# Patient Record
Sex: FEMALE | Race: WHITE | NOT HISPANIC OR LATINO | Employment: FULL TIME | URBAN - METROPOLITAN AREA
[De-identification: names, ages, dates, MRNs, and addresses within clinical notes are randomized per-mention and may not be internally consistent; named-entity substitution may affect disease eponyms.]

---

## 2017-01-06 ENCOUNTER — OFFICE VISIT (OUTPATIENT)
Dept: FAMILY MEDICINE | Facility: CLINIC | Age: 47
End: 2017-01-06
Payer: COMMERCIAL

## 2017-01-06 ENCOUNTER — RADIANT APPOINTMENT (OUTPATIENT)
Dept: GENERAL RADIOLOGY | Facility: CLINIC | Age: 47
End: 2017-01-06
Attending: FAMILY MEDICINE
Payer: COMMERCIAL

## 2017-01-06 VITALS
HEIGHT: 68 IN | DIASTOLIC BLOOD PRESSURE: 76 MMHG | SYSTOLIC BLOOD PRESSURE: 124 MMHG | WEIGHT: 231 LBS | OXYGEN SATURATION: 96 % | TEMPERATURE: 98 F | BODY MASS INDEX: 35.01 KG/M2 | HEART RATE: 85 BPM

## 2017-01-06 DIAGNOSIS — R05.9 COUGH: ICD-10-CM

## 2017-01-06 DIAGNOSIS — R05.9 COUGH: Primary | ICD-10-CM

## 2017-01-06 DIAGNOSIS — R09.89 CHEST CONGESTION: ICD-10-CM

## 2017-01-06 DIAGNOSIS — R09.81 NASAL CONGESTION: ICD-10-CM

## 2017-01-06 DIAGNOSIS — R06.02 SOB (SHORTNESS OF BREATH): ICD-10-CM

## 2017-01-06 PROCEDURE — 71020 XR CHEST 2 VW: CPT

## 2017-01-06 PROCEDURE — 99214 OFFICE O/P EST MOD 30 MIN: CPT | Performed by: FAMILY MEDICINE

## 2017-01-06 RX ORDER — PREDNISONE 20 MG/1
20 TABLET ORAL 2 TIMES DAILY
Qty: 10 TABLET | Refills: 0 | Status: SHIPPED | OUTPATIENT
Start: 2017-01-06 | End: 2017-10-24

## 2017-01-06 NOTE — PATIENT INSTRUCTIONS
Use inhaler, nasal spray  Prednisone and follow up if not resolving or new symptoms arise    Benjamin Phelps D.O.    [facility]   Discharged by : Carley SIMMONS MA    If you have any questions regarding your visit please contact your care team:     [facility] Clinic Hours Telephone Number   Dr. Brook Calderon, [facility]   7am-7pm Monday - Thursday   7am-5pm Fridays  [phone]   (Appointment scheduling available 24/7)   RN Line   [phone] option 2       For a Price Quote for your services, please call our Consumer Price Line at [phone].     What options do I have for visits at the clinic other than the traditional office visit?     To expand how we care for you, many of our providers are utilizing electronic visits (e-visits) and telephone visits, when medically appropriate, for interactions with their patients rather than a visit in the clinic. We also offer nurse visits for many medical concerns. Just like any other service, we will bill your insurance company for this type of visit based on time spent on the phone with your provider. Not all insurance companies cover these visits. Please check with your medical insurance if this type of visit is covered. You will be responsible for any charges that are not paid by your insurance.   E-visits via markeduphart: generally incur a $35.00 fee.     Telephone visits:   Time spent on the phone: *charged based on time that is spent on the phone in increments of 10 minutes. Estimated cost:   5-10 mins $30.00   11-20 mins. $59.00   21-30 mins. $85.00     Use Microtunet (secure email communication and access to your chart) to send your primary care provider a message or make an appointment. Ask someone on your Team how to sign up for Tengah.     As always, Thank you for trusting us with your health care needs!    WHERE TO GO FOR CARE?    Clinic    Make an appointment if you:       Are sick (cold, cough, flu,  sore throat, earache or in pain).       Have a small injury (sprain, small cut, burn or broken bone).       Need a physical exam, Pap smear, vaccine or prescription refill.       Have questions about your health or medicines.    To reach us:      Call [phone] ([phone]). Open 24 hours every day. (For counseling services, call [phone].)    Log into [employer] at [employer]. (Visit [email or web address] to create an account.) Hospital emergency room    An emergency is a serious or life- threatening problem that must be treated right away.    Call 911 or get to the hospital if you have:      Very bad or sudden:            - Chest pain or pressure         - Bleeding         - Head or belly pain         - Dizziness or trouble seeing, walking or                          Speaking      Problems breathing      Blood in your vomit or you are coughing up blood      A major injury (knocked out, loss of a finger or limb, rape, broken bone protruding from skin)    A mental health crisis. (Or call the Mental Health Crisis line at 1-587.585.5013 or Suicide Prevention Hotline at 1-825.870.8077.)    Open 24 hours every day. You don't need an appointment.     Urgent care    Visit urgent care for sickness or small injuries when the clinic is closed. You don't need an appointment. To check hours or find an urgent care near you, visit [email or web address]. Online care    Get online care from [email or web address] for more than 70 common problems, like colds, allergies and infections. Open 24 hours every day at: [email or web address]   Need help deciding?    For advice about where to be seen, you may call your clinic and ask to speak with a nurse. We're here for you 24 hours every day.         If you are deaf or hard of hearing, please let us know. We provide many free services including sign language interpreters, oral interpreters, TTYs, telephone amplifiers, note takers and written materials.

## 2017-01-06 NOTE — NURSING NOTE
"Chief Complaint   Patient presents with     Cold Symptoms       Initial /76 mmHg  Pulse 85  Temp(Src) 98  F (36.7  C) (Oral)  Ht 5' 7.75\" (1.721 m)  Wt 231 lb (104.781 kg)  BMI 35.38 kg/m2  SpO2 96% Estimated body mass index is 35.38 kg/(m^2) as calculated from the following:    Height as of this encounter: 5' 7.75\" (1.721 m).    Weight as of this encounter: 231 lb (104.781 kg).  BP completed using cuff size: cee Johnson MA     "

## 2017-01-06 NOTE — PROGRESS NOTES
SUBJECTIVE:                                                    Nazia Jasso is a 46 year old female who presents to clinic today for the following health issues:    ENT Symptoms             Symptoms: cc Present Absent Comment   Fever/Chills   x    Fatigue  x     Muscle Aches   x    Eye Irritation   x    Sneezing  x     Nasal Dom/Drg   x    Sinus Pressure/Pain  x     Loss of smell   x    Dental pain   x    Sore Throat  x     Swollen Glands   x    Ear Pain/Fullness   x    Cough  x     Wheeze  x     Chest Pain  x  tightness   Shortness of breath   x    Rash   x    Other   x      Symptom duration:  several weeks   Symptom severity:  moderate   Treatments tried:  mucinex, antibiotics   Contacts:  works in a school     11/22/16 treated for bronchitis, zpak , got better , 2 days later felt like symptoms restarted  More cough, no congestion, no fever, some fatigue, few days ago HA, mild nasal drainage  No sob, no tob use, no history of asthma, no real history of recurrent infections few times had has pneumonia  She denies fever      Problem list and histories reviewed & adjusted, as indicated.  Additional history: as documented    Patient Active Problem List   Diagnosis     CARDIOVASCULAR SCREENING; LDL GOAL LESS THAN 160     Mild major depression (H)     Family history of thyroid problem     Encounter for routine gynecological examination     ASCUS of cervix with negative high risk HPV     History reviewed. No pertinent past surgical history.    Social History   Substance Use Topics     Smoking status: Never Smoker      Smokeless tobacco: Never Used     Alcohol Use: Yes      Comment: 1-2 drink per month     Family History   Problem Relation Age of Onset     C.A.D. Paternal Grandfather      CEREBROVASCULAR DISEASE Paternal Grandfather      C.A.D. Maternal Grandfather      DIABETES No family hx of      Breast Cancer No family hx of      Cancer - colorectal No family hx of      Hypertension Mother       "Hypertension Maternal Grandmother      Prostate Cancer Father            ROS:  Constitutional, HEENT, cardiovascular, pulmonary, GI, , musculoskeletal, neuro, skin, endocrine and psych systems are negative, except as otherwise noted.    OBJECTIVE:                                                    /76 mmHg  Pulse 85  Temp(Src) 98  F (36.7  C) (Oral)  Ht 5' 7.75\" (1.721 m)  Wt 231 lb (104.781 kg)  BMI 35.38 kg/m2  SpO2 96%  Body mass index is 35.38 kg/(m^2).  GENERAL: healthy, alert and no distress  EYES: Eyes grossly normal to inspection, PERRL and conjunctivae and sclerae normal  HENT: ear canals and TM's normal, nose and mouth without ulcers or lesions  NECK: no adenopathy, no asymmetry, masses, or scars and thyroid normal to palpation  RESP: lungs clear to auscultation - no rales, rhonchi or wheezes  CV: regular rate and rhythm, normal S1 S2, no S3 or S4, no murmur, click or rub, no peripheral edema and peripheral pulses strong  ABDOMEN: soft, nontender, no hepatosplenomegaly, no masses and bowel sounds normal  MS: no gross musculoskeletal defects noted, no edema    Diagnostic Test Results:  Chest x-ray-negative, read by me     ASSESSMENT/PLAN:                                                        ICD-10-CM    1. Cough R05 XR Chest 2 Views     predniSONE (DELTASONE) 20 MG tablet   2. Nasal congestion R09.81    3. Chest congestion R09.89    4. SOB (shortness of breath) R06.02 predniSONE (DELTASONE) 20 MG tablet       Patient with persisting cough but got better for a while and sounds like another viral illness  shortness of breath/cough-advised inhalers, and prednisone, follow up if worsening  Consider antibiotics if not resolving  Reviewed plan with patient    See Patient Instructions    Benjamin Phelps DO  [facility]    "

## 2017-01-06 NOTE — MR AVS SNAPSHOT
"              After Visit Summary   1/6/2017    Nazia Jasso    MRN: [id number]           Patient Information     Date Of Birth          1970        Visit Information        Provider Department      1/6/2017 1:20 PM Benjamin Phelps DO [facility]        Today's Diagnoses     Cough    -  1     Nasal congestion         Chest congestion         SOB (shortness of breath)           Care Instructions    Use inhaler, nasal spray  Prednisone and follow up if not resolving or new symptoms arise    Benjamin Phelps D.O.          Follow-ups after your visit        Who to contact     If you have questions or need follow up information about today's clinic visit or your schedule please contact [facility] directly at [phone].  Normal or non-critical lab and imaging results will be communicated to you by Mirificehart, letter or phone within 4 business days after the clinic has received the results. If you do not hear from us within 7 days, please contact the clinic through Mirificehart or phone. If you have a critical or abnormal lab result, we will notify you by phone as soon as possible.  Submit refill requests through [employer] or call your pharmacy and they will forward the refill request to us. Please allow 3 business days for your refill to be completed.          Additional Information About Your Visit        MyChart Information     [employer] lets you send messages to your doctor, view your test results, renew your prescriptions, schedule appointments and more. To sign up, go to [email or web address] . Click on \"Log in\" on the left side of the screen, which will take you to the Welcome page. Then click on \"Sign up Now\" on the right side of the page.     You will be asked to enter the access code listed below, as well as some personal information. Please follow the directions to create your username and password.     Your access code is: [email or web address]  Expires: 2/20/2017 " "10:13 AM     Your access code will  in 90 days. If you need help or a new code, please call your [facility] or [phone].        Care EveryWhere ID     This is your Care EveryWhere ID. This could be used by other organizations to access your [facility] medical records  [id number]        Your Vitals Were     Pulse Temperature Height BMI (Body Mass Index) Pulse Oximetry       85 98  F (36.7  C) (Oral) 5' 7.75\" (1.721 m) 35.38 kg/m2 96%        Blood Pressure from Last 3 Encounters:   17 124/76   16 105/62   16 118/60    Weight from Last 3 Encounters:   17 231 lb (104.781 kg)   16 230 lb (104.327 kg)   16 230 lb (104.327 kg)                 Today's Medication Changes          These changes are accurate as of: 17  2:22 PM.  If you have any questions, ask your nurse or doctor.               Start taking these medicines.        Dose/Directions    predniSONE 20 MG tablet   Commonly known as:  DELTASONE   Used for:  Cough, SOB (shortness of breath)   Started by:  Benjamin Phelps DO        Dose:  20 mg   Take 1 tablet (20 mg) by mouth 2 times daily   Quantity:  10 tablet   Refills:  0         Stop taking these medicines if you haven't already. Please contact your care team if you have questions.     triamcinolone 0.1 % cream   Commonly known as:  KENALOG   Stopped by:  Benjamin Phelps DO                Where to get your medicines      These medications were sent to [facility] [id number] [facility] - [place], [place] - [address]  [address] [address]     Phone:  [phone]    - predniSONE 20 MG tablet             Primary Care Provider Office Phone # Fax #    Sarah Gregorio [phone]       [facility] [place] [address] [place] [id number]        Thank you!     Thank you for choosing [facility]  for your care. Our goal is always to provide you with excellent care. " Hearing back from our patients is one way we can continue to improve our services. Please take a few minutes to complete the written survey that you may receive in the mail after your visit with us. Thank you!             Your Updated Medication List - Protect others around you: Learn how to safely use, store and throw away your medicines at www.disposemymeds.org.          This list is accurate as of: 1/6/17  2:22 PM.  Always use your most recent med list.                   Brand Name Dispense Instructions for use    citalopram 20 MG tablet    celeXA    90 tablet    Take 1 tablet (20 mg) by mouth daily       ketoconazole 2 % cream    NIZORAL    30 g    Place a small amount in ear canal twice daily for 4 weeks.       predniSONE 20 MG tablet    DELTASONE    10 tablet    Take 1 tablet (20 mg) by mouth 2 times daily

## 2017-06-27 ENCOUNTER — RADIANT APPOINTMENT (OUTPATIENT)
Dept: MAMMOGRAPHY | Facility: CLINIC | Age: 47
End: 2017-06-27
Attending: FAMILY MEDICINE
Payer: COMMERCIAL

## 2017-06-27 DIAGNOSIS — Z12.31 VISIT FOR SCREENING MAMMOGRAM: ICD-10-CM

## 2017-06-27 PROCEDURE — G0202 SCR MAMMO BI INCL CAD: HCPCS | Mod: TC

## 2017-09-06 DIAGNOSIS — F33.0 MAJOR DEPRESSIVE DISORDER, RECURRENT EPISODE, MILD (H): ICD-10-CM

## 2017-09-06 NOTE — TELEPHONE ENCOUNTER
citalopram (CELEXA) 20 MG tablet   20 mg, DAILY 3 ordered  Edit     Summary: Take 1 tablet (20 mg) by mouth daily, Disp-90 tablet, R-3, E-Prescribe   Dose, Route, Frequency: 20 mg, Oral, DAILY  Start: 6/17/2016  Ord/Sold: 6/17/2016 (O)  Report  Taking:   Long-term:   Pharmacy: [place] [id number] [place] [address] Dose History       Patient Sig: Take 1 tablet (20 mg) by mouth daily       Ordered on: 6/17/2016       Authorized by: GUZMAN DAWSON       Dispense: 90 tablet          Last Office Visit with [facility] primary care provider:  1-6-2017        Last PHQ-9 score on record=   PHQ-9 SCORE 11/22/2016   Total Score -   Total Score 0

## 2017-09-07 RX ORDER — CITALOPRAM HYDROBROMIDE 20 MG/1
20 TABLET ORAL DAILY
Qty: 30 TABLET | Refills: 0 | Status: SHIPPED | OUTPATIENT
Start: 2017-09-07 | End: 2017-10-24

## 2017-10-24 ENCOUNTER — OFFICE VISIT (OUTPATIENT)
Dept: FAMILY MEDICINE | Facility: CLINIC | Age: 47
End: 2017-10-24
Payer: COMMERCIAL

## 2017-10-24 VITALS
HEIGHT: 68 IN | WEIGHT: 234 LBS | TEMPERATURE: 98.1 F | SYSTOLIC BLOOD PRESSURE: 122 MMHG | DIASTOLIC BLOOD PRESSURE: 75 MMHG | BODY MASS INDEX: 35.46 KG/M2 | HEART RATE: 76 BPM

## 2017-10-24 DIAGNOSIS — Z23 NEED FOR PROPHYLACTIC VACCINATION AND INOCULATION AGAINST INFLUENZA: ICD-10-CM

## 2017-10-24 DIAGNOSIS — Z00.00 ROUTINE GENERAL MEDICAL EXAMINATION AT A HEALTH CARE FACILITY: Primary | ICD-10-CM

## 2017-10-24 DIAGNOSIS — F33.0 MAJOR DEPRESSIVE DISORDER, RECURRENT EPISODE, MILD (H): ICD-10-CM

## 2017-10-24 PROCEDURE — 99396 PREV VISIT EST AGE 40-64: CPT | Mod: 25 | Performed by: FAMILY MEDICINE

## 2017-10-24 PROCEDURE — 90686 IIV4 VACC NO PRSV 0.5 ML IM: CPT | Performed by: FAMILY MEDICINE

## 2017-10-24 PROCEDURE — 90471 IMMUNIZATION ADMIN: CPT | Performed by: FAMILY MEDICINE

## 2017-10-24 RX ORDER — CITALOPRAM HYDROBROMIDE 20 MG/1
20 TABLET ORAL DAILY
Qty: 90 TABLET | Refills: 3 | Status: SHIPPED | OUTPATIENT
Start: 2017-10-24 | End: 2018-11-01

## 2017-10-24 ASSESSMENT — PATIENT HEALTH QUESTIONNAIRE - PHQ9: SUM OF ALL RESPONSES TO PHQ QUESTIONS 1-9: 1

## 2017-10-24 NOTE — NURSING NOTE
"Chief Complaint   Patient presents with     Physical       Initial /75  Pulse 76  Temp 98.1  F (36.7  C) (Oral)  Ht 5' 8\" (1.727 m)  Wt 234 lb (106.1 kg)  LMP 09/25/2017 (Approximate)  Breastfeeding? No  BMI 35.58 kg/m2 Estimated body mass index is 35.58 kg/(m^2) as calculated from the following:    Height as of this encounter: 5' 8\" (1.727 m).    Weight as of this encounter: 234 lb (106.1 kg).  Medication Reconciliation: complete    "

## 2017-10-24 NOTE — PROGRESS NOTES
SUBJECTIVE:   CC: Nazia Jasso is an 47 year old woman who presents for preventive health visit.     Physical   Annual:     Getting at least 3 servings of Calcium per day::  NO    Bi-annual eye exam::  NO    Dental care twice a year::  Yes    Sleep apnea or symptoms of sleep apnea::  Excessive snoring    Diet::  Regular (no restrictions)    Frequency of exercise::  1 day/week    Duration of exercise::  15-30 minutes    Taking medications regularly::  Yes    Medication side effects::  None    Additional concerns today::  No    Weight Management:  She states she has gained weight recently. She states she has not been exercising very much if at all lately. She states she sometimes walks outside, but not often. She works just about every day and has two young kids. She states they eat out a lot and she knows that this isn't good for her. She says she just has a very busy lifestyle.     Cold Symptoms:  She is concerned about her immune system because she feels as if she gets sick a lot. She states she is a  so she is around a lot of germs, but she feels like when she gets these cold she cannot tough it out and go to work. She says that she has to stay home in bed when this happens. She states she gets this cold about 2 times per year. She says when the cold begins she tries to take vitamins to try to avoid getting it. She states that the cold usually starts in her sinuses and then drains and lingers in her chest with a cough. Usually her colds last about 1-2 weeks. She states she has never been diagnosed with asthma.         Depression Followup    Status since last visit: Stable     See PHQ-9 for current symptoms.  Other associated symptoms: None    Complicating factors:   Significant life event:  No   Current substance abuse:  None  Anxiety or Panic symptoms:  No  She states that her mood is good and very stable. She is happy with her Celexa.   PHQ-9 Score and MyChart F/U Questions  2/22/2016 11/22/2016   Total Score 4 0   Q9: Suicide Ideation Not at all Not at all       PHQ-9  English  PHQ-9   Any Language  Suicide Assessment Five-step Evaluation and Treatment (SAFE-T)        Today's PHQ-2 Score: PHQ-2 ( 1999 Pfizer) 10/24/2017   Q1: Little interest or pleasure in doing things 0   Q2: Feeling down, depressed or hopeless 0   PHQ-2 Score 0   Q1: Little interest or pleasure in doing things Not at all   Q2: Feeling down, depressed or hopeless Not at all   PHQ-2 Score 0       Abuse: Current or Past(Physical, Sexual or Emotional)- No  Do you feel safe in your environment - Yes    Social History   Substance Use Topics     Smoking status: Never Smoker     Smokeless tobacco: Never Used     Alcohol use Yes      Comment: 1-2 drink per month     The patient does not drink >3 drinks per day nor >7 drinks per week.    Reviewed orders with patient.  Reviewed health maintenance and updated orders accordingly - Yes  Patient Active Problem List   Diagnosis     CARDIOVASCULAR SCREENING; LDL GOAL LESS THAN 160     Mild major depression (H)     Family history of thyroid problem     Encounter for routine gynecological examination     ASCUS of cervix with negative high risk HPV     History reviewed. No pertinent surgical history.    Social History   Substance Use Topics     Smoking status: Never Smoker     Smokeless tobacco: Never Used     Alcohol use Yes      Comment: 1-2 drink per month     Family History   Problem Relation Age of Onset     C.A.D. Paternal Grandfather      CEREBROVASCULAR DISEASE Paternal Grandfather      C.A.D. Maternal Grandfather      Hypertension Mother      Hypertension Maternal Grandmother      Prostate Cancer Father      DIABETES No family hx of      Breast Cancer No family hx of      Cancer - colorectal No family hx of          Current Outpatient Prescriptions   Medication Sig Dispense Refill     citalopram (CELEXA) 20 MG tablet Take 1 tablet (20 mg) by mouth daily Due for a visit for  "refills! 30 tablet 0     Allergies   Allergen Reactions     Septra Ds [Sulfamethoxazole W-Trimethoprim] Hives     Happened at end of course from 12/01/2011-12/     Erythromycin      hives     Penicillins      hives         Patient under age 50, mutual decision reflected in health maintenance.        Pertinent mammograms are reviewed under the imaging tab.  History of abnormal Pap smear: NO - age 30- 65 PAP every 3 years recommended    Reviewed and updated as needed this visit by clinical staffTobacco  Allergies  Med Hx  Surg Hx  Fam Hx  Soc Hx        Reviewed and updated as needed this visit by Provider  Allergies  Meds  Problems            Review of Systems  C: NEGATIVE for fever, chills, change in weight  I: NEGATIVE for worrisome rashes, moles or lesions  E: NEGATIVE for vision changes or irritation  ENT: NEGATIVE for ear, mouth and throat problems  R: NEGATIVE for significant cough or SOB  B: NEGATIVE for masses, tenderness or discharge  CV: NEGATIVE for chest pain, palpitations or peripheral edema  GI: NEGATIVE for nausea, abdominal pain, heartburn, or change in bowel habits  : NEGATIVE for unusual urinary or vaginal symptoms. Periods are regular.  M: NEGATIVE for significant arthralgias or myalgia  N: NEGATIVE for weakness, dizziness or paresthesias  P: NEGATIVE for changes in mood or affect     This document serves as a record of the services and decisions personally performed by GUZMAN DAWSON. It was created on his/her behalf by Aliya Jones, a trained medical scribe. The creation of this document is based on the provider's statements to the medical scribe. Aliya Jones, October 24, 2017 11:37 AM    OBJECTIVE:   /75  Pulse 76  Temp 98.1  F (36.7  C) (Oral)  Ht 5' 8\" (1.727 m)  Wt 234 lb (106.1 kg)  LMP 09/25/2017 (Approximate)  Breastfeeding? No  BMI 35.58 kg/m2  Physical Exam  GENERAL: healthy, alert and no distress  EYES: Eyes grossly normal to inspection, PERRL and " "conjunctivae and sclerae normal  HENT: ear canals and TM's normal, nose and mouth without ulcers or lesions  NECK: no adenopathy, no asymmetry, masses, or scars and thyroid normal to palpation  RESP: lungs clear to auscultation - no rales, rhonchi or wheezes  BREAST: normal without masses, tenderness or nipple discharge and no palpable axillary masses or adenopathy  CV: regular rate and rhythm, normal S1 S2, no S3 or S4, no murmur, click or rub, no peripheral edema and peripheral pulses strong  ABDOMEN: soft, nontender, no hepatosplenomegaly, no masses and bowel sounds normal  MS: no gross musculoskeletal defects noted, no edema  SKIN: no suspicious lesions or rashes  NEURO: Normal strength and tone, mentation intact and speech normal  PSYCH: mentation appears normal, affect normal/bright    ASSESSMENT/PLAN:   1. Routine general medical examination at a health care facility  Healthy.   Has reportedly been diagnosed with the MTHFR mutation.  (She was investigated because her son has the mutation).  She did meet with genetics and they did not advise any further intervention.    2. Major depressive disorder, recurrent episode, mild (H)  Patient states her mood is very stable and well controlled on Celexa. Continue current medications.  - citalopram (CELEXA) 20 MG tablet; Take 1 tablet (20 mg) by mouth daily  Dispense: 90 tablet; Refill: 3    COUNSELING:  Reviewed preventive health counseling, as reflected in patient instructions     reports that she has never smoked. She has never used smokeless tobacco.    Estimated body mass index is 35.58 kg/(m^2) as calculated from the following:    Height as of this encounter: 5' 8\" (1.727 m).    Weight as of this encounter: 234 lb (106.1 kg).   Weight management plan: Discussed healthy diet and exercise guidelines and patient will follow up in 12 months in clinic to re-evaluate.    Counseling Resources:  ATP IV Guidelines  Pooled Cohorts Equation Calculator  Breast Cancer Risk " Calculator  FRAX Risk Assessment  ICSI Preventive Guidelines  Dietary Guidelines for Americans, 2010  USDA's MyPlate  ASA Prophylaxis  Lung CA Screening    Sarah Gregorio MD  [facility]  Answers for HPI/ROS submitted by the patient on 10/24/2017   PHQ-2 Score: 0    The information in this document, created by the medical scribe Aliay Jones for me, accurately reflects the services I personally performed and the decisions made by me. I have reviewed and approved this document for accuracy prior to leaving the patient care area.

## 2017-10-24 NOTE — PATIENT INSTRUCTIONS
We will plan to recheck cholesterol and pap in 2019.  But let's touch base about your mood yearly.    Please send me the information you have on the MTHFR mutation and your diagnosis.    Preventive Health Recommendations  Female Ages 40 to 49    Yearly exam:     See your health care provider every year in order to  1. Review health changes.   2. Discuss preventive care.    3. Review your medicines if your doctor prescribed any.      Get a Pap test every three years (unless you have an abnormal result and your provider advises testing more often).      If you get Pap tests with HPV test, you only need to test every 5 years, unless you have an abnormal result. You do not need a Pap test if your uterus was removed (hysterectomy) and you have not had cancer.      You should be tested each year for STDs (sexually transmitted diseases), if you're at risk.       Ask your doctor if you should have a mammogram.      Have a colonoscopy (test for colon cancer) if someone in your family has had colon cancer or polyps before age 50.       Have a cholesterol test every 5 years.       Have a diabetes test (fasting glucose) after age 45. If you are at risk for diabetes, you should have this test every 3 years.    Shots: Get a flu shot each year. Get a tetanus shot every 10 years.     Nutrition:     Eat at least 5 servings of fruits and vegetables each day.    Eat whole-grain bread, whole-wheat pasta and brown rice instead of white grains and rice.    Talk to your provider about Calcium and Vitamin D.     Lifestyle    Exercise at least 150 minutes a week (an average of 30 minutes a day, 5 days a week). This will help you control your weight and prevent disease.    Limit alcohol to one drink per day.    No smoking.     Wear sunscreen to prevent skin cancer.    See your dentist every six months for an exam and cleaning.    [facility]   Discharged by : Carley SIMMONS MA    If you have any questions regarding your visit  please contact your care team:     [facility] Clinic Hours Telephone Number   Dr. Brook Suh   7am-7pm Monday - Thursday   7am-5pm Fridays  [phone]   (Appointment scheduling available 24/7)   RN Line   [phone] option 2       For a Price Quote for your services, please call our Consumer Price Line at [phone].     What options do I have for visits at the clinic other than the traditional office visit?     To expand how we care for you, many of our providers are utilizing electronic visits (e-visits) and telephone visits, when medically appropriate, for interactions with their patients rather than a visit in the clinic. We also offer nurse visits for many medical concerns. Just like any other service, we will bill your insurance company for this type of visit based on time spent on the phone with your provider. Not all insurance companies cover these visits. Please check with your medical insurance if this type of visit is covered. You will be responsible for any charges that are not paid by your insurance.   E-visits via The Matlet Grouphart: generally incur a $35.00 fee.     Telephone visits:   Time spent on the phone: *charged based on time that is spent on the phone in increments of 10 minutes. Estimated cost:   5-10 mins $30.00   11-20 mins. $59.00   21-30 mins. $85.00     Use The Matlet Grouphart (secure email communication and access to your chart) to send your primary care provider a message or make an appointment. Ask someone on your Team how to sign up for Teamlyt.     As always, Thank you for trusting us with your health care needs!      [facility]:    Scheduling Appointments  [facility] [place] [place]  Call: [phone]    [facility] [place]  Call: [phone]    [facility]  Call: [phone]    For Gastroenterology referrals   [facility]   Clinics and  Surgery Center, 4th Floor   [address] [place] [address]   Appointments: [phone]    WHERE TO GO FOR CARE?  Clinic    Make an appointment if you:       Are sick (cold, cough, flu, sore throat, earache or in pain).       Have a small injury (sprain, small cut, burn or broken bone).       Need a physical exam, Pap smear, vaccine or prescription refill.       Have questions about your health or medicines.    To reach us:      Call [phone] ([phone]). Open 24 hours every day. (For counseling services, call [phone].)    Log into Artaic at Rising. (Visit Panna to create an account.) Hospital emergency room    An emergency is a serious or life- threatening problem that must be treated right away.    Call 890 or get to the hospital if you have:      Very bad or sudden:            - Chest pain or pressure         - Bleeding         - Head or belly pain         - Dizziness or trouble seeing, walking or                          Speaking      Problems breathing      Blood in your vomit or you are coughing up blood      A major injury (knocked out, loss of a finger or limb, rape, broken bone protruding from skin)    A mental health crisis. (Or call the Mental Health Crisis line at 1-191.342.7207 or Suicide Prevention Hotline at 1-859.245.4683.)    Open 24 hours every day. You don't need an appointment.     Urgent care    Visit urgent care for sickness or small injuries when the clinic is closed. You don't need an appointment. To check hours or find an urgent care near you, visit [email or web address]. Online care    Get online care from [facility] for more than 70 common problems, like colds, allergies and infections. Open 24 hours every day at:   [email or web address]   Need help deciding?    For advice about where to be seen, you may call your clinic and ask to speak with a nurse. We're here for you 24 hours every day.         If you are deaf or hard of hearing, please let us  know. We provide many free services including sign language interpreters, oral interpreters, TTYs, telephone amplifiers, note takers and written materials.

## 2017-10-24 NOTE — MR AVS SNAPSHOT
After Visit Summary   10/24/2017    Nazia Jasso    MRN: [id number]           Patient Information     Date Of Birth          1970        Visit Information        Provider Department      10/24/2017 11:20 AM Sarah Gregorio MD [facility]        Today's Diagnoses     Routine general medical examination at a health care facility    -  1    Major depressive disorder, recurrent episode, mild (H)          Care Instructions    We will plan to recheck cholesterol and pap in 2019.  But let's touch base about your mood yearly.    Please send me the information you have on the MTHFR mutation and your diagnosis.    Preventive Health Recommendations  Female Ages 40 to 49    Yearly exam:     See your health care provider every year in order to  1. Review health changes.   2. Discuss preventive care.    3. Review your medicines if your doctor prescribed any.      Get a Pap test every three years (unless you have an abnormal result and your provider advises testing more often).      If you get Pap tests with HPV test, you only need to test every 5 years, unless you have an abnormal result. You do not need a Pap test if your uterus was removed (hysterectomy) and you have not had cancer.      You should be tested each year for STDs (sexually transmitted diseases), if you're at risk.       Ask your doctor if you should have a mammogram.      Have a colonoscopy (test for colon cancer) if someone in your family has had colon cancer or polyps before age 50.       Have a cholesterol test every 5 years.       Have a diabetes test (fasting glucose) after age 45. If you are at risk for diabetes, you should have this test every 3 years.    Shots: Get a flu shot each year. Get a tetanus shot every 10 years.     Nutrition:     Eat at least 5 servings of fruits and vegetables each day.    Eat whole-grain bread, whole-wheat pasta and brown rice instead of white grains and rice.    Talk  "to your provider about Calcium and Vitamin D.     Lifestyle    Exercise at least 150 minutes a week (an average of 30 minutes a day, 5 days a week). This will help you control your weight and prevent disease.    Limit alcohol to one drink per day.    No smoking.     Wear sunscreen to prevent skin cancer.    See your dentist every six months for an exam and cleaning.          Follow-ups after your visit        Who to contact     If you have questions or need follow up information about today's clinic visit or your schedule please contact [facility] directly at [phone].  Normal or non-critical lab and imaging results will be communicated to you by Axentrahart, letter or phone within 4 business days after the clinic has received the results. If you do not hear from us within 7 days, please contact the clinic through Axentrahart or phone. If you have a critical or abnormal lab result, we will notify you by phone as soon as possible.  Submit refill requests through GetJar or call your pharmacy and they will forward the refill request to us. Please allow 3 business days for your refill to be completed.          Additional Information About Your Visit        MyChart Information     GetJar lets you send messages to your doctor, view your test results, renew your prescriptions, schedule appointments and more. To sign up, go to [email or web address] . Click on \"Log in\" on the left side of the screen, which will take you to the Welcome page. Then click on \"Sign up Now\" on the right side of the page.     You will be asked to enter the access code listed below, as well as some personal information. Please follow the directions to create your username and password.     Your access code is: [email or web address]  Expires: 2018 11:57 AM     Your access code will  in 90 days. If you need help or a new code, please call your [facility] or [phone].        Care EveryWhere ID     This is your Care " "EveryWhere ID. This could be used by other organizations to access your [place] medical records  [id number]        Your Vitals Were     Pulse Temperature Height Last Period Breastfeeding? BMI (Body Mass Index)    76 98.1  F (36.7  C) (Oral) 5' 8\" (1.727 m) 09/25/2017 (Approximate) No 35.58 kg/m2       Blood Pressure from Last 3 Encounters:   10/24/17 122/75   01/06/17 124/76   11/22/16 105/62    Weight from Last 3 Encounters:   10/24/17 234 lb (106.1 kg)   01/06/17 231 lb (104.8 kg)   11/30/16 230 lb (104.3 kg)              Today, you had the following     No orders found for display         Today's Medication Changes          These changes are accurate as of: 10/24/17 11:57 AM.  If you have any questions, ask your nurse or doctor.               These medicines have changed or have updated prescriptions.        Dose/Directions    citalopram 20 MG tablet   Commonly known as:  celeXA   This may have changed:  additional instructions   Used for:  Major depressive disorder, recurrent episode, mild (H)   Changed by:  Sarah Gregorio MD        Dose:  20 mg   Take 1 tablet (20 mg) by mouth daily   Quantity:  90 tablet   Refills:  3         Stop taking these medicines if you haven't already. Please contact your care team if you have questions.     ketoconazole 2 % cream   Commonly known as:  NIZORAL   Stopped by:  Sarah Gregorio MD           predniSONE 20 MG tablet   Commonly known as:  DELTASONE   Stopped by:  Sarah Gregorio MD                Where to get your medicines      These medications were sent to [facility] [id number] [facility] - [place], [place] - [address]  [address] [address]     Phone:  [phone]     citalopram 20 MG tablet                Primary Care Provider Office Phone # Fax #    Sarah Gregorio [phone]       [facility] [address]        Equal Access to Services     GLEN MORAN AH: Hadii malia " jerardo Ivory, wakanwalda lumaximilianadaha, qamalaikata kaewa ray, vashti ruiin hayaamic estradamadeline minholly laheribertomic eriberto. So [facility] [phone].    ATENCIÓN: Si habla español, tiene a slater disposición servicios gratuitos de asistencia lingüística. Kathyame al [phone].    We comply with applicable federal civil rights laws and [place] laws. We do not discriminate on the basis of race, color, national origin, age, disability, sex, sexual orientation, or gender identity.            Thank you!     Thank you for choosing [facility]  for your care. Our goal is always to provide you with excellent care. Hearing back from our patients is one way we can continue to improve our services. Please take a few minutes to complete the written survey that you may receive in the mail after your visit with us. Thank you!             Your Updated Medication List - Protect others around you: Learn how to safely use, store and throw away your medicines at www.disposemymeds.org.          This list is accurate as of: 10/24/17 11:57 AM.  Always use your most recent med list.                   Brand Name Dispense Instructions for use Diagnosis    citalopram 20 MG tablet    celeXA    90 tablet    Take 1 tablet (20 mg) by mouth daily    Major depressive disorder, recurrent episode, mild (H)

## 2018-07-02 ENCOUNTER — RADIANT APPOINTMENT (OUTPATIENT)
Dept: MAMMOGRAPHY | Facility: CLINIC | Age: 48
End: 2018-07-02
Attending: FAMILY MEDICINE
Payer: COMMERCIAL

## 2018-07-02 DIAGNOSIS — Z12.31 VISIT FOR SCREENING MAMMOGRAM: ICD-10-CM

## 2018-07-02 PROCEDURE — 77067 SCR MAMMO BI INCL CAD: CPT | Mod: TC

## 2018-07-02 PROCEDURE — 77063 BREAST TOMOSYNTHESIS BI: CPT | Mod: TC

## 2018-11-01 ENCOUNTER — TELEPHONE (OUTPATIENT)
Dept: FAMILY MEDICINE | Facility: CLINIC | Age: 48
End: 2018-11-01

## 2018-11-01 DIAGNOSIS — F33.0 MAJOR DEPRESSIVE DISORDER, RECURRENT EPISODE, MILD (H): ICD-10-CM

## 2018-11-01 RX ORDER — CITALOPRAM HYDROBROMIDE 20 MG/1
TABLET ORAL
Qty: 30 TABLET | Refills: 0 | Status: SHIPPED | OUTPATIENT
Start: 2018-11-01 | End: 2018-12-11

## 2018-11-01 NOTE — TELEPHONE ENCOUNTER
"Requested Prescriptions   Pending Prescriptions Disp Refills     citalopram (CELEXA) 20 MG tablet [Pharmacy Med Name: CITALOPRAM HBR 20 MG TABLET]  Last Written Prescription Date:  10/24/2017  Last Fill Quantity: 90 tablet,  # refills: 3   Last office visit: 10/24/2017 with prescribing provider:  RENATA Gregorio   Future Office Visit:     90 tablet 3     Sig: TAKE 1 TABLET (20 MG) BY MOUTH DAILY    SSRIs Protocol Failed    11/1/2018  2:04 AM       Failed - PHQ-9 score less than 5 in past 6 months    Please review last PHQ-9 score.     PHQ-9 SCORE 2/22/2016 11/22/2016 10/24/2017   Total Score - - -   Total Score 4 0 1     ANDRES-7 SCORE:  No flowsheet data found.         Failed - Recent (6 mo) or future (30 days) visit within the authorizing provider's specialty    Patient had office visit in the last 6 months or has a visit in the next 30 days with authorizing provider or within the authorizing provider's specialty.  See \"Patient Info\" tab in inbasket, or \"Choose Columns\" in Meds & Orders section of the refill encounter.           Passed - Patient is age 18 or older       Passed - No active pregnancy on record       Passed - No positive pregnancy test in last 12 months          "

## 2018-11-01 NOTE — TELEPHONE ENCOUNTER
Elli given x1 today. Patient needs office visit, please schedule. Patient is due for annual visit.     Thanks!  Micah Otero RN

## 2018-12-11 DIAGNOSIS — F33.0 MAJOR DEPRESSIVE DISORDER, RECURRENT EPISODE, MILD (H): ICD-10-CM

## 2018-12-11 NOTE — TELEPHONE ENCOUNTER
"Requested Prescriptions   Pending Prescriptions Disp Refills     citalopram (CELEXA) 20 MG tablet [Pharmacy Med Name: CITALOPRAM HBR 20 MG TABLET]  Last Written Prescription Date:  11/1/2018  Last Fill Quantity: 30 tablet,  # refills: 0   Last office visit: 10/24/2017 with prescribing provider:  RENATA Gregorio   Future Office Visit:   Next 5 appointments (look out 90 days)    Dec 27, 2018 10:00 AM CST  PHYSICAL with Natalie Garcia NP  [facility] ([address] [id number] [phone]          30 tablet 0     Sig: TAKE 1 TABLET BY MOUTH EVERY DAY. NEEDS OFFICE VISIT FOR FURTHER REFILLS.    SSRIs Protocol Failed - 12/11/2018  1:59 AM       Failed - PHQ-9 score less than 5 in past 6 months    Please review last PHQ-9 score.     PHQ-9 SCORE 2/22/2016 11/22/2016 10/24/2017   PHQ-9 Total Score - - -   PHQ-9 Total Score 4 0 1     ANDRES-7 SCORE:  No flowsheet data found.         Passed - Patient is age 18 or older       Passed - No active pregnancy on record       Passed - No positive pregnancy test in last 12 months       Passed - Recent (6 mo) or future (30 days) visit within the authorizing provider's specialty    Patient had office visit in the last 6 months or has a visit in the next 30 days with authorizing provider or within the authorizing provider's specialty.  See \"Patient Info\" tab in inbasket, or \"Choose Columns\" in Meds & Orders section of the refill encounter.              "

## 2018-12-12 RX ORDER — CITALOPRAM HYDROBROMIDE 20 MG/1
TABLET ORAL
Qty: 30 TABLET | Refills: 0 | Status: SHIPPED | OUTPATIENT
Start: 2018-12-12 | End: 2019-01-10

## 2018-12-12 NOTE — TELEPHONE ENCOUNTER
Routing refill request to provider for review/approval because:  Failed protocol: See below.     Le Adam RN

## 2019-01-10 DIAGNOSIS — F33.0 MAJOR DEPRESSIVE DISORDER, RECURRENT EPISODE, MILD (H): ICD-10-CM

## 2019-01-10 RX ORDER — CITALOPRAM HYDROBROMIDE 20 MG/1
TABLET ORAL
Qty: 30 TABLET | Refills: 0 | Status: SHIPPED | OUTPATIENT
Start: 2019-01-10 | End: 2019-01-11

## 2019-01-10 NOTE — TELEPHONE ENCOUNTER
"Requested Prescriptions   Pending Prescriptions Disp Refills     citalopram (CELEXA) 20 MG tablet  Last Written Prescription Date:  12/12/2018  Last Fill Quantity: 30 tabs,  # refills: 0   Last office visit: 10/24/2017 with prescribing provider:  Darline   Future Office Visit:   Next 5 appointments (look out 90 days)    Jan 11, 2019  2:20 PM CST  [facility] [address] [place] [id number] [phone]          30 tablet 0    SSRIs Protocol Failed - 1/10/2019  3:54 PM   PHQ-9 SCORE 2/22/2016 11/22/2016 10/24/2017   PHQ-9 Total Score - - -   PHQ-9 Total Score 4 0 1     No flowsheet data found.        Failed - PHQ-9 score less than 5 in past 6 months    Please review last PHQ-9 score.          Passed - Medication is active on med list       Passed - Patient is age 18 or older       Passed - No active pregnancy on record       Passed - No positive pregnancy test in last 12 months       Passed - Recent (6 mo) or future (30 days) visit within the authorizing provider's specialty    Patient had office visit in the last 6 months or has a visit in the next 30 days with authorizing provider or within the authorizing provider's specialty.  See \"Patient Info\" tab in inbasket, or \"Choose Columns\" in Meds & Orders section of the refill encounter.              "

## 2019-01-11 ENCOUNTER — OFFICE VISIT (OUTPATIENT)
Dept: FAMILY MEDICINE | Facility: CLINIC | Age: 49
End: 2019-01-11
Payer: COMMERCIAL

## 2019-01-11 VITALS
DIASTOLIC BLOOD PRESSURE: 70 MMHG | HEIGHT: 68 IN | SYSTOLIC BLOOD PRESSURE: 122 MMHG | HEART RATE: 72 BPM | BODY MASS INDEX: 36.07 KG/M2 | TEMPERATURE: 98.1 F | WEIGHT: 238 LBS

## 2019-01-11 DIAGNOSIS — Z00.00 ROUTINE HISTORY AND PHYSICAL EXAMINATION OF ADULT: Primary | ICD-10-CM

## 2019-01-11 DIAGNOSIS — Z23 NEED FOR PROPHYLACTIC VACCINATION AND INOCULATION AGAINST INFLUENZA: ICD-10-CM

## 2019-01-11 DIAGNOSIS — F33.0 MAJOR DEPRESSIVE DISORDER, RECURRENT EPISODE, MILD (H): ICD-10-CM

## 2019-01-11 DIAGNOSIS — R06.83 SNORING: ICD-10-CM

## 2019-01-11 DIAGNOSIS — E66.812 CLASS 2 OBESITY DUE TO EXCESS CALORIES WITHOUT SERIOUS COMORBIDITY WITH BODY MASS INDEX (BMI) OF 36.0 TO 36.9 IN ADULT: ICD-10-CM

## 2019-01-11 DIAGNOSIS — R53.83 FATIGUE, UNSPECIFIED TYPE: ICD-10-CM

## 2019-01-11 DIAGNOSIS — Z13.220 SCREENING FOR HYPERLIPIDEMIA: ICD-10-CM

## 2019-01-11 DIAGNOSIS — Z83.49 FAMILY HISTORY OF THYROID DISEASE: ICD-10-CM

## 2019-01-11 DIAGNOSIS — E55.9 VITAMIN D DEFICIENCY: ICD-10-CM

## 2019-01-11 DIAGNOSIS — E66.09 CLASS 2 OBESITY DUE TO EXCESS CALORIES WITHOUT SERIOUS COMORBIDITY WITH BODY MASS INDEX (BMI) OF 36.0 TO 36.9 IN ADULT: ICD-10-CM

## 2019-01-11 DIAGNOSIS — R52 GENERALIZED BODY ACHES: ICD-10-CM

## 2019-01-11 DIAGNOSIS — Z13.29 SCREENING FOR THYROID DISORDER: ICD-10-CM

## 2019-01-11 LAB
BASOPHILS # BLD AUTO: 0 10E9/L (ref 0–0.2)
BASOPHILS NFR BLD AUTO: 0.4 %
DIFFERENTIAL METHOD BLD: NORMAL
EOSINOPHIL # BLD AUTO: 0.2 10E9/L (ref 0–0.7)
EOSINOPHIL NFR BLD AUTO: 2.2 %
ERYTHROCYTE [DISTWIDTH] IN BLOOD BY AUTOMATED COUNT: 13.4 % (ref 10–15)
HCT VFR BLD AUTO: 41.5 % (ref 35–47)
HGB BLD-MCNC: 13.5 G/DL (ref 11.7–15.7)
LYMPHOCYTES # BLD AUTO: 1.6 10E9/L (ref 0.8–5.3)
LYMPHOCYTES NFR BLD AUTO: 19.4 %
MCH RBC QN AUTO: 29.1 PG (ref 26.5–33)
MCHC RBC AUTO-ENTMCNC: 32.5 G/DL (ref 31.5–36.5)
MCV RBC AUTO: 89 FL (ref 78–100)
MONOCYTES # BLD AUTO: 0.5 10E9/L (ref 0–1.3)
MONOCYTES NFR BLD AUTO: 6 %
NEUTROPHILS # BLD AUTO: 6.1 10E9/L (ref 1.6–8.3)
NEUTROPHILS NFR BLD AUTO: 72 %
PLATELET # BLD AUTO: 286 10E9/L (ref 150–450)
RBC # BLD AUTO: 4.64 10E12/L (ref 3.8–5.2)
T3 SERPL-MCNC: 112 NG/DL (ref 60–181)
WBC # BLD AUTO: 8.5 10E9/L (ref 4–11)

## 2019-01-11 PROCEDURE — 85025 COMPLETE CBC W/AUTO DIFF WBC: CPT | Performed by: NURSE PRACTITIONER

## 2019-01-11 PROCEDURE — 90686 IIV4 VACC NO PRSV 0.5 ML IM: CPT | Performed by: NURSE PRACTITIONER

## 2019-01-11 PROCEDURE — 90471 IMMUNIZATION ADMIN: CPT | Performed by: NURSE PRACTITIONER

## 2019-01-11 PROCEDURE — 84439 ASSAY OF FREE THYROXINE: CPT | Performed by: NURSE PRACTITIONER

## 2019-01-11 PROCEDURE — 36415 COLL VENOUS BLD VENIPUNCTURE: CPT | Performed by: NURSE PRACTITIONER

## 2019-01-11 PROCEDURE — 84443 ASSAY THYROID STIM HORMONE: CPT | Performed by: NURSE PRACTITIONER

## 2019-01-11 PROCEDURE — 80061 LIPID PANEL: CPT | Performed by: NURSE PRACTITIONER

## 2019-01-11 PROCEDURE — 84480 ASSAY TRIIODOTHYRONINE (T3): CPT | Performed by: NURSE PRACTITIONER

## 2019-01-11 PROCEDURE — 99396 PREV VISIT EST AGE 40-64: CPT | Mod: 25 | Performed by: NURSE PRACTITIONER

## 2019-01-11 PROCEDURE — 82306 VITAMIN D 25 HYDROXY: CPT | Performed by: NURSE PRACTITIONER

## 2019-01-11 PROCEDURE — 99214 OFFICE O/P EST MOD 30 MIN: CPT | Mod: 25 | Performed by: NURSE PRACTITIONER

## 2019-01-11 RX ORDER — CITALOPRAM HYDROBROMIDE 20 MG/1
TABLET ORAL
Qty: 90 TABLET | Refills: 1 | Status: SHIPPED | OUTPATIENT
Start: 2019-01-11 | End: 2019-08-29

## 2019-01-11 ASSESSMENT — ENCOUNTER SYMPTOMS
HEMATURIA: 0
ABDOMINAL PAIN: 0
DIARRHEA: 0
FEVER: 0
CONSTIPATION: 0
FREQUENCY: 0
DIZZINESS: 0
CHILLS: 0
EYE PAIN: 0
NERVOUS/ANXIOUS: 0
COUGH: 0
HEMATOCHEZIA: 0

## 2019-01-11 ASSESSMENT — MIFFLIN-ST. JEOR: SCORE: 1758.57

## 2019-01-11 ASSESSMENT — PATIENT HEALTH QUESTIONNAIRE - PHQ9: SUM OF ALL RESPONSES TO PHQ QUESTIONS 1-9: 3

## 2019-01-11 NOTE — PATIENT INSTRUCTIONS
Call to schedule appointment with the sleep specialist- this is located at the [facility]    Labs today for thyroid screening, blood counts, and vitamin D    Flu shot today      Preventive Health Recommendations  Female Ages 40 to 49    Yearly exam:     See your health care provider every year in order to  1. Review health changes.   2. Discuss preventive care.    3. Review your medicines if your doctor prescribed any.      Get a Pap test every three years (unless you have an abnormal result and your provider advises testing more often).      If you get Pap tests with HPV test, you only need to test every 5 years, unless you have an abnormal result. You do not need a Pap test if your uterus was removed (hysterectomy) and you have not had cancer.      You should be tested each year for STDs (sexually transmitted diseases), if you're at risk.     Ask your doctor if you should have a mammogram.      Have a colonoscopy (test for colon cancer) if someone in your family has had colon cancer or polyps before age 50.       Have a cholesterol test every 5 years.       Have a diabetes test (fasting glucose) after age 45. If you are at risk for diabetes, you should have this test every 3 years.    Shots: Get a flu shot each year. Get a tetanus shot every 10 years.     Nutrition:     Eat at least 5 servings of fruits and vegetables each day.    Eat whole-grain bread, whole-wheat pasta and brown rice instead of white grains and rice.    Get adequate Calcium and Vitamin D.      Lifestyle    Exercise at least 150 minutes a week (an average of 30 minutes a day, 5 days a week). This will help you control your weight and prevent disease.    Limit alcohol to one drink per day.    No smoking.     Wear sunscreen to prevent skin cancer.    See your dentist every six months for an exam and cleaning.    [facility]     Discharged by : Carley SIMMONS MA    If you have any questions regarding your visit please contact  your care team:     [facility]                Clinic Hours Telephone Number     Dr. Adam Garcia, CNP   7am-7pm  Monday - Thursday   7am-5pm  Fridays  [phone]   (Appointment scheduling available 24/7)     RN Line  [phone] option 2     [facility] [place] [facility] 11am-9pm Monday-Friday Saturday-Sunday- 9am-5pm     [place] -   5pm-9pm Monday-Friday Saturday-Sunday- 9am-5pm    [phone] - [place] Javier    [phone] - [place]     For a Price Quote for your services, please call our Consumer Price Line at [phone].     What options do I have for visits at the clinic other than the traditional office visit?     To expand how we care for you, many of our providers are utilizing electronic visits (e-visits) and telephone visits, when medically appropriate, for interactions with their patients rather than a visit in the clinic. We also offer nurse visits for many medical concerns. Just like any other service, we will bill your insurance company for this type of visit based on time spent on the phone with your provider. Not all insurance companies cover these visits. Please check with your medical insurance if this type of visit is covered. You will be responsible for any charges that are not paid by your insurance.     E-visits via LogicBayt: generally incur a $35.00 fee.     Telephone visits:  Time spent on the phone: *charged based on time that is spent on the phone in increments of 10 minutes. Estimated cost:   5-10 mins $30.00   11-20 mins. $59.00   21-30 mins. $85.00       Use Lucid Energyhart (secure email communication and access to your chart) to send your primary care provider a message or make an appointment. Ask someone on your Team how to sign up for LogicBayt.     As always, Thank you for trusting us with your health care needs!      [facility]:    Scheduling Appointments  [facility]  Call:  [phone]    [facility]  Call: [phone]    [facility]  Call: [phone]    For Gastroenterology referrals   [facility]   Clinics and Surgery Center, 4th Floor   [address] [place] [address]   Appointments: [phone]    WHERE TO GO FOR CARE?    Clinic    Make an appointment if you:       Are sick (cold, cough, flu, sore throat, earache or in pain).       Have a small injury (sprain, small cut, burn or broken bone).       Need a physical exam, Pap smear, vaccine or prescription refill.       Have questions about your health or medicines.    To reach us:      Call [phone] ([phone]). Open 24 hours every day. (For counseling services, call [phone].)    Log into [employer]oba at Litigain. (Visit Lifestyle Air.Cold Genesys to create an account.) Hospital emergency room    An emergency is a serious or life- threatening problem that must be treated right away.    Call 919 or get to the hospital if you have:      Very bad or sudden:            - Chest pain or pressure         - Bleeding         - Head or belly pain         - Dizziness or trouble seeing, walking or                          Speaking      Problems breathing      Blood in your vomit or you are coughing up blood      A major injury (knocked out, loss of a finger or limb, rape, broken bone protruding from skin)    A mental health crisis. (Or call the Mental Health Crisis line at 1-294.275.9602 or Suicide Prevention Hotline at 1-870.901.1625.)    Open 24 hours every day. You don't need an appointment.     Urgent care    Visit urgent care for sickness or small injuries when the clinic is closed. You don't need an appointment. To check hours or find an urgent care near you, visit [email or web address]. Online care    Get online care from [facility] for more than 70 common problems, like colds, allergies and infections. Open 24 hours every day at:   [email or web address]    Need help deciding?    For advice about where to be seen, you may call your clinic and ask to speak with a nurse. We're here for you 24 hours every day.         If you are deaf or hard of hearing, please let us know. We provide many free services including sign language interpreters, oral interpreters, TTYs, telephone amplifiers, note takers and written materials.

## 2019-01-11 NOTE — PROGRESS NOTES
"   SUBJECTIVE:   CC: Nazia Jasso is an 48 year old woman who presents for preventive health visit.     Physical   Annual:     Getting at least 3 servings of Calcium per day:  NO    Bi-annual eye exam:  Yes    Dental care twice a year:  Yes    Sleep apnea or symptoms of sleep apnea:  Daytime drowsiness and Excessive snoring    Diet:  Regular (no restrictions)    Frequency of exercise:  1 day/week    Duration of exercise:  15-30 minutes    Taking medications regularly:  Yes    Medication side effects:  Not applicable    Additional concerns today:  Yes    PHQ-2 Total Score: 0    Walking for exercise but very minimal.    Depression and Anxiety Follow-Up    Status since last visit: No change    Other associated symptoms:None    Complicating factors:     Significant life event: Yes-  Father passed away over the summer     Current substance abuse: None    PHQ 11/22/2016 10/24/2017 1/11/2019   PHQ-9 Total Score 0 1 3   Q9: Suicide Ideation Not at all Not at all Not at all     No flowsheet data found.    PHQ-9  English  PHQ-9   Any Language  ANDRES-7  Suicide Assessment Five-step Evaluation and Treatment (SAFE-T)      Other Concerns:  - says that she is snoring a lot-- sometimes she wakes herself up-- referral for sleep study?  -Energy level is low, bodyaches-- family history of Thyroid. Wondering about getting TSH checked again  -Got her period yesterday-- will come back for pap smear another time    Feels a \"Deep cold\" all the time.  Also lots of body aches.  Would like to go to an endocrinologist but her TSH in the past has been normal.  Asks if T3 can be checked today and she is ok with extra cost.  Has scoliosis that does cause her some pain.  But will also gets aches everywhere else.  Has pondered if she might have fibromyalgia.    Periods regular.  No hot flashes.    .    Today's PHQ-2 Score:   PHQ-2 ( 1999 Pfizer) 1/11/2019   Q1: Little interest or pleasure in doing things 0   Q2: " Feeling down, depressed or hopeless 0   PHQ-2 Score 0   Q1: Little interest or pleasure in doing things Not at all   Q2: Feeling down, depressed or hopeless Not at all   PHQ-2 Score 0       Abuse: Current or Past(Physical, Sexual or Emotional)- No  Do you feel safe in your environment? Yes    Social History     Tobacco Use     Smoking status: Never Smoker     Smokeless tobacco: Never Used   Substance Use Topics     Alcohol use: Yes     Comment: 1-2 drink per month     Alcohol Use 1/11/2019   If you drink alcohol do you typically have greater than 3 drinks per day OR greater than 7 drinks per week? No   No flowsheet data found.    Reviewed orders with patient.  Reviewed health maintenance and updated orders accordingly - Yes  BP Readings from Last 3 Encounters:   01/11/19 122/70   10/24/17 122/75   01/06/17 124/76    Wt Readings from Last 3 Encounters:   01/11/19 108 kg (238 lb)   10/24/17 106.1 kg (234 lb)   01/06/17 104.8 kg (231 lb)                  Patient Active Problem List   Diagnosis     CARDIOVASCULAR SCREENING; LDL GOAL LESS THAN 160     Mild major depression (H)     Family history of thyroid problem     ASCUS of cervix with negative high risk HPV     History reviewed. No pertinent surgical history.    Social History     Tobacco Use     Smoking status: Never Smoker     Smokeless tobacco: Never Used   Substance Use Topics     Alcohol use: Yes     Comment: 1-2 drink per month     Family History   Problem Relation Age of Onset     C.A.D. Paternal Grandfather      Cerebrovascular Disease Paternal Grandfather      C.A.D. Maternal Grandfather      Hypertension Mother      Hypertension Maternal Grandmother      Prostate Cancer Father      No Known Problems Sister      Hypothyroidism Sister      No Known Problems Brother      Diabetes No family hx of      Breast Cancer No family hx of      Cancer - colorectal No family hx of          Current Outpatient Medications   Medication Sig Dispense Refill     citalopram  "(CELEXA) 20 MG tablet TAKE 1 TABLET BY MOUTH EVERY DAY 90 tablet 1     Allergies   Allergen Reactions     Septra Ds [Sulfamethoxazole W-Trimethoprim] Hives     Happened at end of course from 12/01/2011-12/     Erythromycin      hives     Penicillins      hives       Mammogram Screening: Patient under age 50, mutual decision reflected in health maintenance.      Pertinent mammograms are reviewed under the imaging tab.  History of abnormal Pap smear: NO - age 30-65 PAP every 5 years with negative HPV co-testing recommended  PAP / HPV Latest Ref Rng & Units 6/17/2016   PAP - ASC-US(A)   HPV 16 DNA NEG Negative   HPV 18 DNA NEG Negative   OTHER HR HPV NEG Negative     Reviewed and updated as needed this visit by clinical staff  Tobacco  Allergies  Meds  Problems  Med Hx  Surg Hx  Fam Hx  Soc Hx          Reviewed and updated as needed this visit by Provider  Tobacco  Allergies  Meds  Problems  Med Hx  Surg Hx  Fam Hx            Review of Systems   Constitutional: Negative for chills and fever.   HENT: Negative for congestion and ear pain.    Eyes: Negative for pain.   Respiratory: Negative for cough.    Cardiovascular: Negative for chest pain.   Gastrointestinal: Negative for abdominal pain, constipation, diarrhea and hematochezia.   Genitourinary: Negative for frequency, genital sores and hematuria.   Neurological: Negative for dizziness.   Psychiatric/Behavioral: The patient is not nervous/anxious.    itching in ear canals- patient thinks it might be seborrheic dermatitis     OBJECTIVE:   /70   Pulse 72   Temp 98.1  F (36.7  C) (Oral)   Ht 1.72 m (5' 7.72\")   Wt 108 kg (238 lb)   LMP 01/10/2019   BMI 36.49 kg/m    Physical Exam  GENERAL: healthy, alert, no distress and obese  EYES: Eyes grossly normal to inspection, PERRL and conjunctivae and sclerae normal  HENT: ear canals and TM's normal, nose and mouth without ulcers or lesions  NECK: no adenopathy, no asymmetry, masses, or scars " and thyroid normal to palpation  RESP: lungs clear to auscultation - no rales, rhonchi or wheezes  BREAST: normal without masses, tenderness or nipple discharge and no palpable axillary masses or adenopathy  CV: regular rate and rhythm, normal S1 S2, no S3 or S4, no murmur, click or rub, no peripheral edema and peripheral pulses strong  ABDOMEN: soft, nontender, no hepatosplenomegaly, no masses and bowel sounds normal  MS: no gross musculoskeletal defects noted, no edema  SKIN: no suspicious lesions or rashes  NEURO: Normal strength and tone, mentation intact and speech normal  PSYCH: mentation appears normal, affect normal/bright      ASSESSMENT/PLAN:   1. Routine history and physical examination of adult    2. Major depressive disorder, recurrent episode, mild (H)  Chronic, stable, continue current treatment.    - citalopram (CELEXA) 20 MG tablet; TAKE 1 TABLET BY MOUTH EVERY DAY  Dispense: 90 tablet; Refill: 1    3. Class 2 obesity due to excess calories without serious comorbidity with body mass index (BMI) of 36.0 to 36.9 in adult  - Advised on starting to exercise, diet management.    4. Snoring    - SLEEP EVALUATION & MANAGEMENT REFERRAL - [facility]  [phone] (Age 15 and up); Future    5. Fatigue, unspecified type    - CBC with platelets and differential  - Vitamin D Deficiency  - TSH  - T4, free  - T3, total  - SLEEP EVALUATION & MANAGEMENT REFERRAL - [facility]  [phone] (Age 15 and up); Future    6. Generalized body aches    - Vitamin D Deficiency    7. Family history of thyroid disease    8. Screening for thyroid disorder    - TSH  - T4, free  - T3, total    9. Need for prophylactic vaccination and inoculation against influenza    - FLU VACCINE, SPLIT VIRUS, IM (QUADRIVALENT) [00818]- >3 YRS  - Vaccine Administration, Initial [83297]    COUNSELING:  Reviewed preventive health counseling, as reflected in patient instructions      "  Regular exercise       Healthy diet/nutrition    BP Readings from Last 1 Encounters:   01/11/19 122/70     Estimated body mass index is 36.49 kg/m  as calculated from the following:    Height as of this encounter: 1.72 m (5' 7.72\").    Weight as of this encounter: 108 kg (238 lb).      Weight management plan: Discussed healthy diet and exercise guidelines     reports that  has never smoked. she has never used smokeless tobacco.      Counseling Resources:  ATP IV Guidelines  Pooled Cohorts Equation Calculator  Breast Cancer Risk Calculator  FRAX Risk Assessment  ICSI Preventive Guidelines  Dietary Guidelines for Americans, 2010  USDA's MyPlate  ASA Prophylaxis  Lung CA Screening    Natalie Garcia, NP  [facility]  "

## 2019-01-13 ENCOUNTER — TELEPHONE (OUTPATIENT)
Dept: FAMILY MEDICINE | Facility: CLINIC | Age: 49
End: 2019-01-13

## 2019-01-13 DIAGNOSIS — E55.9 VITAMIN D DEFICIENCY: Primary | ICD-10-CM

## 2019-01-13 DIAGNOSIS — Z13.220 SCREENING FOR HYPERLIPIDEMIA: ICD-10-CM

## 2019-01-13 LAB
DEPRECATED CALCIDIOL+CALCIFEROL SERPL-MC: 18 UG/L (ref 20–75)
T4 FREE SERPL-MCNC: 1.04 NG/DL (ref 0.76–1.46)
TSH SERPL DL<=0.005 MIU/L-ACNC: 3.16 MU/L (ref 0.4–4)

## 2019-01-13 RX ORDER — ERGOCALCIFEROL 1.25 MG/1
50000 CAPSULE, LIQUID FILLED ORAL WEEKLY
Qty: 12 CAPSULE | Refills: 0 | Status: SHIPPED | OUTPATIENT
Start: 2019-01-13 | End: 2019-01-13

## 2019-01-13 RX ORDER — ERGOCALCIFEROL 1.25 MG/1
50000 CAPSULE, LIQUID FILLED ORAL WEEKLY
Qty: 8 CAPSULE | Refills: 0 | Status: SHIPPED | OUTPATIENT
Start: 2019-01-13 | End: 2019-04-11

## 2019-01-14 LAB
CHOLEST SERPL-MCNC: 159 MG/DL
HDLC SERPL-MCNC: 57 MG/DL
LDLC SERPL CALC-MCNC: 88 MG/DL
NONHDLC SERPL-MCNC: 102 MG/DL
TRIGL SERPL-MCNC: 70 MG/DL

## 2019-01-14 NOTE — TELEPHONE ENCOUNTER
Please call patient with lab results:    Her thyroid levels (TSH, free T4, and total T3) and blood counts are within normal limits.  Vitamin D level is low which can cause fatigue, muscle aches.  Please review the below treatment recommendation.    Vitamin D Deficiency Treatment:    Start weekly Vitamin D 50,000 units- take every 7 days for 8 weeks (Rx sent).  At the same time start a Multivitamin and take every day.  Follow up in 12 weeks for recheck of Vitamin D (ordered).

## 2019-01-14 NOTE — TELEPHONE ENCOUNTER
Patient is calling back because she forgot to ask what her cholesterol value was. She said it's okay to leave a detailed voicemail: [phone].    Thanks!  Micah Lewis

## 2019-01-14 NOTE — TELEPHONE ENCOUNTER
Please call patient to f/u from her call to us earlier today.  Let her know cholesterol levels are normal.  Ok to leave voicemail.    Natalie Garcia, DNP, APRN, CNP

## 2019-01-14 NOTE — TELEPHONE ENCOUNTER
I spoke with lab and they will add on Lipid panel.  Order signed.    Natalie Garcia, DNP, APRN, CNP

## 2019-02-28 ENCOUNTER — OFFICE VISIT (OUTPATIENT)
Dept: SLEEP MEDICINE | Facility: CLINIC | Age: 49
End: 2019-02-28
Payer: COMMERCIAL

## 2019-02-28 VITALS
HEART RATE: 57 BPM | SYSTOLIC BLOOD PRESSURE: 120 MMHG | WEIGHT: 238 LBS | HEIGHT: 68 IN | DIASTOLIC BLOOD PRESSURE: 82 MMHG | OXYGEN SATURATION: 98 % | BODY MASS INDEX: 36.07 KG/M2

## 2019-02-28 DIAGNOSIS — R06.83 SNORING: ICD-10-CM

## 2019-02-28 DIAGNOSIS — G47.30 SLEEP APNEA, UNSPECIFIED TYPE: ICD-10-CM

## 2019-02-28 DIAGNOSIS — Z72.820 LACK OF ADEQUATE SLEEP: ICD-10-CM

## 2019-02-28 DIAGNOSIS — E66.812 CLASS 2 OBESITY DUE TO EXCESS CALORIES WITH BODY MASS INDEX (BMI) OF 36.0 TO 36.9 IN ADULT, UNSPECIFIED WHETHER SERIOUS COMORBIDITY PRESENT: ICD-10-CM

## 2019-02-28 DIAGNOSIS — E66.09 CLASS 2 OBESITY DUE TO EXCESS CALORIES WITH BODY MASS INDEX (BMI) OF 36.0 TO 36.9 IN ADULT, UNSPECIFIED WHETHER SERIOUS COMORBIDITY PRESENT: ICD-10-CM

## 2019-02-28 DIAGNOSIS — R53.83 FATIGUE, UNSPECIFIED TYPE: ICD-10-CM

## 2019-02-28 DIAGNOSIS — R06.00 DYSPNEA AND RESPIRATORY ABNORMALITY: Primary | ICD-10-CM

## 2019-02-28 DIAGNOSIS — R06.89 DYSPNEA AND RESPIRATORY ABNORMALITY: Primary | ICD-10-CM

## 2019-02-28 PROCEDURE — 99244 OFF/OP CNSLTJ NEW/EST MOD 40: CPT | Performed by: PHYSICIAN ASSISTANT

## 2019-02-28 ASSESSMENT — MIFFLIN-ST. JEOR: SCORE: 1763.06

## 2019-02-28 NOTE — NURSING NOTE
"Chief Complaint   Patient presents with     Sleep Problem     consult- chronic fatigue and snoring       Initial /82   Pulse 57   Ht 1.727 m (5' 8\")   Wt 108 kg (238 lb)   SpO2 98%   BMI 36.19 kg/m   Estimated body mass index is 36.19 kg/m  as calculated from the following:    Height as of this encounter: 1.727 m (5' 8\").    Weight as of this encounter: 108 kg (238 lb).    Medication Reconciliation: complete    Neck circumference: 14 inches / 36 centimeters.      "

## 2019-02-28 NOTE — PROGRESS NOTES
"  Sleep Consultation:    Date on this visit: 2/28/2019    Nazia Jasso is sent by Natalie Garcia for a sleep consultation regarding snoring, fatigue/daytime sleepiness.    Primary Physician: Sarah Gregorio     CC: \"Snoring and chronic fatigue\"    Nazia goes to sleep at 10:00 PM during the week. She wakes up at 7:00 AM with an alarm. She falls asleep in 1-2 minutes.  Nazia denies difficulty falling asleep.  She wakes up 5 times a night for less than a minute before falling back to sleep.  Nazia wakes up to \"snoring and soreness\".  On weekends, Nazia goes to sleep at 10:00 PM.  She wakes up at 8:00 AM with an alarm. She falls asleep in 1-2 minutes.  Patient gets 8-9 hours of sleep per night. She does not feel refreshed.     Patient does read in bed and does not use electronics in bed and watch TV in bed.     Nazia does not do shift work.      Nazia does snore every night and snoring is very loud. Patient does have a regular bed partner. There is report of snoring.  She does have witnessed apneas. They occasionally sleep separately.  Patient sleeps on her back and side. She has occasional morning headaches (few times per month), denies morning confusion and restless legs. Nazia denies any sleep walking, sleep talking, dream enactment, sleep paralysis, cataplexy and hypnogogic/hypnopompic hallucinations. She has bruxism.     Nazia denies difficulty breathing through her nose, claustrophobia and reflux at night.      Nazia has gained 50-60 pounds in 10 years.  Patient describes themself as neither a morning or night person.  She would prefer to go to sleep at 9:00 PM and wake up at 8:00 AM.  Patient's [place] Sleepiness score 11/24 consistent with some daytime sleepiness.      Nazia naps occasionally.  She takes some inadvertant naps.  She denies falling asleep while driving.  Patient was counseled on the importance of driving while alert, to pull over if " drowsy, or nap before getting into the vehicle if sleepy.  She uses 1-2 sodas/day. Last caffeine intake is usually before noon.    Allergies:    Allergies   Allergen Reactions     Septra Ds [Sulfamethoxazole W-Trimethoprim] Hives     Happened at end of course from 12/01/2011-12/     Amoxicillin      Erythromycin      hives     Penicillins      hives       Medications:    Current Outpatient Medications   Medication Sig Dispense Refill     citalopram (CELEXA) 20 MG tablet TAKE 1 TABLET BY MOUTH EVERY DAY 90 tablet 1     vitamin D2 (ERGOCALCIFEROL) 45116 units (1250 mcg) capsule Take 1 capsule (50,000 Units) by mouth once a week 8 capsule 0       Problem List:  Patient Active Problem List    Diagnosis Date Noted     Class 2 obesity due to excess calories with body mass index (BMI) of 36.0 to 36.9 in adult, unspecified whether serious comorbidity present 02/28/2019     Priority: Medium     ASCUS of cervix with negative high risk HPV 06/17/2016     Priority: Medium     20's - Pt reported abnormal pap that was normal on follow up.  5/8/03 NIL  8/26/04 NIL  7/24/08 NIL  6/30/11 NIL  6/17/16 ASCUS/neg HR HPV.  Plan: cotest in 3 years.       Family history of thyroid problem 11/11/2011     Priority: Medium     Sister is hypothyroid       Mild major depression (H) 09/08/2010     Priority: Medium     CARDIOVASCULAR SCREENING; LDL GOAL LESS THAN 160 05/09/2010     Priority: Medium        Past Medical/Surgical History:  Past Medical History:   Diagnosis Date     ASCUS of cervix with negative high risk HPV 6/17/16 6/17/16 ASCUS/neg HR HPV.      Depressive disorder, not elsewhere classified     Depression (non-psychotic)     Endometriosis, site unspecified     Endometriosis     Female infertility of unspecified origin     Female infertility     No past surgical history on file.    Social History:  Social History     Socioeconomic History     Marital status:      Spouse name: james     Number of children: 0      Years of education: 18     Highest education level: Not on file   Occupational History     Occupation: manager     Employer: [employer]   Social Needs     Financial resource strain: Not on file     Food insecurity:     Worry: Not on file     Inability: Not on file     Transportation needs:     Medical: Not on file     Non-medical: Not on file   Tobacco Use     Smoking status: Never Smoker     Smokeless tobacco: Never Used   Substance and Sexual Activity     Alcohol use: Yes     Comment: 1-2 drink per month     Drug use: No     Sexual activity: Yes     Partners: Male   Lifestyle     Physical activity:     Days per week: Not on file     Minutes per session: Not on file     Stress: Not on file   Relationships     Social connections:     Talks on phone: Not on file     Gets together: Not on file     Attends Bahai service: Not on file     Active member of club or organization: Not on file     Attends meetings of clubs or organizations: Not on file     Relationship status: Not on file     Intimate partner violence:     Fear of current or ex partner: Not on file     Emotionally abused: Not on file     Physically abused: Not on file     Forced sexual activity: Not on file   Other Topics Concern     Parent/sibling w/ CABG, MI or angioplasty before 65F 55M? No   Social History Narrative     Not on file       Family History:  Family History   Problem Relation Age of Onset     C.A.D. Paternal Grandfather      Cerebrovascular Disease Paternal Grandfather      C.A.D. Maternal Grandfather      Hypertension Mother      Hypertension Maternal Grandmother      Prostate Cancer Father      No Known Problems Sister      Hypothyroidism Sister      No Known Problems Brother      Diabetes No family hx of      Breast Cancer No family hx of      Cancer - colorectal No family hx of        Review of Systems:  A complete review of systems reviewed by me is negative with the exeption of what has been mentioned in the history of  "present illness.  CONSTITUTIONAL:  POSITIVE for  weight gain and drug allergies(see list) and NEGATIVE for  fever , chills, sweats and night sweats  EYES: NEGATIVE for changes in vision, blind spots, double vision.  ENT: NEGATIVE for ear pain, sore throat, sinus pain, post-nasal drip, runny nose, bloody nose  CARDIAC: NEGATIVE for fast heartbeats or fluttering in chest, chest pain or pressure, breathlessness when lying flat, swollen legs or swollen feet.  NEUROLOGIC:  POSITIVE for  headaches and NEGATIVE for  weakness or numbness in the arms or legs  DERMATOLOGIC: NEGATIVE for rashes, new moles or change in mole(s)  PULMONARY: NEGATIVE SOB at rest, SOB with activity, dry cough, productive cough, coughing up blood, wheezing or whistling when breathing.    GASTROINTESTINAL: NEGATIVE for nausea or vomitting, loose or watery stools, fat or grease in stools, constipation, abdominal pain, bowel movements black in color or blood noted.  GENITOURINARY: NEGATIVE for pain during urination, blood in urine, urinating more frequently than usual, irregular menstrual periods.  MUSCULOSKELETAL:  POSITIVE for  muscle pain and bone or joint pain and NEGATIVE for  swollen joints  ENDOCRINE: NEGATIVE for increased thirst or urination, diabetes.  LYMPHATIC: NEGATIVE for swollen lymph nodes, lumps or bumps in the breasts or nipple discharge.    Physical Examination:  Vitals: /82   Pulse 57   Ht 1.727 m (5' 8\")   Wt 108 kg (238 lb)   SpO2 98%   BMI 36.19 kg/m    BMI= Body mass index is 36.19 kg/m .    Neck Cir (cm): 36 cm    [place] Total Score 2/28/2019   Total score - [place] 11       TANNER Total Score: 12 (02/28/19 1000)    GENERAL APPEARANCE: alert and no distress  EYES: Eyes grossly normal to inspection, PERRL and conjunctivae and sclerae normal  HENT: ear canals and TM's normal, nose and mouth without ulcers or lesions, oropharynx crowded and tongue base enlarged  NECK: no adenopathy and no asymmetry, masses, or " scars  RESP: lungs clear to auscultation - no rales, rhonchi or wheezes  CV: regular rates and rhythm and normal S1 S2, no S3 or S4  MS: extremities normal- no gross deformities noted  NEURO: Normal strength and tone, mentation intact and speech normal  PSYCH: mentation appears normal and affect normal/bright  Mallampati Class:   Tonsillar Stage:  Last Comprehensive Metabolic Panel:  Sodium   Date Value Ref Range Status   08/16/2005 141 133 - 144 mmol/L Final     Potassium   Date Value Ref Range Status   08/16/2005 4.4 3.4 - 5.3 mmol/L Final     Chloride   Date Value Ref Range Status   08/16/2005 109 94 - 109 mmol/L Final     Carbon Dioxide   Date Value Ref Range Status   08/16/2005 27 20 - 32 mmol/L Final     Anion Gap   Date Value Ref Range Status   08/16/2005 5 (L) 6 - 17 mmol/L Final     Glucose   Date Value Ref Range Status   06/09/2016 85 70 - 99 mg/dL Final     Urea Nitrogen   Date Value Ref Range Status   08/16/2005 9 5 - 24 mg/dL Final     Creatinine   Date Value Ref Range Status   08/16/2005 0.68 0.60 - 1.30 mg/dL Final     GFR Estimate   Date Value Ref Range Status   08/16/2005 >80 >60 mL/min/1.7m2 Final     Calcium   Date Value Ref Range Status   08/16/2005 8.4 (L) 8.5 - 10.4 mg/dL Final     TSH   Date Value Ref Range Status   01/11/2019 3.16 0.40 - 4.00 mU/L Final     Impression:  Patient has features and risk factors for possible obstructive sleep apnea including: BMI of 36, loud snoring, witnessed apnea, non-refreshing sleep, bruxism, daytime sleepiness(ESS 11), difficulty maintaining sleep, crowded oropharynx and occasional morning headaches.  The STOP-BANG score is 4/8.     Plan:    1. Polysomnogram (using 4% desaturation/Medicare/2012 AASM 1B scoring rules) for intermediate risk obstructive sleep apnea.  Patient is a poor candidate for Home Sleep Testing due to not high probability of severe KIYA.  2. Advised her against drowsy driving.    3. Recommend weight management.     Literature provided  regarding sleep apnea.      She will follow up with me in approximately two weeks after her sleep study has been competed to review the results and discuss plan of care.       Polysomnography reviewed.  Obstructive sleep apnea reviewed.  Complications of untreated sleep apnea were reviewed.    Bharat Huffman PA-C    CC: Natalie Garcia

## 2019-02-28 NOTE — PATIENT INSTRUCTIONS
Your BMI is Body mass index is 36.19 kg/m .  Weight management is a personal decision.  If you are interested in exploring weight loss strategies, the following discussion covers the approaches that may be successful. Body mass index (BMI) is one way to tell whether you are at a healthy weight, overweight, or obese. It measures your weight in relation to your height.  A BMI of 18.5 to 24.9 is in the healthy range. A person with a BMI of 25 to 29.9 is considered overweight, and someone with a BMI of 30 or greater is considered obese. More than two-thirds of American adults are considered overweight or obese.  Being overweight or obese increases the risk for further weight gain. Excess weight may lead to heart disease and diabetes.  Creating and following plans for healthy eating and physical activity may help you improve your health.  Weight control is part of healthy lifestyle and includes exercise, emotional health, and healthy eating habits. Careful eating habits lifelong are the mainstay of weight control. Though there are significant health benefits from weight loss, long-term weight loss with diet alone may be very difficult to achieve- studies show long-term success with dietary management in less than 10% of people. Attaining a healthy weight may be especially difficult to achieve in those with severe obesity. In some cases, medications, devices and surgical management might be considered.  What can you do?  If you are overweight or obese and are interested in methods for weight loss, you should discuss this with your provider.     Consider reducing daily calorie intake by 500 calories.     Keep a food journal.     Avoiding skipping meals, consider cutting portions instead.    Diet combined with exercise helps maintain muscle while optimizing fat loss. Strength training is particularly important for building and maintaining muscle mass. Exercise helps reduce stress, increase energy, and improves fitness.  Increasing exercise without diet control, however, may not burn enough calories to loose weight.       Start walking three days a week 10-20 minutes at a time    Work towards walking thirty minutes five days a week     Eventually, increase the speed of your walking for 1-2 minutes at time    In addition, we recommend that you review healthy lifestyles and methods for weight loss available through the National Institutes of Health patient information sites:  http://win.niddk.nih.gov/publications/index.htm    And look into health and wellness programs that may be available through your health insurance provider, employer, local community center, or yadiel club.    Weight management plan: Patient was referred to their PCP to discuss a diet and exercise plan.

## 2019-03-14 DIAGNOSIS — E66.09 CLASS 2 OBESITY DUE TO EXCESS CALORIES WITH BODY MASS INDEX (BMI) OF 36.0 TO 36.9 IN ADULT, UNSPECIFIED WHETHER SERIOUS COMORBIDITY PRESENT: ICD-10-CM

## 2019-03-14 DIAGNOSIS — R06.83 SNORING: ICD-10-CM

## 2019-03-14 DIAGNOSIS — G47.30 SLEEP APNEA, UNSPECIFIED TYPE: ICD-10-CM

## 2019-03-14 DIAGNOSIS — E55.9 VITAMIN D DEFICIENCY: ICD-10-CM

## 2019-03-14 DIAGNOSIS — E66.812 CLASS 2 OBESITY DUE TO EXCESS CALORIES WITH BODY MASS INDEX (BMI) OF 36.0 TO 36.9 IN ADULT, UNSPECIFIED WHETHER SERIOUS COMORBIDITY PRESENT: ICD-10-CM

## 2019-03-14 DIAGNOSIS — R06.89 DYSPNEA AND RESPIRATORY ABNORMALITY: ICD-10-CM

## 2019-03-14 DIAGNOSIS — R53.83 FATIGUE, UNSPECIFIED TYPE: ICD-10-CM

## 2019-03-14 DIAGNOSIS — Z72.820 LACK OF ADEQUATE SLEEP: ICD-10-CM

## 2019-03-14 DIAGNOSIS — R06.00 DYSPNEA AND RESPIRATORY ABNORMALITY: ICD-10-CM

## 2019-03-14 PROCEDURE — 82306 VITAMIN D 25 HYDROXY: CPT | Performed by: NURSE PRACTITIONER

## 2019-03-14 PROCEDURE — 36415 COLL VENOUS BLD VENIPUNCTURE: CPT | Performed by: NURSE PRACTITIONER

## 2019-03-15 LAB — DEPRECATED CALCIDIOL+CALCIFEROL SERPL-MC: 18 UG/L (ref 20–75)

## 2019-03-20 ENCOUNTER — TELEPHONE (OUTPATIENT)
Dept: FAMILY MEDICINE | Facility: CLINIC | Age: 49
End: 2019-03-20

## 2019-03-20 NOTE — TELEPHONE ENCOUNTER
Reason for Call:  Request for results:    Name of test or procedure: Labs     Date of test of procedure: 3/14     Location of the test or procedure: [facility] to leave the result message on voice mail or with a family member? YES    Phone number Patient can be reached at:  Cell number on file:    Telephone Information:   Mobile [phone]       Additional comments: Patient called in requesting to have someone call her to go over the results from her lab testing and see if any further steps need to be taken.     Call taken on 3/20/2019 at 3:43 PM by Destini Angelo

## 2019-03-26 ENCOUNTER — THERAPY VISIT (OUTPATIENT)
Dept: SLEEP MEDICINE | Facility: CLINIC | Age: 49
End: 2019-03-26
Payer: COMMERCIAL

## 2019-03-26 DIAGNOSIS — R06.00 DYSPNEA AND RESPIRATORY ABNORMALITY: ICD-10-CM

## 2019-03-26 DIAGNOSIS — R06.83 SNORING: ICD-10-CM

## 2019-03-26 DIAGNOSIS — R53.83 FATIGUE, UNSPECIFIED TYPE: ICD-10-CM

## 2019-03-26 DIAGNOSIS — R06.89 DYSPNEA AND RESPIRATORY ABNORMALITY: ICD-10-CM

## 2019-03-26 DIAGNOSIS — E66.812 CLASS 2 OBESITY DUE TO EXCESS CALORIES WITH BODY MASS INDEX (BMI) OF 36.0 TO 36.9 IN ADULT, UNSPECIFIED WHETHER SERIOUS COMORBIDITY PRESENT: ICD-10-CM

## 2019-03-26 DIAGNOSIS — G47.30 SLEEP APNEA, UNSPECIFIED TYPE: ICD-10-CM

## 2019-03-26 DIAGNOSIS — Z72.820 LACK OF ADEQUATE SLEEP: ICD-10-CM

## 2019-03-26 DIAGNOSIS — E66.09 CLASS 2 OBESITY DUE TO EXCESS CALORIES WITH BODY MASS INDEX (BMI) OF 36.0 TO 36.9 IN ADULT, UNSPECIFIED WHETHER SERIOUS COMORBIDITY PRESENT: ICD-10-CM

## 2019-03-26 PROCEDURE — 95810 POLYSOM 6/> YRS 4/> PARAM: CPT | Performed by: INTERNAL MEDICINE

## 2019-03-27 PROBLEM — G47.33 OSA (OBSTRUCTIVE SLEEP APNEA): Chronic | Status: ACTIVE | Noted: 2019-03-27

## 2019-03-27 NOTE — PROCEDURES
" SLEEP STUDY INTERPRETATION  DIAGNOSTIC POLYSOMNOGRAPHY REPORT      Patient: LONG NICHOLS  YOB: 1970  Study Date: 3/26/2019  MRN: [id number]  Referring Provider: -  Ordering Provider: Bharat REYES    Indications for Polysomnography: The patient is a 48 y old Female who is 5' 8\" and weighs 238.0 lbs. Her BMI is 36.5, Bayard sleepiness scale 11.0 and neck circumference is 36.0 cm. A diagnostic polysomnogram was performed to evaluate for loud snoring, witnessed apnea, non-refreshing sleep, bruxism, daytime sleepiness (ESS 11), difficulty maintaining sleep, crowded oropharynx and occasional morning headaches    Polysomnogram Data: A full night polysomnogram recorded the standard physiologic parameters including EEG, EOG, EMG, ECG, nasal and oral airflow. Respiratory parameters of chest and abdominal movements were recorded with respiratory inductance plethysmography. Oxygen saturation was recorded by pulse oximetry. Hypopnea scoring rule used: 1B 4%.    Sleep Architecture:   The total recording time of the polysomnogram was 451.3 minutes. The total sleep time was 413.5 minutes. Sleep latency was increased at 22.9 minutes without the use of a sleep aid. REM latency was 123.0 minutes. Arousal index was increased at 40.5 arousals per hour. Sleep efficiency was normal at 91.6%. Wake after sleep onset was 14.0 minutes. The patient spent 10.2% of total sleep time in Stage N1, 49.2% in Stage N2, 15.4% in Stage N3, and 25.3% in REM. Time in REM supine was 0 minutes.      Respiration:     Events ? The polysomnogram revealed a presence of 28 obstructive, 5 central, and 1 mixed apneas resulting in an apnea index of 4.9 events per hour. There were 90 obstructive hypopneas and 0 central hypopneas resulting in an obstructive hypopnea index of 13.1 and central hypopnea index of 0 events per hour. The combined apnea/hypopnea index was 18.0 events per hour (central apnea/hypopnea index was 0.7 events per " hour). The (lateral) REM AHI was 0 events per hour. The supine AHI was 69.1 events per hour. The RERA index was 8.4 events per hour.  The RDI was 26.4 events per hour.    Snoring - was reported as moderate to loud    Respiratory rate and pattern - was notable for normal respiratory rate and pattern.    Sustained Sleep Associated Hypoventilation - Transcutaneous carbon dioxide monitoring was not used, however significant hypoventilation was not suggested by oximetry    Sleep Associated Hypoxemia - (Greater than 5 minutes O2 sat at or below 88%) was not present. Baseline oxygen saturation was 94.8%. Lowest oxygen saturation was 82.4%. Time spent less than or equal to 88% was 2.4 minutes. Time spent less than or equal to 89% was 5.0 minutes.    Movement Activity:     Periodic Limb Activity - There were 84 PLMs during the entire study. The PLM index was 12.2 movements per hour. The PLM Arousal Index was 2.0 per hour.    REM EMG Activity - Excessive transient/sustained muscle activity was not present.    Nocturnal Behavior - Abnormal sleep related behaviors were not noted     Bruxism - None apparent.      Cardiac Summary:   The average pulse rate was 58.1 bpm. The minimum pulse rate was 49.0 bpm while the maximum pulse rate was 85.2 bpm.  Arrhythmias were not noted.      Assessment:     Moderate obstructive sleep apnea, supine predominant when it is severe    Periodic limb movements of sleep    Recommendations:    Consider repeat polysomnography with full night titration of positive airway pressure therapy for the control of sleep disordered breathing.    Based on the presence of moderate obstructive sleep apnea and excessive daytime sleepiness, treatment could be empirically initiated with Auto?titrating PAP therapy with a range of 5 to 18 cmH2O. Recommend clinical follow up with sleep management team.    Patient may be a candidate for dental appliance through referral to Sleep Dentistry for the treatment of obstructive  sleep apnea and/or socially disruptive snoring.    Positional therapy may be considered    If devices are not acceptable or effective, patient may benefit from evaluation of possible surgical options. If she is interested, would recommend referral to specialized ENT-Sleep provider.    Advice regarding the risks of drowsy driving.    Suggest optimizing sleep schedule and avoiding sleep deprivation.    Weight management (if BMI > 30).    Pharmacologic therapy should be used for management of restless legs syndrome only if present and clinically indicated and not based on the presence of periodic limb movements alone.    Diagnostic Codes:   Obstructive Sleep Apnea G47.33        _____________________________________   Electronically Signed By: Jaiden An MD 3/27/19           Range(%) Time in range (min)   0.0 - 89.0 5.0   0.0 - 88.0 2.4         Stage Min(mm Hg) Max(mm Hg)   Wake - -   NREM(1+2+3) - -   REM - -       Range(mmHg) Time in range (min)   55.0 - 100.0 -   Excluded data <20.0 & >65.0 451.5

## 2019-03-28 LAB — SLPCOMP: NORMAL

## 2019-04-11 ENCOUNTER — VIRTUAL VISIT (OUTPATIENT)
Dept: SLEEP MEDICINE | Facility: CLINIC | Age: 49
End: 2019-04-11
Payer: COMMERCIAL

## 2019-04-11 DIAGNOSIS — G47.33 OSA (OBSTRUCTIVE SLEEP APNEA): ICD-10-CM

## 2019-04-11 PROCEDURE — 98966 PH1 ASSMT&MGMT NQHP 5-10: CPT | Performed by: PHYSICIAN ASSISTANT

## 2019-04-11 NOTE — PROGRESS NOTES
"Nazia Jasso is a 48 year old female who is being evaluated via a billable telephone visit.      The patient has been notified of following:     \"This telephone visit will be conducted via a call between you and your physician/provider. We have found that certain health care needs can be provided without the need for a physical exam.  This service lets us provide the care you need with a short phone conversation.  If a prescription is necessary we can send it directly to your pharmacy.  If lab work is needed we can place an order for that and you can then stop by our lab to have the test done at a later time.    If during the course of the call the physician/provider feels a telephone visit is not appropriate, you will not be charged for this service.\"     Consent has been obtained for this service by 1 care team member: yes. See the scanned image in the medical record.    Nazia Jasso complains of    Chief Complaint   Patient presents with     Results       I have reviewed and updated the patient's Past Medical History, Social History, Family History and Medication List.    ALLERGIES  Septra ds [sulfamethoxazole w-trimethoprim]; Amoxicillin; Erythromycin; and Penicillins    Susan Montes   (MA signature)      Total time of call between patient and provider was 7 minutes         Sleep Study Follow-Up Phone Visit:    Date on this visit: 4/11/2019    Nazia Jasso had a sleep study done  sleep study done on 3/26/2019 at the [facility] for for loud snoring, witnessed apnea, non-refreshing sleep, bruxism, daytime sleepiness (ESS 11), difficulty maintaining sleep, crowded oropharynx and occasional morning headaches.    Diagnostic PSG:  Sleep Architecture:   The total recording time of the polysomnogram was 451.3 minutes. The total sleep time was 413.5 minutes. Sleep latency was increased at 22.9 minutes without the use of a sleep aid. REM latency was 123.0 " minutes. Arousal index was increased at 40.5 arousals per hour. Sleep efficiency was normal at 91.6%. Wake after sleep onset was 14.0 minutes. The patient spent 10.2% of total sleep time in Stage N1, 49.2% in Stage N2, 15.4% in Stage N3, and 25.3% in REM. Time in REM supine was 0 minutes.   Respiration:     Events ? The polysomnogram revealed a presence of 28 obstructive, 5 central, and 1 mixed apneas resulting in an apnea index of 4.9 events per hour. There were 90 obstructive hypopneas and 0 central hypopneas resulting in an obstructive hypopnea index of 13.1 and central hypopnea index of 0 events per hour. The combined apnea/hypopnea index was 18.0 events per hour (central apnea/hypopnea index was 0.7 events per hour). The (lateral) REM AHI was 0 events per hour. The supine AHI was 69.1 events per hour. The RERA index was 8.4 events per hour.  The RDI was 26.4 events per hour.    Snoring - was reported as moderate to loud    Respiratory rate and pattern - was notable for normal respiratory rate and pattern.    Sustained Sleep Associated Hypoventilation - Transcutaneous carbon dioxide monitoring was not used, however significant hypoventilation was not suggested by oximetry    Sleep Associated Hypoxemia - (Greater than 5 minutes O2 sat at or below 88%) was not present. Baseline oxygen saturation was 94.8%. Lowest oxygen saturation was 82.4%. Time spent less than or equal to 88% was 2.4 minutes. Time spent less than or equal to 89% was 5.0 minutes.     Movement Activity:     Periodic Limb Activity - There were 84 PLMs during the entire study. The PLM index was 12.2 movements per hour. The PLM Arousal Index was 2.0 per hour.    REM EMG Activity - Excessive transient/sustained muscle activity was not present.    Nocturnal Behavior - Abnormal sleep related behaviors were not noted     Bruxism - None apparent.   Cardiac Summary:   The average pulse rate was 58.1 bpm. The minimum pulse rate was 49.0 bpm while the  maximum pulse rate was 85.2 bpm.  Arrhythmias were not noted.    These findings were reviewed with patient.     Past medical/surgical history, family history, social history, medications and allergies were reviewed.      Problem List:  Patient Active Problem List    Diagnosis Date Noted     KIYA (obstructive sleep apnea)- moderate (AHI 18) 03/27/2019     Priority: Medium     3/26/2019 [facility] (238.0 lbs) - AHI 18.0, RDI 26.4, Supine AHI 69.1, REM AHI -, Low O2 82.4%, Time Spent ?88% 2.4 minutes / Time Spent ?89% 5.0 minutes.       Class 2 obesity due to excess calories with body mass index (BMI) of 36.0 to 36.9 in adult, unspecified whether serious comorbidity present 02/28/2019     Priority: Medium     Snoring 02/28/2019     Priority: Medium     2/28/19:  Evaluated at sleep clinic.  Plan: Sleep study       ASCUS of cervix with negative high risk HPV 06/17/2016     Priority: Medium     20's - Pt reported abnormal pap that was normal on follow up.  5/8/03 NIL  8/26/04 NIL  7/24/08 NIL  6/30/11 NIL  6/17/16 ASCUS/neg HR HPV.  Plan: cotest in 3 years.       Family history of thyroid problem 11/11/2011     Priority: Medium     Sister is hypothyroid       Mild major depression (H) 09/08/2010     Priority: Medium     CARDIOVASCULAR SCREENING; LDL GOAL LESS THAN 160 05/09/2010     Priority: Medium        Impression/Plan:  1. Moderate obstructive sleep apnea, supine predominant when it is severe-  Treatment options reviewed including mandibular advancement device, auto-CPAP or all night titration polysomnogram.   Elected treatment with mandibular advancement device and positional therapy.       2. Patient had elevated periodic limp movements during the study. The majority of these were not associated with cortical arousal. Patient denies wakeful motor restlessness. Will follow.      She will follow up with me once MAD is constructed to re-evaluate KIYA.    Bharat Huffman PA-C

## 2019-04-24 ENCOUNTER — OFFICE VISIT (OUTPATIENT)
Dept: FAMILY MEDICINE | Facility: CLINIC | Age: 49
End: 2019-04-24
Payer: COMMERCIAL

## 2019-04-24 VITALS
TEMPERATURE: 98.7 F | HEIGHT: 68 IN | HEART RATE: 88 BPM | WEIGHT: 234 LBS | DIASTOLIC BLOOD PRESSURE: 74 MMHG | SYSTOLIC BLOOD PRESSURE: 106 MMHG | BODY MASS INDEX: 35.46 KG/M2

## 2019-04-24 DIAGNOSIS — J02.0 STREPTOCOCCAL SORE THROAT: ICD-10-CM

## 2019-04-24 DIAGNOSIS — J15.9 COMMUNITY ACQUIRED BACTERIAL PNEUMONIA: Primary | ICD-10-CM

## 2019-04-24 LAB
DEPRECATED S PYO AG THROAT QL EIA: ABNORMAL
SPECIMEN SOURCE: ABNORMAL

## 2019-04-24 PROCEDURE — 99214 OFFICE O/P EST MOD 30 MIN: CPT | Performed by: FAMILY MEDICINE

## 2019-04-24 PROCEDURE — 87880 STREP A ASSAY W/OPTIC: CPT | Performed by: FAMILY MEDICINE

## 2019-04-24 RX ORDER — AZITHROMYCIN 250 MG/1
TABLET, FILM COATED ORAL
Qty: 6 TABLET | Refills: 0 | Status: SHIPPED | OUTPATIENT
Start: 2019-04-24 | End: 2019-08-28

## 2019-04-24 ASSESSMENT — MIFFLIN-ST. JEOR: SCORE: 1744.92

## 2019-04-24 ASSESSMENT — PAIN SCALES - GENERAL: PAINLEVEL: MILD PAIN (2)

## 2019-04-24 NOTE — PROGRESS NOTES
SUBJECTIVE:   Nazia Jasso is a 48 year old female who presents to clinic today for the following   health issues:    Acute Illness   Acute illness concerns: Possible strep  Onset: 2.5 weeks ago    Fever: no     Chills/Sweats: no     Headache (location?): no     Sinus Pressure:YES    Conjunctivitis:  no    Ear Pain: no    Rhinorrhea: no     Congestion: no     Sore Throat: YES     Cough: YES    Wheeze: YES    Decreased Appetite: YES    Nausea: no     Vomiting: no     Diarrhea:  no     Dysuria/Freq.: no     Fatigue/Achiness: YES    Sick/Strep Exposure: YES- Son is sick     Therapies Tried and outcome: Kathryn sevilla helped a little.     Patient says she had pneumonia in the past.     Patient Active Problem List   Diagnosis     CARDIOVASCULAR SCREENING; LDL GOAL LESS THAN 160     Mild major depression (H)     Family history of thyroid problem     ASCUS of cervix with negative high risk HPV     Class 2 obesity due to excess calories with body mass index (BMI) of 36.0 to 36.9 in adult, unspecified whether serious comorbidity present     Snoring     KIYA (obstructive sleep apnea)- moderate (AHI 18)     No past surgical history on file.    Social History     Tobacco Use     Smoking status: Never Smoker     Smokeless tobacco: Never Used   Substance Use Topics     Alcohol use: Yes     Comment: 1-2 drink per month     Family History   Problem Relation Age of Onset     C.A.D. Paternal Grandfather      Cerebrovascular Disease Paternal Grandfather      C.A.D. Maternal Grandfather      Hypertension Mother      Hypertension Maternal Grandmother      Prostate Cancer Father      No Known Problems Sister      Hypothyroidism Sister      No Known Problems Brother      Diabetes No family hx of      Breast Cancer No family hx of      Cancer - colorectal No family hx of            ROS:  Constitutional, HEENT, cardiovascular, pulmonary, gi and gu systems are negative, except as otherwise noted.    OBJECTIVE:     /74 (BP  "Location: Right arm, Patient Position: Chair, Cuff Size: Adult Large)   Pulse 88   Temp 98.7  F (37.1  C) (Oral)   Ht 1.727 m (5' 8\")   Wt 106.1 kg (234 lb)   LMP 04/13/2019 (Exact Date)   Breastfeeding? No   BMI 35.58 kg/m    Body mass index is 35.58 kg/m .  GENERAL: healthy, alert and no distress  HENT: normal cephalic/atraumatic, ear canals and TM's normal, nose and mouth without ulcers or lesions, oropharynx clear, oral mucous membranes moist, tonsillar hypertrophy and tonsillar erythema  NECK: bilateral anterior cervical adenopathy  RESP: Mild inspiratory wheezing and crackles in right lung   CV: regular rates and rhythm, normal S1 S2, no S3 or S4 and no murmur, click or rub    Diagnostic Test Results:  Results for orders placed or performed in visit on 04/24/19 (from the past 24 hour(s))   Rapid strep screen   Result Value Ref Range    Specimen Description Throat     Rapid Strep A Screen (A)      POSITIVE: Group A Streptococcal antigen detected by immunoassay.       ASSESSMENT/PLAN:       ICD-10-CM    1. Community acquired bacterial pneumonia J15.9 Rapid strep screen     azithromycin (ZITHROMAX) 250 MG tablet   2. Streptococcal sore throat J02.0      Patient has an allergy to PCN and erythromycin, but has tolerated cephalosporins and azithromycin in the past.  Will treat with Zpack.  Advised follow up next week if worsening or not improving     Aurelia Santos, DO  [facility]        "

## 2019-04-24 NOTE — PATIENT INSTRUCTIONS
Pneumonia (Adult)  Pneumonia is an infection deep within the lungs. It is in the small air sacs (alveoli). Pneumonia may be caused by a virus or bacteria. Pneumonia caused by bacteria is usually treated with an antibiotic. Severe cases may need to be treated in the hospital. Milder cases can be treated at home. Symptoms usually start to get better during the first 2 days of treatment.    Home care  Follow these guidelines when caring for yourself at home:    Rest at home for the first 2 to 3 days, or until you feel stronger. Don t let yourself get overly tired when you go back to your activities.    Stay away from cigarette smoke - yours or other people s.    You may use acetaminophen or ibuprofen to control fever or pain, unless another medicine was prescribed. If you have chronic liver or kidney disease, talk with your healthcare provider before using these medicines. Also talk with your provider if you ve had a stomach ulcer or gastrointestinal bleeding. Don t give aspirin to anyone younger than 18 years of age who is ill with a fever. It may cause severe liver damage.    Your appetite may be poor, so a light diet is fine.    Drink 6 to 8 glasses of fluids every day to make sure you are getting enough fluids. Beverages can include water, sport drinks, sodas without caffeine, juices, tea, or soup. Fluids will help loosen secretions in the lung. This will make it easier for you to cough up the phlegm (sputum). If you also have heart or kidney disease, check with your healthcare provider before you drink extra fluids.    Take antibiotic medicine prescribed until it is all gone, even if you are feeling better after a few days.  Follow-up care  Follow up with your healthcare provider in the next 2 to 3 days, or as advised. This is to be sure the medicine is helping you get better.  If you are 65 or older, you should get a pneumococcal vaccine and a yearly flu (influenza) shot. You should also get these vaccines if you  have chronic lung disease like asthma, emphysema, or COPD. Recently, a second type of pneumonia vaccine has become available for everyone over 65 years old. This is in addition to the previous vaccine. Ask your provider about this.  When to seek medical advice  Call your healthcare provider right away if any of these occur:    You don t get better within the first 48 hours of treatment    Shortness of breath gets worse    Rapid breathing (more than 25 breaths per minute)    Coughing up blood    Chest pain gets worse with breathing    Fever of 100.4 F (38 C) or higher that doesn t get better with fever medicine    Weakness, dizziness, or fainting that gets worse    Thirst or dry mouth that gets worse    Sinus pain, headache, or a stiff neck    Chest pain not caused by coughing  Date Last Reviewed: 1/1/2017 2000-2018 [employer]. [address] [place] [address], [place] [address]. All rights reserved. This information is not intended as a substitute for professional medical care. Always follow your healthcare professional's instructions.    Pharyngitis: Strep (Confirmed)  You have had a positive test for strep throat. Strep throat is a contagious illness. It is spread by coughing, kissing or by touching others after touching your mouth or nose. Symptoms include throat pain that is worse with swallowing, aching all over, headache, and fever. It is treated with antibiotic medicine. This should help you start to feel better in 1 to 2 days.  Home care    Rest at home. Drink plenty of fluids to you won't get dehydrated.    No work or school for the first 2 days of taking the antibiotics. After this time, you will not be contagious. You can then return to school or work if you are feeling better.     Take antibiotic medicine for the full 10 days, even if you feel better. This is very important to ensure the infection is treated. It is also important to prevent medicine-resistant germs from developing. If you were  given an antibiotic shot, you don't need any more antibiotics.    You may use acetaminophen or ibuprofen to control pain or fever, unless another medicine was prescribed for this. Talk with your healthcare provider before taking these medicines if you have chronic liver or kidney disease. Also talk with your healthcare provider if you have had a stomach ulcer or GI bleeding.    Throat lozenges or sprays help reduce pain. Gargling with warm saltwater will also reduce throat pain. Dissolve 1/2 teaspoon of salt in 1 glass of warm water. This may be useful just before meals.     Soft foods are OK. Don't eat salty or spicy foods.  Follow-up care  Follow up with your healthcare provider or our staff if you don't get better over the next week.  When to seek medical advice  Call your healthcare provider right away if any of these occur:    Fever of 100.4 F (38 C) or higher, or as directed by your healthcare provider    New or worsening ear pain, sinus pain, or headache    Painful lumps in the back of neck    Stiff neck    Lymph nodes getting larger or becoming soft in the middle    You can't swallow liquids or you can't open your mouth wide because of throat pain    Signs of dehydration. These include very dark urine or no urine, sunken eyes, and dizziness.    Trouble breathing or noisy breathing    Muffled voice    Rash  Prevention  Here are steps you can take to help prevent an infection:    Keep good hand washing habits.    Don t have close contact with people who have sore throats, colds, or other upper respiratory infections.    Don t smoke, and stay away from secondhand smoke.  Date Last Reviewed: 11/1/2017 2000-2018 [employer]. [address] [place] [address], [place] [address]. All rights reserved. This information is not intended as a substitute for professional medical care. Always follow your healthcare professional's instructions.

## 2019-05-08 ENCOUNTER — TRANSFERRED RECORDS (OUTPATIENT)
Dept: HEALTH INFORMATION MANAGEMENT | Facility: CLINIC | Age: 49
End: 2019-05-08

## 2019-07-12 ENCOUNTER — ANCILLARY PROCEDURE (OUTPATIENT)
Dept: MAMMOGRAPHY | Facility: CLINIC | Age: 49
End: 2019-07-12
Attending: FAMILY MEDICINE
Payer: COMMERCIAL

## 2019-07-12 DIAGNOSIS — Z12.31 VISIT FOR SCREENING MAMMOGRAM: ICD-10-CM

## 2019-07-12 PROCEDURE — 77067 SCR MAMMO BI INCL CAD: CPT | Mod: TC

## 2019-07-12 PROCEDURE — 77063 BREAST TOMOSYNTHESIS BI: CPT | Mod: TC

## 2019-08-15 ENCOUNTER — TELEPHONE (OUTPATIENT)
Dept: FAMILY MEDICINE | Facility: CLINIC | Age: 49
End: 2019-08-15

## 2019-08-15 ENCOUNTER — TRANSFERRED RECORDS (OUTPATIENT)
Dept: HEALTH INFORMATION MANAGEMENT | Facility: CLINIC | Age: 49
End: 2019-08-15

## 2019-08-15 DIAGNOSIS — F33.0 MAJOR DEPRESSIVE DISORDER, RECURRENT EPISODE, MILD (H): ICD-10-CM

## 2019-08-15 NOTE — TELEPHONE ENCOUNTER
"Requested Prescriptions   Pending Prescriptions Disp Refills     citalopram (CELEXA) 20 MG tablet  Last Written Prescription Date:  1/11/2019  Last Fill Quantity: 90 tablet,  # refills: 1   Last office visit: 1/11/2019 with prescribing provider:  RAOUL Garcia   Future Office Visit:     90 tablet 1     Sig: TAKE 1 TABLET BY MOUTH EVERY DAY       SSRIs Protocol Failed - 8/15/2019  8:42 AM        Failed - PHQ-9 score less than 5 in past 6 months     Please review last PHQ-9 score.   PHQ-9 SCORE 11/22/2016 10/24/2017 1/11/2019   PHQ-9 Total Score - - -   PHQ-9 Total Score 0 1 3     No flowsheet data found.                Passed - Medication is active on med list        Passed - Patient is age 18 or older        Passed - No active pregnancy on record        Passed - No positive pregnancy test in last 12 months        Passed - Recent (6 mo) or future (30 days) visit within the authorizing provider's specialty     Patient had office visit in the last 6 months or has a visit in the next 30 days with authorizing provider or within the authorizing provider's specialty.  See \"Patient Info\" tab in inbasket, or \"Choose Columns\" in Meds & Orders section of the refill encounter.              "

## 2019-08-20 NOTE — TELEPHONE ENCOUNTER
MA/LPN,    Please contact patient for a PHQ-9 and then route refill request to RN.    Thank you,    Bjorn Doss RN

## 2019-08-28 ENCOUNTER — OFFICE VISIT (OUTPATIENT)
Dept: SLEEP MEDICINE | Facility: CLINIC | Age: 49
End: 2019-08-28
Payer: COMMERCIAL

## 2019-08-28 VITALS
WEIGHT: 244 LBS | BODY MASS INDEX: 36.14 KG/M2 | HEIGHT: 69 IN | HEART RATE: 65 BPM | DIASTOLIC BLOOD PRESSURE: 56 MMHG | OXYGEN SATURATION: 97 % | SYSTOLIC BLOOD PRESSURE: 105 MMHG

## 2019-08-28 DIAGNOSIS — G47.33 OSA (OBSTRUCTIVE SLEEP APNEA): Primary | ICD-10-CM

## 2019-08-28 PROCEDURE — 99214 OFFICE O/P EST MOD 30 MIN: CPT | Performed by: PHYSICIAN ASSISTANT

## 2019-08-28 ASSESSMENT — MIFFLIN-ST. JEOR: SCORE: 1793.22

## 2019-08-28 NOTE — PATIENT INSTRUCTIONS
Your BMI is Body mass index is 36.56 kg/m .  Weight management is a personal decision.  If you are interested in exploring weight loss strategies, the following discussion covers the approaches that may be successful. Body mass index (BMI) is one way to tell whether you are at a healthy weight, overweight, or obese. It measures your weight in relation to your height.  A BMI of 18.5 to 24.9 is in the healthy range. A person with a BMI of 25 to 29.9 is considered overweight, and someone with a BMI of 30 or greater is considered obese. More than two-thirds of American adults are considered overweight or obese.  Being overweight or obese increases the risk for further weight gain. Excess weight may lead to heart disease and diabetes.  Creating and following plans for healthy eating and physical activity may help you improve your health.  Weight control is part of healthy lifestyle and includes exercise, emotional health, and healthy eating habits. Careful eating habits lifelong are the mainstay of weight control. Though there are significant health benefits from weight loss, long-term weight loss with diet alone may be very difficult to achieve- studies show long-term success with dietary management in less than 10% of people. Attaining a healthy weight may be especially difficult to achieve in those with severe obesity. In some cases, medications, devices and surgical management might be considered.  What can you do?  If you are overweight or obese and are interested in methods for weight loss, you should discuss this with your provider.     Consider reducing daily calorie intake by 500 calories.     Keep a food journal.     Avoiding skipping meals, consider cutting portions instead.    Diet combined with exercise helps maintain muscle while optimizing fat loss. Strength training is particularly important for building and maintaining muscle mass. Exercise helps reduce stress, increase energy, and improves fitness.  Increasing exercise without diet control, however, may not burn enough calories to loose weight.       Start walking three days a week 10-20 minutes at a time    Work towards walking thirty minutes five days a week     Eventually, increase the speed of your walking for 1-2 minutes at time    In addition, we recommend that you review healthy lifestyles and methods for weight loss available through the National Institutes of Health patient information sites:  http://win.niddk.nih.gov/publications/index.htm    And look into health and wellness programs that may be available through your health insurance provider, employer, local community center, or yadiel club.    Weight management plan: Patient was referred to their PCP to discuss a diet and exercise plan.

## 2019-08-28 NOTE — NURSING NOTE
"Chief Complaint   Patient presents with     RECHECK     oral appliance f/u        Initial /56   Pulse 65   Ht 1.74 m (5' 8.5\")   Wt 110.7 kg (244 lb)   SpO2 97%   BMI 36.56 kg/m   Estimated body mass index is 36.56 kg/m  as calculated from the following:    Height as of this encounter: 1.74 m (5' 8.5\").    Weight as of this encounter: 110.7 kg (244 lb).    Medication Reconciliation: complete      "

## 2019-08-28 NOTE — PROGRESS NOTES
Obstructive Sleep Apnea - Oral Appliance  Chief Complaint   Patient presents with     RECHECK     oral appliance f/u        Nazia Jasso comes in today for follow-up of their moderate sleep apnea, managed with mandibular advancement device.     She initially presented with loud snoring, witnessed apnea, non-refreshing sleep, bruxism, daytime sleepiness (ESS 11), difficulty maintaining sleep, crowded oropharynx and occasional morning headaches.    3/26/2019 [facility] (238.0 lbs) - AHI 18.0, RDI 26.4, Supine AHI 69.1, REM AHI -, Low O2 82.4%, Time Spent =88% 2.4 minutes / Time Spent =89% 5.0 minutes.    She elected treatment with mandibular advancement device. MAD constructed at [facility]. She states that she is seeing good results with significant decrease in daytime sleepiness and no longer waking up in the middle of the night. She noted some light snoring, but recommended not to advance the device. There longer witnessed apneas.     Bedtime is typically 2230. Usually it takes about 10 minutes to fall asleep with the mask on. Wake time is typically 0800.The patient is usually getting 9 hours of sleep per night.    She does feel rested in the morning.    Total score - [place]: 3 (8/28/2019  3:00 PM)    TANNER Total Score: 6    Past medical/surgical history, family history, social history, medications and allergies were reviewed.      Problem List:  Patient Active Problem List    Diagnosis Date Noted     KIYA (obstructive sleep apnea)- moderate (AHI 18) 03/27/2019     Priority: Medium     3/26/2019 [facility] Study (238.0 lbs) - AHI 18.0, RDI 26.4, Supine AHI 69.1, REM AHI -, Low O2 82.4%, Time Spent ?88% 2.4 minutes / Time Spent ?89% 5.0 minutes.       Class 2 obesity due to excess calories with body mass index (BMI) of 36.0 to 36.9 in adult, unspecified whether serious comorbidity present 02/28/2019     Priority: Medium     Snoring 02/28/2019     Priority:  "Medium     2/28/19:  Evaluated at sleep clinic.  Plan: Sleep study       ASCUS of cervix with negative high risk HPV 06/17/2016     Priority: Medium     20's - Pt reported abnormal pap that was normal on follow up.  5/8/03 NIL  8/26/04 NIL  7/24/08 NIL  6/30/11 NIL  6/17/16 ASCUS/neg HR HPV.  Plan: cotest in 3 years.       Family history of thyroid problem 11/11/2011     Priority: Medium     Sister is hypothyroid       Mild major depression (H) 09/08/2010     Priority: Medium     CARDIOVASCULAR SCREENING; LDL GOAL LESS THAN 160 05/09/2010     Priority: Medium        /56   Pulse 65   Ht 1.74 m (5' 8.5\")   Wt 110.7 kg (244 lb)   SpO2 97%   BMI 36.56 kg/m      Impression/Plan:    Moderate obstructive sleep apnea by diagnostic Sleep Study on 3/26/2019 (238.0 lbs) - AHI 18.0, RDI 26.4, Supine AHI 69.1, REM AHI -, Low O2 82.4%, Time Spent =88% 2.4 minutes / Time Spent =89% 5.0 minutes. Currently treated with mandibular advancement device. Recommend Home Sleep Apnea Testing to re-evalaute KIYA.    Nazia Jasso will follow once the testing is complete    Twenty-five minutes spent with patient, all of which were spent face-to-face counseling, consulting, coordinating plan of care regarding KIYA.      Bharat Huffman PA-C    "

## 2019-08-29 RX ORDER — CITALOPRAM HYDROBROMIDE 20 MG/1
TABLET ORAL
Qty: 30 TABLET | Refills: 0 | Status: SHIPPED | OUTPATIENT
Start: 2019-08-29 | End: 2019-09-25

## 2019-08-29 ASSESSMENT — PATIENT HEALTH QUESTIONNAIRE - PHQ9: SUM OF ALL RESPONSES TO PHQ QUESTIONS 1-9: 1

## 2019-08-29 NOTE — TELEPHONE ENCOUNTER
Reason for Call:  Other     Detailed comments: Patient returning call to see if PHQ still needs to be completed, Please advise.     Phone Number Patient can be reached at: Home number on file [phone] (home)    Best Time: Anytime    Can we leave a detailed message on this number? YES    Call taken on 8/29/2019 at 1:37 PM by Leslie Qiu

## 2019-08-29 NOTE — TELEPHONE ENCOUNTER
Left message on answering machine for patient to call back.  Yajaira Madrigal CMA ([facility])

## 2019-08-29 NOTE — TELEPHONE ENCOUNTER
Contacted the patient over the phone, PHQ9 completed - the score is 1.  Yajaira Madrigal CMA ([facility])

## 2019-09-11 ENCOUNTER — OFFICE VISIT (OUTPATIENT)
Dept: SLEEP MEDICINE | Facility: CLINIC | Age: 49
End: 2019-09-11
Payer: COMMERCIAL

## 2019-09-11 DIAGNOSIS — G47.33 OSA (OBSTRUCTIVE SLEEP APNEA): ICD-10-CM

## 2019-09-11 PROCEDURE — G0399 HOME SLEEP TEST/TYPE 3 PORTA: HCPCS | Performed by: INTERNAL MEDICINE

## 2019-09-11 NOTE — PROGRESS NOTES
Pt is completing a home sleep test. Pt was instructed on how to put on the Noxturnal T3 device and associated equipment before going to bed and given the opportunity to practice putting it on before leaving the sleep center. Pt was reminded to bring the home sleep test kit back to the center tomorrow, at agreed upon time for download and reporting.     Carmina Meléndez MA on 9/11/2019 at 2:05 PM

## 2019-09-12 ENCOUNTER — APPOINTMENT (OUTPATIENT)
Dept: SLEEP MEDICINE | Facility: CLINIC | Age: 49
End: 2019-09-12
Payer: COMMERCIAL

## 2019-09-12 NOTE — PROCEDURES
"HOME SLEEP STUDY INTERPRETATION    Patient: Nazia Jasso  MRN: [id number]  YOB: 1970  Study Date: 9/11/2019  Referring Provider: Sarah Gregorio  Ordering Provider: ERIC Hickey     Indications for Home Study: Nazia Jasso is a 49 year old female with a history of obstructive sleep apnea who presents for evaluation of effectiveness of mandibular advancement device .    Estimated body mass index is 36.56 kg/m  as calculated from the following:    Height as of 8/28/19: 1.74 m (5' 8.5\").    Weight as of 8/28/19: 110.7 kg (244 lb).  Total score - [place]: 3 (8/28/2019  3:00 PM)      Data: A full night home sleep study was performed recording the standard physiologic parameters including body position, movement, sound, nasal pressure, thermal oral airflow, chest and abdominal movements with respiratory inductance plethysmography, and oxygen saturation by pulse oximetry. Pulse rate was estimated by oximetry recording. This study was considered adequate based on > 4 hours of quality oximetry and respiratory recording. As specified by the AASM Manual for the Scoring of Sleep and Associated events, version 2.3, Rule VIII.D 1B, 4% oxygen desaturation scoring for hypopneas is used as a standard of care on all home sleep apnea testing.    Analysis Time:  506 minutes    Respiration:   Sleep Associated Hypoxemia: sustained hypoxemia was not present. Baseline oxygen saturation was 94%.  Time with saturation less than or equal to 88% was 0 minutes. The lowest oxygen saturation was 90%.   Snoring: Snoring was present.  Respiratory events: The home study revealed a presence of 1 obstructive apneas and 16 mixed and central apneas. There were 14 hypopneas resulting in a combined apnea/hypopnea index [AHI] of 3.7 events per hour.  AHI was 3.2 per hour supine, n/a per hour prone, 4.0 per hour on left side, and 3.4 per hour on right side.   Pattern: Excluding events noted above, " respiratory rate and pattern was Normal.    Position: Percent of time spent: supine - 40%, prone - 0%, on left - 23%, on right - 35%.    Heart Rate: By pulse oximetry normal rate was noted.     Assessment:   Ostructive sleep apnea appears well-mamaged with mandibular advancement device. There is residual snoring and airflow limitations    Recommendations:  Suggest optimizing sleep hygiene and avoiding sleep deprivation.  Weight management.    Diagnosis Code(s): Obstructive Sleep Apnea G47.33    Jaiden An MD, September 12, 2019   Diplomate, American Board of Internal Medicine, Sleep Medicine

## 2019-09-12 NOTE — PROGRESS NOTES
Pt returned HST device. It was downloaded and forwarded data to the clinical specialist for scoring.    Carmina Meléndez MA on 9/12/2019 at 12:48 PM

## 2019-09-12 NOTE — PROGRESS NOTES
This HSAT was performed using a Noxturnal T3 device which recorded snore, sound, movement activity, body position, nasal pressure, oronasal thermal airflow, pulse, oximetry and both chest and abdominal respiratory effort. HSAT data was restricted to the time patient states they were in bed.     HSAT was scored using 1B 4% hypopnea rule.     HST AHI (Non-PAT): 3.7  Snoring was reported as moderate.  Time with SpO2 below 89% was 0.5 minutes.   Overall signal quality was good     Pt will follow up with sleep provider to determine appropriate therapy.

## 2019-09-25 DIAGNOSIS — F33.0 MAJOR DEPRESSIVE DISORDER, RECURRENT EPISODE, MILD (H): ICD-10-CM

## 2019-09-25 RX ORDER — CITALOPRAM HYDROBROMIDE 20 MG/1
TABLET ORAL
Qty: 30 TABLET | Refills: 0 | Status: SHIPPED | OUTPATIENT
Start: 2019-09-25 | End: 2019-11-13

## 2019-09-25 NOTE — TELEPHONE ENCOUNTER
Prescription approved per [facility] Refill Protocol.  Bjorn Doss RN

## 2019-09-25 NOTE — TELEPHONE ENCOUNTER
"Requested Prescriptions   Pending Prescriptions Disp Refills     citalopram (CELEXA) 20 MG tablet [Pharmacy Med Name: CITALOPRAM HBR 20 MG TABLET]  Last Written Prescription Date:  8/29/2019  Last Fill Quantity: 30 tabs,  # refills: 0   Last office visit: 4/24/2019 with prescribing provider:  Darline   Future Office Visit:     30 tablet 0     Sig: TAKE 1 TABLET BY MOUTH EVERY DAY. NEEDS TO BE SEEN FOR REFILLS.       SSRIs Protocol Passed - 9/25/2019  9:35 AM        Passed - PHQ-9 score less than 5 in past 6 months     Please review last PHQ-9 score.           Passed - Medication is active on med list        Passed - Patient is age 18 or older        Passed - No active pregnancy on record        Passed - No positive pregnancy test in last 12 months        Passed - Recent (6 mo) or future (30 days) visit within the authorizing provider's specialty     Patient had office visit in the last 6 months or has a visit in the next 30 days with authorizing provider or within the authorizing provider's specialty.  See \"Patient Info\" tab in inbasket, or \"Choose Columns\" in Meds & Orders section of the refill encounter.              "

## 2019-10-02 ENCOUNTER — HEALTH MAINTENANCE LETTER (OUTPATIENT)
Age: 49
End: 2019-10-02

## 2019-10-25 ENCOUNTER — VIRTUAL VISIT (OUTPATIENT)
Dept: SLEEP MEDICINE | Facility: CLINIC | Age: 49
End: 2019-10-25
Payer: COMMERCIAL

## 2019-10-25 DIAGNOSIS — G47.33 OSA (OBSTRUCTIVE SLEEP APNEA): ICD-10-CM

## 2019-10-25 PROCEDURE — 98966 PH1 ASSMT&MGMT NQHP 5-10: CPT | Performed by: PHYSICIAN ASSISTANT

## 2019-10-25 NOTE — PROGRESS NOTES
"Nazia Jasso is a 49 year old female who is being evaluated via a billable telephone visit.      The patient has been notified of following:     \"This telephone visit will be conducted via a call between you and your physician/provider. We have found that certain health care needs can be provided without the need for a physical exam.  This service lets us provide the care you need with a short phone conversation.  If a prescription is necessary we can send it directly to your pharmacy.  If lab work is needed we can place an order for that and you can then stop by our lab to have the test done at a later time.    If during the course of the call the physician/provider feels a telephone visit is not appropriate, you will not be charged for this service.\"     Consent has been obtained for this service by 1 care team member: yes. See the scanned image in the medical record.    Nazia Jasso complains of    Chief Complaint   Patient presents with     Study Results       I have reviewed and updated the patient's Past Medical History, Social History, Family History and Medication List.    ALLERGIES  Septra ds [sulfamethoxazole w-trimethoprim]; Amoxicillin; Erythromycin; and Penicillins    ERIC Corey on 10/25/2019 at 6:56 AM       Sleep study summaries:  3/26/2019 [facility] Sleep Study (238.0 lbs) - AHI 18.0, RDI 26.4, Supine AHI 69.1, REM AHI -, Low O2 82.4%, Time Spent =88% 2.4 minutes / Time Spent =89% 5.0 minutes.    HST with mandibular advancement device in place completed on 9/11/2019(244#)-AHI 3.7, supine AHI 3.2, lowest oxygen saturation was ~90%.    Assessment/Plan:  Ostructive sleep apnea appears well-mamaged with mandibular advancement device. There is residual snoring and airflow limitations    Patient was referred back to her Sleep Dentist for the residual snoring and airflow limitations.  Follow up as needed.     I have reviewed the note as documented above.  This " accurately captures the substance of my conversation with the patient.    Total time of call between patient and provider was 5 minutes     Bharat Huffman PA-C

## 2019-11-04 ENCOUNTER — OFFICE VISIT (OUTPATIENT)
Dept: FAMILY MEDICINE | Facility: CLINIC | Age: 49
End: 2019-11-04
Payer: COMMERCIAL

## 2019-11-04 ENCOUNTER — ANCILLARY PROCEDURE (OUTPATIENT)
Dept: GENERAL RADIOLOGY | Facility: CLINIC | Age: 49
End: 2019-11-04
Attending: FAMILY MEDICINE
Payer: COMMERCIAL

## 2019-11-04 VITALS
DIASTOLIC BLOOD PRESSURE: 76 MMHG | WEIGHT: 244.4 LBS | HEART RATE: 73 BPM | RESPIRATION RATE: 16 BRPM | BODY MASS INDEX: 36.62 KG/M2 | TEMPERATURE: 97 F | OXYGEN SATURATION: 96 % | SYSTOLIC BLOOD PRESSURE: 120 MMHG

## 2019-11-04 DIAGNOSIS — M25.512 LEFT SHOULDER PAIN, UNSPECIFIED CHRONICITY: Primary | ICD-10-CM

## 2019-11-04 DIAGNOSIS — E66.01 MORBID OBESITY (H): ICD-10-CM

## 2019-11-04 PROCEDURE — 99213 OFFICE O/P EST LOW 20 MIN: CPT | Performed by: FAMILY MEDICINE

## 2019-11-04 PROCEDURE — 73030 X-RAY EXAM OF SHOULDER: CPT | Mod: LT

## 2019-11-04 RX ORDER — IBUPROFEN 600 MG/1
600 TABLET, FILM COATED ORAL EVERY 8 HOURS PRN
COMMUNITY
Start: 2019-11-04 | End: 2022-03-23

## 2019-11-04 NOTE — PATIENT INSTRUCTIONS
Patient Education     Shoulder Impingement Syndrome  The rotator cuff is a group of muscles and tendons that surround the shoulder joint. These muscles and tendons hold the arm in its joint. They help the shoulder move. The rotator cuff muscles and tendons can become irritated from repeated rubbing against the shoulder bone. This is called shoulder impingement syndrome or rotator cuff tendonitis.     If your case is mild, you may only need to rest the shoulder and then do certain exercises to strengthen the muscles. You can also take anti-inflammatory medicines. Steroid injections into the shoulder can ease inflammation. But you can have only a limited number of these. If the condition gets worse, your shoulder muscles may become thin and weak. This can lead to a rotator cuff tear.  Symptoms of shoulder impingement syndrome may include:    Shoulder pain that gets worse when you raise your arm overhead    Weakness of the shoulder muscles when you use your arm overhead    Popping and clicking when you move your shoulder    Shoulder pain that wakes you up at night, especially when you sleep on the affected shoulder    Sudden pain in your shoulder when you lift or reach  Home care  Follow these tips to take care of yourself at home:    Avoid activities that make your pain worse. These include raising your arms overhead, repeating the same motion over and over, or lifting heavy objects.    Don t hold your arm in one position for a long time. Keep it moving.    Put an ice pack on the sore area for 20 minutes every 1 to 2 hours for the first day. You can make an ice pack by putting ice cubes in a plastic bag. Wrap the bag in a towel before putting it on your shoulder. A frozen bag of peas or something similar can also be used as an ice pack. Use the ice packs 3 to 4 times a day for the next 2 days. Continue using the ice to relieve of pain and swelling as needed.    You may take acetaminophen or ibuprofen to control  pain, unless another medicine was prescribed. If prednisone was prescribed, don t take anti-inflammatory medicines. If you have chronic liver or kidney disease or ever had a stomach ulcer or gastrointestinal bleeding, talk with your doctor before using these medicines.    After your symptoms ease, you may get physical therapy or start a home exercise program. This can strengthen your shoulder muscles and help your range of motion. Talk with your doctor about what is best for your condition.  Follow-up care  Follow up with your healthcare provider, or as advised.  When to seek medical advice  Call your healthcare provider right away if any of these occur:    Shoulder pain that gets worse and wakes you up at night    Your shoulder or arm swells    Numbness, tingling, or pain that travels down the arm to the hand    Loss of shoulder strength    Fever or chills  Date Last Reviewed: 8/1/2016 2000-2018 [employer]. [address] [place] [address], [place] [address]. All rights reserved. This information is not intended as a substitute for professional medical care. Always follow your healthcare professional's instructions.

## 2019-11-04 NOTE — PROGRESS NOTES
Subjective     Nazia Jasso is a 49 year old female who presents to clinic today for the following health issues:    HPI   Joint Pain    Onset: x1-2 months    Description:   Location: left shoulder  Character: Dull ache , will hear cracking     Intensity: 4-5/10 constant 7/10 with lifting of arm     Progression of Symptoms: improved     Accompanying Signs & Symptoms:  Other symptoms: numbness and tingling at night when sleeping on left side    History:   Previous similar pain: no       Precipitating factors:   Trauma or overuse: no     Alleviating factors:  Improved by: nothing    Therapies Tried and outcome: heating pad, ice       Right hand dominant.     Patient Active Problem List   Diagnosis     CARDIOVASCULAR SCREENING; LDL GOAL LESS THAN 160     Mild major depression (H)     Family history of thyroid problem     ASCUS of cervix with negative high risk HPV     Class 2 obesity due to excess calories with body mass index (BMI) of 36.0 to 36.9 in adult, unspecified whether serious comorbidity present     KIYA (obstructive sleep apnea)- moderate (AHI 18)     Obesity (BMI 35.0-39.9) with comorbidity (H)     No past surgical history on file.    Social History     Tobacco Use     Smoking status: Never Smoker     Smokeless tobacco: Never Used   Substance Use Topics     Alcohol use: Yes     Comment: 1-2 drink per month     Family History   Problem Relation Age of Onset     C.A.D. Paternal Grandfather      Cerebrovascular Disease Paternal Grandfather      C.A.D. Maternal Grandfather      Hypertension Mother      Hypertension Maternal Grandmother      Prostate Cancer Father      No Known Problems Sister      Hypothyroidism Sister      No Known Problems Brother      Diabetes No family hx of      Breast Cancer No family hx of      Cancer - colorectal No family hx of          Current Outpatient Medications   Medication Sig Dispense Refill     Cholecalciferol (VITAMIN D-3) 5000 units TABS        citalopram  (CELEXA) 20 MG tablet TAKE 1 TABLET BY MOUTH EVERY DAY. NEEDS TO BE SEEN FOR REFILLS. 30 tablet 0     Allergies   Allergen Reactions     Septra Ds [Sulfamethoxazole W-Trimethoprim] Hives     Happened at end of course from 12/01/2011-12/     Amoxicillin      Erythromycin      hives     Penicillins      hives         Reviewed and updated as needed this visit by Provider         Review of Systems   ROS COMP: Constitutional, HEENT, cardiovascular, pulmonary, gi and gu systems are negative, except as otherwise noted.      Objective    /76   Pulse 73   Temp 97  F (36.1  C) (Tympanic)   Resp 16   Wt 110.9 kg (244 lb 6.4 oz)   SpO2 96%   Breastfeeding? No   BMI 36.62 kg/m    Body mass index is 36.62 kg/m .  Physical Exam   GENERAL: healthy, alert and no distress  LEFT SHOULDER EXAM: No atrophy or asymmetry. Tenderness over the deltoid. Painful  circumflex arc with discomfort on abduction. Positive impingement. Unable to do the lift off test.       Diagnostic Test Results:  Xray - in process        Assessment & Plan     Nazia was seen today for shoulder pain.    Diagnoses and all orders for this visit:    Left shoulder pain, suspect rotator cuff pathology  -     XR Shoulder Left G/E 3 Views  -     PHYSICAL THERAPY REFERRAL; Future  -     ibuprofen (ADVIL/MOTRIN) 600 MG tablet; Take 1 tablet (600 mg) by mouth every 8 hours as needed for moderate pain  -    Consider a Shoulder MRI if X rays are non-informative and no improvement with Physical Therapy.     Morbid obesity (H)  Weight management plan: Patient was referred to their PCP to discuss a diet and exercise plan.        Return in about 1 month (around 12/4/2019), or if symptoms worsen or fail to improve.    Tanisha Figueroa MD  [facility]

## 2019-11-07 DIAGNOSIS — M25.512 LEFT SHOULDER PAIN, UNSPECIFIED CHRONICITY: Primary | ICD-10-CM

## 2019-11-13 DIAGNOSIS — F33.0 MAJOR DEPRESSIVE DISORDER, RECURRENT EPISODE, MILD (H): ICD-10-CM

## 2019-11-13 NOTE — TELEPHONE ENCOUNTER
"Requested Prescriptions   Pending Prescriptions Disp Refills     citalopram (CELEXA) 20 MG tablet [Pharmacy Med Name: CITALOPRAM HBR 20 MG TABLET]  Last Written Prescription Date:  9/25/2019  Last Fill Quantity: 30 tablet,  # refills: 0   Last office visit: 10/24/2017 with prescribing provider:  RENATA Gregorio   Future Office Visit:   Next 5 appointments (look out 90 days)    Jan 21, 2020 10:00 AM CST  PHYS[facility] with [facility] [address] [place] [id number] [phone]        30 tablet 0     Sig: TAKE 1 TABLET BY MOUTH EVERY DAY. NEEDS TO BE SEEN FOR REFILLS.       SSRIs Protocol Passed - 11/13/2019  5:22 PM        Passed - PHQ-9 score less than 5 in past 6 months     Please review last PHQ-9 score.   PHQ-9 SCORE 10/24/2017 1/11/2019 8/29/2019   PHQ-9 Total Score - - -   PHQ-9 Total Score 1 3 1     No flowsheet data found.              Passed - Medication is active on med list        Passed - Patient is age 18 or older        Passed - No active pregnancy on record        Passed - No positive pregnancy test in last 12 months        Passed - Recent (6 mo) or future (30 days) visit within the authorizing provider's specialty     Patient had office visit in the last 6 months or has a visit in the next 30 days with authorizing provider or within the authorizing provider's specialty.  See \"Patient Info\" tab in inbasket, or \"Choose Columns\" in Meds & Orders section of the refill encounter.            "

## 2019-11-14 ENCOUNTER — TELEPHONE (OUTPATIENT)
Dept: FAMILY MEDICINE | Facility: CLINIC | Age: 49
End: 2019-11-14

## 2019-11-14 RX ORDER — CITALOPRAM HYDROBROMIDE 20 MG/1
TABLET ORAL
Qty: 90 TABLET | Refills: 0 | Status: SHIPPED | OUTPATIENT
Start: 2019-11-14 | End: 2020-01-21

## 2019-11-14 NOTE — TELEPHONE ENCOUNTER
.Reason for Call:  Other call back    Detailed comments: Patient has PT scheduled for 11/15/19 and an MRI scheduled for 11/16/19, she was told she should try PT before getting an MRI. Would like a call to clarify if she is supposed to do ongoing PT before scheduling an MRI.    Phone Number Patient can be reached at: Home number on file [phone] (home)    Best Time: ASAP    Can we leave a detailed message on this number? YES    Call taken on 11/14/2019 at 4:35 PM by Nanda Garvey

## 2019-11-14 NOTE — TELEPHONE ENCOUNTER
Routing refill request to provider for review/approval because:  Drug interaction warning          Aliya Montes RN, BSN, PHN  [facility] [place]

## 2019-11-15 ENCOUNTER — THERAPY VISIT (OUTPATIENT)
Dept: PHYSICAL THERAPY | Facility: CLINIC | Age: 49
End: 2019-11-15
Attending: FAMILY MEDICINE
Payer: COMMERCIAL

## 2019-11-15 DIAGNOSIS — G89.29 CHRONIC LEFT SHOULDER PAIN: ICD-10-CM

## 2019-11-15 DIAGNOSIS — M25.512 CHRONIC LEFT SHOULDER PAIN: ICD-10-CM

## 2019-11-15 DIAGNOSIS — M25.512 LEFT SHOULDER PAIN, UNSPECIFIED CHRONICITY: ICD-10-CM

## 2019-11-15 DIAGNOSIS — M75.42 IMPINGEMENT SYNDROME, SHOULDER, LEFT: ICD-10-CM

## 2019-11-15 PROCEDURE — 97110 THERAPEUTIC EXERCISES: CPT | Mod: GP | Performed by: PHYSICAL THERAPIST

## 2019-11-15 PROCEDURE — 97161 PT EVAL LOW COMPLEX 20 MIN: CPT | Mod: GP | Performed by: PHYSICAL THERAPIST

## 2019-11-15 NOTE — PROGRESS NOTES
Physical Therapy Initial Evaluation   L Shoulder  Precautions/Restrictions/MD instructions: E&T   Therapist Impression:   Pt is a 48 y/o female, with a chronic history of L shoulder pain for 2 months. Pt presents with functional impairments consistent with L shoulder derangement/impingement. These impairments limit their ability to complete ADLs without pain. Skilled PT services necessary in order to reduce impairments and improve independent function.    Subjective:   Chief Complaint: L Shoulder pain (insidous onset)/ feeling of instability  DOI/onset: 2 months (Orders: 11/04/2019)  Location: AC -> Deltoid/Triceps Quality: Shooting (reaching), dull (constant)  Frequency: Constant (dull ache), increase with reaching  Pain scale: Current: 5/10 Best: /10 Worst: 8/10   Time of day: End of work Sleeping: Wake change - able to return to sleep   Exacerbated by: Reaching Relieved by: Ice, Ibuprofen - mildly helpful Progression: Worsening  Previous Treatment: Chiro - consult Effect of prior treatment: N/A   PMH and/or surgical history: Depression, overweight, thyroid problems, scoliosis    Imaging: MRI (11/16/2019), Xray - no bony abnormality    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 Lateral shoulder   ER C7 T2 4 4     Palpation: No TTT    Special tests:   L   Impingement    Neer's +   Hawkin's-Jan +   Labral    Anterior Slide +   Mower's -   Crank -   Drop Arm -   Belly Press -   Lift off  -     GH/Capsular Mobility  L  R   Posterior glide Hyper Hyper    Inferior glide Hyper Hyper   Anterior glide Hyper Hyper     Assessment/Plan:    Patient is a 49 year old female with left side shoulder complaints.    Patient has the following significant findings with corresponding treatment plan.                Diagnosis 1:  L Shoulder Pain  Pain -  hot/cold therapy, US, electric stimulation, mechanical traction, manual therapy, STS, splint/taping/bracing/orthotics, self management, education, directional preference exercise and home program  Decreased ROM/flexibility - manual therapy, therapeutic exercise, therapeutic activity and home program  Decreased strength - therapeutic exercise, therapeutic activities and home program  Impaired muscle performance - electric stimulation, neuro re-education and home program  Decreased function - therapeutic activities, home program and functional performance testing    Therapy Evaluation Codes:   1) History comprised of:   Personal factors that impact the plan of care:      None.    Comorbidity factors that impact the plan of care are:      None, Depression and Overweight.     Medications impacting care: Anti-depressant.  2) Examination of Body Systems comprised of:   Body structures and functions that impact the plan of care:      Shoulder.   Activity limitations that impact the plan of care are:      Bathing, Cooking, Driving, Dressing, Lifting, Working and Sleeping.  3) Clinical presentation characteristics are:   Stable/Uncomplicated.  4) Decision-Making    Low complexity using standardized patient assessment instrument and/or measureable assessment of functional outcome.  Cumulative Therapy Evaluation is: Low complexity.    Previous and current functional limitations:  (See Goal Flow Sheet for this information)    Short term and Long term goals: (See Goal Flow Sheet for this information)     Communication ability:  Patient appears to be able to clearly communicate and understand verbal and written communication and follow directions  correctly.  Treatment Explanation - The following has been discussed with the patient:   RX ordered/plan of care  Anticipated outcomes  Possible risks and side effects  This patient would benefit from PT intervention to resume normal activities.   Rehab potential is good.    Frequency:  1 X week, once daily  Duration:  for 8 weeks  Discharge Plan:  Achieve all LTG.  Independent in home treatment program.  Reach maximal therapeutic benefit.    Please refer to the daily flowsheet for treatment today, total treatment time and time spent performing 1:1 timed codes.

## 2019-11-16 ENCOUNTER — ANCILLARY PROCEDURE (OUTPATIENT)
Dept: MRI IMAGING | Facility: CLINIC | Age: 49
End: 2019-11-16
Attending: FAMILY MEDICINE
Payer: COMMERCIAL

## 2019-11-16 DIAGNOSIS — M25.512 LEFT SHOULDER PAIN, UNSPECIFIED CHRONICITY: ICD-10-CM

## 2019-11-16 PROCEDURE — 73221 MRI JOINT UPR EXTREM W/O DYE: CPT | Mod: TC

## 2019-11-27 ENCOUNTER — THERAPY VISIT (OUTPATIENT)
Dept: PHYSICAL THERAPY | Facility: CLINIC | Age: 49
End: 2019-11-27
Payer: COMMERCIAL

## 2019-11-27 DIAGNOSIS — M25.512 CHRONIC LEFT SHOULDER PAIN: ICD-10-CM

## 2019-11-27 DIAGNOSIS — G89.29 CHRONIC LEFT SHOULDER PAIN: ICD-10-CM

## 2019-11-27 DIAGNOSIS — M75.42 IMPINGEMENT SYNDROME, SHOULDER, LEFT: ICD-10-CM

## 2019-11-27 PROCEDURE — 97110 THERAPEUTIC EXERCISES: CPT | Mod: GP | Performed by: PHYSICAL THERAPIST

## 2019-12-10 ENCOUNTER — THERAPY VISIT (OUTPATIENT)
Dept: PHYSICAL THERAPY | Facility: CLINIC | Age: 49
End: 2019-12-10
Payer: COMMERCIAL

## 2019-12-10 DIAGNOSIS — M25.512 CHRONIC LEFT SHOULDER PAIN: ICD-10-CM

## 2019-12-10 DIAGNOSIS — M75.42 IMPINGEMENT SYNDROME, SHOULDER, LEFT: ICD-10-CM

## 2019-12-10 DIAGNOSIS — G89.29 CHRONIC LEFT SHOULDER PAIN: ICD-10-CM

## 2019-12-10 PROCEDURE — 97110 THERAPEUTIC EXERCISES: CPT | Mod: GP | Performed by: PHYSICAL THERAPIST

## 2019-12-30 ENCOUNTER — THERAPY VISIT (OUTPATIENT)
Dept: PHYSICAL THERAPY | Facility: CLINIC | Age: 49
End: 2019-12-30
Payer: COMMERCIAL

## 2019-12-30 DIAGNOSIS — G89.29 CHRONIC LEFT SHOULDER PAIN: ICD-10-CM

## 2019-12-30 DIAGNOSIS — M25.512 CHRONIC LEFT SHOULDER PAIN: ICD-10-CM

## 2019-12-30 DIAGNOSIS — M75.42 IMPINGEMENT SYNDROME, SHOULDER, LEFT: ICD-10-CM

## 2019-12-30 PROCEDURE — 97110 THERAPEUTIC EXERCISES: CPT | Mod: GP | Performed by: PHYSICAL THERAPIST

## 2020-01-14 ENCOUNTER — THERAPY VISIT (OUTPATIENT)
Dept: PHYSICAL THERAPY | Facility: CLINIC | Age: 50
End: 2020-01-14
Payer: COMMERCIAL

## 2020-01-14 DIAGNOSIS — M75.42 IMPINGEMENT SYNDROME, SHOULDER, LEFT: ICD-10-CM

## 2020-01-14 DIAGNOSIS — M25.512 CHRONIC LEFT SHOULDER PAIN: ICD-10-CM

## 2020-01-14 DIAGNOSIS — G89.29 CHRONIC LEFT SHOULDER PAIN: ICD-10-CM

## 2020-01-14 PROCEDURE — 97110 THERAPEUTIC EXERCISES: CPT | Mod: GP | Performed by: PHYSICAL THERAPIST

## 2020-01-14 NOTE — PROGRESS NOTES
"DISCHARGE REPORT    Progress reporting period is from 11.15.19 to 1.14.2020.       SUBJECTIVE  Subjective changes noted by patient:  Pt reports shoulder is doing better overall.  Is able to reach overhead easier with less pain.  Will still get some pain when reaching to higher shelf but does not feel restricted with daily activities.     .      .   Changes in function:  Yes (See Goal flowsheet attached for changes in current functional level)  Adverse reaction to treatment or activity: None    OBJECTIVE  Objective: Sh AROM:  Flex 142; Abd 142; Flex/ER WNL; Ext/IR 1\" diff.  ROM improves after repeated neck ext and shoulder ext stretches.  Strength:  Lt Sh FF 5/5; Abd 5/5; ER 5/5; IR 5/5.      ASSESSMENT/PLAN  Updated problem list and treatment plan:   Diagnosis 1:  Lt Shoulder Pain    Pain -  self management, education, directional preference exercise and home program  Decreased ROM/flexibility - therapeutic exercise and home program  Decreased joint mobility - therapeutic exercise and home program  Decreased strength - therapeutic exercise and home program  Impaired muscle performance - home program  STG/LTGs have been met or progress has been made towards goals:  Yes (See Goal flow sheet completed today.)  Assessment of Progress: The patient's condition is improving.  The patient has met all of their long term goals.  Self Management Plans:  Patient is independent in a home treatment program.  Patient is independent in self management of symptoms.  I have re-evaluated this patient and find that the nature, scope, duration and intensity of the therapy is appropriate for the medical condition of the patient.  Nazia continues to require the following intervention to meet STG and LTG's:  PT intervention is no longer required to meet STG/LTG.    Recommendations:  This patient is ready to be discharged from therapy and continue their home treatment program.  "

## 2020-01-21 ENCOUNTER — OFFICE VISIT (OUTPATIENT)
Dept: FAMILY MEDICINE | Facility: CLINIC | Age: 50
End: 2020-01-21
Payer: COMMERCIAL

## 2020-01-21 VITALS
HEIGHT: 69 IN | BODY MASS INDEX: 35.55 KG/M2 | SYSTOLIC BLOOD PRESSURE: 120 MMHG | DIASTOLIC BLOOD PRESSURE: 75 MMHG | WEIGHT: 240 LBS | TEMPERATURE: 98.1 F | HEART RATE: 80 BPM

## 2020-01-21 DIAGNOSIS — Z00.00 ROUTINE GENERAL MEDICAL EXAMINATION AT A HEALTH CARE FACILITY: Primary | ICD-10-CM

## 2020-01-21 DIAGNOSIS — E66.01 MORBID OBESITY (H): ICD-10-CM

## 2020-01-21 DIAGNOSIS — F33.0 MAJOR DEPRESSIVE DISORDER, RECURRENT EPISODE, MILD (H): ICD-10-CM

## 2020-01-21 DIAGNOSIS — Z12.4 SCREENING FOR CERVICAL CANCER: ICD-10-CM

## 2020-01-21 PROBLEM — E66.09 CLASS 2 OBESITY DUE TO EXCESS CALORIES WITH BODY MASS INDEX (BMI) OF 36.0 TO 36.9 IN ADULT, UNSPECIFIED WHETHER SERIOUS COMORBIDITY PRESENT: Chronic | Status: RESOLVED | Noted: 2019-02-28 | Resolved: 2020-01-21

## 2020-01-21 PROBLEM — E66.812 CLASS 2 OBESITY DUE TO EXCESS CALORIES WITH BODY MASS INDEX (BMI) OF 36.0 TO 36.9 IN ADULT, UNSPECIFIED WHETHER SERIOUS COMORBIDITY PRESENT: Chronic | Status: RESOLVED | Noted: 2019-02-28 | Resolved: 2020-01-21

## 2020-01-21 PROCEDURE — G0145 SCR C/V CYTO,THINLAYER,RESCR: HCPCS | Performed by: FAMILY MEDICINE

## 2020-01-21 PROCEDURE — 99396 PREV VISIT EST AGE 40-64: CPT | Performed by: FAMILY MEDICINE

## 2020-01-21 PROCEDURE — 87624 HPV HI-RISK TYP POOLED RSLT: CPT | Performed by: FAMILY MEDICINE

## 2020-01-21 RX ORDER — CITALOPRAM HYDROBROMIDE 20 MG/1
TABLET ORAL
Qty: 90 TABLET | Refills: 3 | Status: SHIPPED | OUTPATIENT
Start: 2020-01-21 | End: 2021-01-27

## 2020-01-21 ASSESSMENT — ENCOUNTER SYMPTOMS
PALPITATIONS: 0
DIARRHEA: 0
CONSTIPATION: 0
FREQUENCY: 0
DIZZINESS: 0
DYSURIA: 0
ABDOMINAL PAIN: 0
NERVOUS/ANXIOUS: 0
HEARTBURN: 0
WEAKNESS: 0
BREAST MASS: 0
SHORTNESS OF BREATH: 0
MYALGIAS: 0
EYE PAIN: 0
FEVER: 0
SORE THROAT: 0
COUGH: 0
CHILLS: 0
HEADACHES: 0
JOINT SWELLING: 0
ARTHRALGIAS: 0
PARESTHESIAS: 0
NAUSEA: 0
HEMATOCHEZIA: 0

## 2020-01-21 ASSESSMENT — ANXIETY QUESTIONNAIRES
2. NOT BEING ABLE TO STOP OR CONTROL WORRYING: NOT AT ALL
5. BEING SO RESTLESS THAT IT IS HARD TO SIT STILL: NOT AT ALL
3. WORRYING TOO MUCH ABOUT DIFFERENT THINGS: NOT AT ALL
7. FEELING AFRAID AS IF SOMETHING AWFUL MIGHT HAPPEN: NOT AT ALL
6. BECOMING EASILY ANNOYED OR IRRITABLE: NOT AT ALL
1. FEELING NERVOUS, ANXIOUS, OR ON EDGE: SEVERAL DAYS
GAD7 TOTAL SCORE: 1
IF YOU CHECKED OFF ANY PROBLEMS ON THIS QUESTIONNAIRE, HOW DIFFICULT HAVE THESE PROBLEMS MADE IT FOR YOU TO DO YOUR WORK, TAKE CARE OF THINGS AT HOME, OR GET ALONG WITH OTHER PEOPLE: NOT DIFFICULT AT ALL

## 2020-01-21 ASSESSMENT — PATIENT HEALTH QUESTIONNAIRE - PHQ9
5. POOR APPETITE OR OVEREATING: NOT AT ALL
SUM OF ALL RESPONSES TO PHQ QUESTIONS 1-9: 1

## 2020-01-21 ASSESSMENT — MIFFLIN-ST. JEOR: SCORE: 1775.07

## 2020-01-21 NOTE — PROGRESS NOTES
SUBJECTIVE:   CC: Nazia Jasso is an 49 year old woman who presents for preventive health visit.     Healthy Habits:     Getting at least 3 servings of Calcium per day:  NO    Bi-annual eye exam:  Yes    Dental care twice a year:  Yes    Sleep apnea or symptoms of sleep apnea:  Sleep apnea    Diet:  Regular (no restrictions)    Frequency of exercise:  1 day/week    Duration of exercise:  30-45 minutes    Taking medications regularly:  Yes    Medication side effects:  None    PHQ-2 Total Score: 0    Additional concerns today:  No    Dental device has helped with sleeping more soundly.    Complete PT left rotator cuff feels better.        Depression Followup    How are you doing with your depression since your last visit? Stable     Are you having other symptoms that might be associated with depression? No    Have you had a significant life event?  No     Are you feeling anxious or having panic attacks?   No    Do you have any concerns with your use of alcohol or other drugs? No    Social History     Tobacco Use     Smoking status: Never Smoker     Smokeless tobacco: Never Used   Substance Use Topics     Alcohol use: Yes     Comment: 1-2 drink per month     Drug use: No     PHQ 10/24/2017 1/11/2019 8/29/2019   PHQ-9 Total Score 1 3 1   Q9: Thoughts of better off dead/self-harm past 2 weeks Not at all Not at all Not at all     No flowsheet data found.  Last PHQ-9 1/21/2020   1.  Little interest or pleasure in doing things 0   2.  Feeling down, depressed, or hopeless 0   3.  Trouble falling or staying asleep, or sleeping too much 0   4.  Feeling tired or having little energy 1   5.  Poor appetite or overeating 0   6.  Feeling bad about yourself 0   7.  Trouble concentrating 0   8.  Moving slowly or restless 0   Q9: Thoughts of better off dead/self-harm past 2 weeks 0   PHQ-9 Total Score 1   Difficulty at work, home, or with people Not difficult at all     ANDRES-7  1/21/2020   1. Feeling nervous, anxious,  or on edge 1   2. Not being able to stop or control worrying 0   3. Worrying too much about different things 0   4. Trouble relaxing 0   5. Being so restless that it is hard to sit still 0   6. Becoming easily annoyed or irritable 0   7. Feeling afraid, as if something awful might happen 0   ANDRES-7 Total Score 1   If you checked any problems, how difficult have they made it for you to do your work, take care of things at home, or get along with other people? Not difficult at all         Suicide Assessment Five-step Evaluation and Treatment (SAFE-T)      Today's PHQ-2 Score:   PHQ-2 ( 1999 [employer]) 1/21/2020   Q1: Little interest or pleasure in doing things 0   Q2: Feeling down, depressed or hopeless 0   PHQ-2 Score 0   Q1: Little interest or pleasure in doing things Not at all   Q2: Feeling down, depressed or hopeless Not at all   PHQ-2 Score 0       Abuse: Current or Past(Physical, Sexual or Emotional)- No  Do you feel safe in your environment? Yes        Social History     Tobacco Use     Smoking status: Never Smoker     Smokeless tobacco: Never Used   Substance Use Topics     Alcohol use: Yes     Comment: 1-2 drink per month     If you drink alcohol do you typically have >3 drinks per day or >7 drinks per week? No    Alcohol Use 1/21/2020   Prescreen: >3 drinks/day or >7 drinks/week? No   Prescreen: >3 drinks/day or >7 drinks/week? -   No flowsheet data found.    Reviewed orders with patient.  Reviewed health maintenance and updated orders accordingly - Yes          Pertinent mammograms are reviewed under the imaging tab.  History of abnormal Pap smear: NO - age 30- 65 PAP every 3 years recommended  PAP / HPV Latest Ref Rng & Units 6/17/2016   PAP - ASC-US(A)   HPV 16 DNA NEG Negative   HPV 18 DNA NEG Negative   OTHER HR HPV NEG Negative     Reviewed and updated as needed this visit by clinical staff  Tobacco  Allergies  Meds         Reviewed and updated as needed this visit by Provider  Allergies  Meds   "Problems  Med Hx  Surg Hx  Fam Hx            Review of Systems   Constitutional: Negative for chills and fever.   HENT: Negative for congestion, ear pain, hearing loss and sore throat.    Eyes: Negative for pain and visual disturbance.   Respiratory: Negative for cough and shortness of breath.    Cardiovascular: Negative for chest pain, palpitations and peripheral edema.   Gastrointestinal: Negative for abdominal pain, constipation, diarrhea, heartburn, hematochezia and nausea.   Breasts:  Negative for tenderness, breast mass and discharge.   Genitourinary: Negative for dysuria, frequency, genital sores, pelvic pain, urgency, vaginal bleeding and vaginal discharge.   Musculoskeletal: Negative for arthralgias, joint swelling and myalgias.   Skin: Negative for rash.   Neurological: Negative for dizziness, weakness, headaches and paresthesias.   Psychiatric/Behavioral: Negative for mood changes. The patient is not nervous/anxious.      Normal monthly menses     OBJECTIVE:   /75   Pulse 80   Temp 98.1  F (36.7  C) (Oral)   Ht 1.74 m (5' 8.5\")   Wt 108.9 kg (240 lb)   LMP 12/27/2019 (Approximate)   BMI 35.96 kg/m    Physical Exam  GENERAL: healthy, alert and no distress  EYES: Eyes grossly normal to inspection, PERRL and conjunctivae and sclerae normal  HENT: ear canals and TM's normal, nose and mouth without ulcers or lesions  NECK: no adenopathy, no asymmetry, masses, or scars and thyroid normal to palpation  RESP: lungs clear to auscultation - no rales, rhonchi or wheezes  BREAST: normal without masses, tenderness or nipple discharge and no palpable axillary masses or adenopathy  CV: regular rate and rhythm, normal S1 S2, no S3 or S4, no murmur, click or rub, no peripheral edema and peripheral pulses strong  ABDOMEN: soft, nontender, no hepatosplenomegaly, no masses and bowel sounds normal   (female): normal female external genitalia, normal urethral meatus, vaginal mucosa pink, moist, well rugated, " "and normal cervix/adnexa/uterus without masses or discharge  MS: no gross musculoskeletal defects noted, no edema  SKIN: no suspicious lesions or rashes  NEURO: Normal strength and tone, mentation intact and speech normal  PSYCH: mentation appears normal, affect normal/bright    Diagnostic Test Results:  Labs reviewed in Epic    ASSESSMENT/PLAN:   (Z00.00) Routine general medical examination at a health care facility  (primary encounter diagnosis)  Comment: healthy  Plan:  Other health care maintenance up to date per chart or patient report.      (F33.0) Major depressive disorder, recurrent episode, mild (H)  Comment: stable, no side effects  Plan: citalopram (CELEXA) 20 MG tablet        Continue current medication      (E66.01) obesity with complications (H)  Comment:   Plan: is working on getting back into a regular exercise routine    (Z12.4) Screening for cervical cancer  Comment:   Plan: Pap imaged thin layer screen with HPV -         recommended age 30 - 65 years (select HPV order        below), HPV High Risk Types DNA Cervical              COUNSELING:  Reviewed preventive health counseling, as reflected in patient instructions    Estimated body mass index is 35.96 kg/m  as calculated from the following:    Height as of this encounter: 1.74 m (5' 8.5\").    Weight as of this encounter: 108.9 kg (240 lb).    Weight management plan: Discussed healthy diet and exercise guidelines     reports that she has never smoked. She has never used smokeless tobacco.      Counseling Resources:  ATP IV Guidelines  Pooled Cohorts Equation Calculator  Breast Cancer Risk Calculator  FRAX Risk Assessment  ICSI Preventive Guidelines  Dietary Guidelines for Americans, 2010  USDA's MyPlate  ASA Prophylaxis  Lung CA Screening    Sarah Lopez MD  [facility]  "

## 2020-01-22 ASSESSMENT — ANXIETY QUESTIONNAIRES: GAD7 TOTAL SCORE: 1

## 2020-01-23 LAB
COPATH REPORT: NORMAL
PAP: NORMAL

## 2020-01-27 LAB
FINAL DIAGNOSIS: NORMAL
HPV HR 12 DNA CVX QL NAA+PROBE: NEGATIVE
HPV16 DNA SPEC QL NAA+PROBE: NEGATIVE
HPV18 DNA SPEC QL NAA+PROBE: NEGATIVE
SPECIMEN DESCRIPTION: NORMAL
SPECIMEN SOURCE CVX/VAG CYTO: NORMAL

## 2020-02-15 ENCOUNTER — NURSE TRIAGE (OUTPATIENT)
Dept: NURSING | Facility: CLINIC | Age: 50
End: 2020-02-15

## 2020-02-15 NOTE — TELEPHONE ENCOUNTER
"Patient complains of intermittent right lower abdominal pain she has had for past 1-2 days.  Describes pain as \"dull and achy\".  Pain is worse when lays on left side.  Denies fever.  Rates pain 4-6 out of 10.  Advised to be seen within 24 hours.  Patient will go to a [facility] in [place] on [address] .    Reason for Disposition    [1] MODERATE pain (e.g., interferes with normal activities) AND [2] pain comes and goes (cramps) AND [3] present > 24 hours  (Exception: pain with Vomiting or Diarrhea - see that Guideline)    Additional Information    Negative: Shock suspected (e.g., cold/pale/clammy skin, too weak to stand, low BP, rapid pulse)    Negative: Difficult to awaken or acting confused (e.g., disoriented, slurred speech)    Negative: Passed out (i.e., lost consciousness, collapsed and was not responding)    Negative: Sounds like a life-threatening emergency to the triager    Negative: Chest pain    Negative: Pain is mainly in upper abdomen  (if needed ask: \"is it mainly above the belly button?\")    Negative: Followed an abdomen (stomach) injury    Negative: [1] Abdominal pain AND [2] pregnant < 20 weeks    Negative: [1] Abdominal pain AND [2] pregnant > 20 weeks    Negative: [1] Abdominal pain AND [2] postpartum < 1 month since delivery    Negative: [1] SEVERE pain (e.g., excruciating) AND [2] present > 1 hour    Negative: [1] SEVERE pain AND [2] age > 60    Negative: [1] Vomiting AND [2] contains red blood or black (\"coffee ground\") material  (Exception: few red streaks in vomit that only happened once)    Negative: Blood in bowel movements   (Exception: blood on surface of BM with constipation)    Negative: Black or tarry bowel movements  (Exception: chronic-unchanged  black-grey bowel movements AND is taking iron pills or Pepto-bismol)    Negative: Patient sounds very sick or weak to the triager    Negative: [1] MILD-MODERATE pain AND [2] constant AND [3] present > 2 hours    Negative: [1] " Vomiting AND [2] abdomen looks much more swollen than usual    Negative: [1] Vomiting AND [2] contains bile (green color)    Negative: White of the eyes have turned yellow (i.e., jaundice)    Negative: Fever > 103 F (39.4 C)    Negative: [1] Fever > 101 F (38.3 C) AND [2] age > 60    Negative: [1] Fever > 100.0 F (37.8 C) AND [2] bedridden (e.g., nursing home patient, CVA, chronic illness, recovering from surgery)    Negative: [1] Fever > 100.0 F (37.8 C) AND [2] diabetes mellitus or weak immune system (e.g., HIV positive, cancer chemo, splenectomy, chronic steroids)    Negative: [1] SEVERE pain AND [2] present < 1 hour    Protocols used: ABDOMINAL PAIN - FEMALE-A-AH

## 2020-07-28 ENCOUNTER — ANCILLARY PROCEDURE (OUTPATIENT)
Dept: MAMMOGRAPHY | Facility: CLINIC | Age: 50
End: 2020-07-28
Attending: FAMILY MEDICINE
Payer: COMMERCIAL

## 2020-07-28 DIAGNOSIS — Z12.31 SCREENING MAMMOGRAM FOR HIGH-RISK PATIENT: ICD-10-CM

## 2020-07-28 PROCEDURE — 77063 BREAST TOMOSYNTHESIS BI: CPT | Mod: TC

## 2020-07-28 PROCEDURE — 77067 SCR MAMMO BI INCL CAD: CPT | Mod: TC

## 2021-01-15 ENCOUNTER — HEALTH MAINTENANCE LETTER (OUTPATIENT)
Age: 51
End: 2021-01-15

## 2021-01-19 ENCOUNTER — OFFICE VISIT (OUTPATIENT)
Dept: FAMILY MEDICINE | Facility: CLINIC | Age: 51
End: 2021-01-19
Payer: COMMERCIAL

## 2021-01-19 ENCOUNTER — NURSE TRIAGE (OUTPATIENT)
Dept: FAMILY MEDICINE | Facility: CLINIC | Age: 51
End: 2021-01-19

## 2021-01-19 VITALS
DIASTOLIC BLOOD PRESSURE: 78 MMHG | HEIGHT: 68 IN | TEMPERATURE: 97.9 F | WEIGHT: 254 LBS | OXYGEN SATURATION: 95 % | BODY MASS INDEX: 38.49 KG/M2 | SYSTOLIC BLOOD PRESSURE: 104 MMHG | HEART RATE: 74 BPM

## 2021-01-19 DIAGNOSIS — R07.89 CHEST PRESSURE: Primary | ICD-10-CM

## 2021-01-19 DIAGNOSIS — G47.33 OSA (OBSTRUCTIVE SLEEP APNEA): Chronic | ICD-10-CM

## 2021-01-19 DIAGNOSIS — E66.01 MORBID OBESITY (H): ICD-10-CM

## 2021-01-19 PROCEDURE — 93000 ELECTROCARDIOGRAM COMPLETE: CPT | Performed by: FAMILY MEDICINE

## 2021-01-19 PROCEDURE — 99213 OFFICE O/P EST LOW 20 MIN: CPT | Performed by: FAMILY MEDICINE

## 2021-01-19 ASSESSMENT — PATIENT HEALTH QUESTIONNAIRE - PHQ9: SUM OF ALL RESPONSES TO PHQ QUESTIONS 1-9: 2

## 2021-01-19 ASSESSMENT — MIFFLIN-ST. JEOR: SCORE: 1818.02

## 2021-01-19 ASSESSMENT — PAIN SCALES - GENERAL: PAINLEVEL: SEVERE PAIN (7)

## 2021-01-19 NOTE — TELEPHONE ENCOUNTER
See triage below.  5 minutes of chest pain this AM, no other symptoms, no symptoms now.    SEE TODAY IN OFFICE:   * You need to be examined today. Let me give you an appointment.  Scheduled with Dr. Fall at [facility] at 10:40 am, advised go to ER if symptoms worsen, recur, short of breath or dizzy with chest pain.    Patient verbalized understanding of and agreement with plan.      Susan Cool RN  [facility]      Additional Information    Negative: Severe difficulty breathing (e.g., struggling for each breath, speaks in single words)    Negative: Passed out (i.e., fainted, collapsed and was not responding)    Negative: Chest pain lasting longer than 5 minutes and ANY of the following:* Over 50 years old* Over 30 years old and at least one cardiac risk factor (i.e., high blood pressure, diabetes, high cholesterol, obesity, smoker or strong family history of heart disease)* Pain is crushing, pressure-like, or heavy * Took nitroglycerin and chest pain was not relieved* History of heart disease (i.e., angina, heart attack, bypass surgery, angioplasty, CHF)    Negative: Visible sweat on face or sweat dripping down face    Negative: Sounds like a life-threatening emergency to the triager    Negative: Followed an injury to chest    Negative: SEVERE chest pain    Negative: Pain also present in shoulder(s) or arm(s) or jaw    Negative: Difficulty breathing    Negative: Cocaine use within last 3 days    Negative: History of prior 'blood clot' in leg or lungs (i.e., deep vein thrombosis, pulmonary embolism)    Negative: Recent illness requiring prolonged bed rest (i.e., immobilization)    Negative: Hip or leg fracture in past 2 months (e.g, or had cast on leg or ankle)    Negative: Major surgery in the past month    Negative: Recent long-distance travel with prolonged time in car, bus, plane, or train (i.e., within past 2 weeks; 6 or more hours duration)    Negative: Heart beating irregularly or very  "rapidly    Negative: Chest pain lasting longer than 5 minutes    Negative: Intermittent chest pain and pain has been increasing in severity or frequency    Negative: Dizziness or lightheadedness    Negative: Coughing up blood    Negative: Patient sounds very sick or weak to the triager    All other patients with chest pain    Answer Assessment - Initial Assessment Questions  1. LOCATION: \"Where does it hurt?\"        Left chest  2. RADIATION: \"Does the pain go anywhere else?\" (e.g., into neck, jaw, arms, back)      no  3. ONSET: \"When did the chest pain begin?\" (Minutes, hours or days)       5 am, lasted about 5 minutes  4. PATTERN \"Does the pain come and go, or has it been constant since it started?\"  \"Does it get worse with exertion?\"       This was the first time having this pain in the last 6 months  5. DURATION: \"How long does it last\" (e.g., seconds, minutes, hours)      5 minutes  6. SEVERITY: \"How bad is the pain?\"  (e.g., Scale 1-10; mild, moderate, or severe)     - MILD (1-3): doesn't interfere with normal activities      - MODERATE (4-7): interferes with normal activities or awakens from sleep     - SEVERE (8-10): excruciating pain, unable to do any normal activities        6  7. CARDIAC RISK FACTORS: \"Do you have any history of heart problems or risk factors for heart disease?\" (e.g., prior heart attack, angina; high blood pressure, diabetes, being overweight, high cholesterol, smoking, or strong family history of heart disease)      Family history: dad had past MI identified on an EKG; is overweight; maternal grandfather  of MI  8. PULMONARY RISK FACTORS: \"Do you have any history of lung disease?\"  (e.g., blood clots in lung, asthma, emphysema, birth control pills)      Reports noticing being more short of breath when walking and talking, attributes this to gaining weight and less active during pandemic  9. CAUSE: \"What do you think is causing the chest pain?\"      Could be stress, is distance " "learning for job and has kids at home doing same  10. OTHER SYMPTOMS: \"Do you have any other symptoms?\" (e.g., dizziness, nausea, vomiting, sweating, fever, difficulty breathing, cough)        no  11. PREGNANCY: \"Is there any chance you are pregnant?\" \"When was your last menstrual period?\"        lmp just finished a few weeks ago    Protocols used: CHEST PAIN-A-[place]    "

## 2021-01-19 NOTE — PROGRESS NOTES
"  Assessment & Plan     Chest pressure  Normal EKG,  Patient has a low risk for coronary artery disease,  Discussed preventative measures such as weight loss, physically been more active.  Discussed red flag symptoms.  - EKG 12-lead complete w/read - Clinics    Obesity (BMI 35.0-39.9) with comorbidity (H)  Advised with diet modification, weight loss, daily exercise.    KIYA (obstructive sleep apnea)- moderate (AHI 18)  She does use mouthguard.  Not sure how that been effective.  She reports her snoring has been less, she is not waking up tired every morning.  She may benefit of sleep study.         BMI:   Estimated body mass index is 39.17 kg/m  as calculated from the following:    Height as of this encounter: 1.715 m (5' 7.52\").    Weight as of this encounter: 115.2 kg (254 lb).   Weight management plan: Discussed healthy diet and exercise guidelines      There are no Patient Instructions on file for this visit.    No follow-ups on file.    Marleni Fall MD  [facility]    Celine Mandujano is a 50 year old who presents to clinic today for the following health issues  Patient reports wake up this morning around 5:00 she felt chest, sharp pain in her chest mid chest, more toward the left side.  For about 5 minutes.  She had no other symptoms.  No nausea, did not feel dizzy, no vomiting.  Pain did not radiate to her upper neck, left arm, upper back.  She reports pain did subside and she felt better after 5-minute.    Patient physically active she does work, does not have chest pain short of breath does not feel dizzy when she is walking or active.    History of obstructive sleep apnea she does use a mouthguard.  Denies history of acid reflux.    Chest Pain  Onset/Duration: this morning  Description:   Location: left side  Character: tight and intense, and warm  Radiation: none  Duration: 5 minutes   Intensity: severe  Progression of Symptoms: improving  Accompanying Signs & " Symptoms:  Shortness of breath: YES- while talking  Sweating: no  Nausea/vomiting: no  Lightheadedness: no  Palpitations: YES- slight  Fever/Chills: no  Cough: no           Heartburn: YES- recently  History:   Family history of heart disease: YES- grandfather  Tobacco use: no  Previous similar symptoms: YES- six months, but less intense  Precipitating factors:   Worse with exertion: no  Worse with deep breaths: no           Related to eating: no           Better with burping: no  Alleviating factors: none  Therapies tried and outcome: none    Review of Systems   Constitutional, HEENT, cardiovascular, pulmonary, GI, , musculoskeletal, neuro, skin, endocrine and psych systems are negative, except as otherwise noted.      Objective    There were no vitals taken for this visit.  There is no height or weight on file to calculate BMI.  Physical Exam   GENERAL: healthy, alert and no distress  HENT: ear canals and TM's normal, nose and mouth without ulcers or lesions  NECK: no adenopathy, no asymmetry, masses, or scars and thyroid normal to palpation  RESP: lungs clear to auscultation - no rales, rhonchi or wheezes  CV: regular rate and rhythm, normal S1 S2, no S3 or S4, no murmur, click or rub, no peripheral edema and peripheral pulses strong  ABDOMEN: soft, nontender, no hepatosplenomegaly, no masses and bowel sounds normal  MS: no gross musculoskeletal defects noted, no edema  NEURO: Normal strength and tone, mentation intact and speech normal  PSYCH: mentation appears normal, affect normal/bright    EKG - Reviewed and interpreted by me appears normal, NSR, normal axis, normal intervals, no acute ST/T changes c/w ischemia    Marleni Fall MD

## 2021-01-25 ENCOUNTER — TELEPHONE (OUTPATIENT)
Dept: FAMILY MEDICINE | Facility: CLINIC | Age: 51
End: 2021-01-25

## 2021-01-25 DIAGNOSIS — F33.0 MAJOR DEPRESSIVE DISORDER, RECURRENT EPISODE, MILD (H): ICD-10-CM

## 2021-01-27 RX ORDER — CITALOPRAM HYDROBROMIDE 20 MG/1
TABLET ORAL
Qty: 90 TABLET | Refills: 0 | Status: SHIPPED | OUTPATIENT
Start: 2021-01-27 | End: 2021-02-16

## 2021-01-27 NOTE — TELEPHONE ENCOUNTER
Patient is due for an office visit.  She is due for a preventive visit - however given the pandemic, and the fact that she is not due for a Pap test - if she prefers, we could do a virtual visit to follow up on mood/medication at this time.  Otherwise, an office visit for preventive care can be scheduled.    One refill has been provided to allow time for scheduling.

## 2021-02-16 ENCOUNTER — VIRTUAL VISIT (OUTPATIENT)
Dept: FAMILY MEDICINE | Facility: CLINIC | Age: 51
End: 2021-02-16
Payer: COMMERCIAL

## 2021-02-16 DIAGNOSIS — R53.83 FATIGUE, UNSPECIFIED TYPE: Primary | ICD-10-CM

## 2021-02-16 DIAGNOSIS — F33.0 MAJOR DEPRESSIVE DISORDER, RECURRENT EPISODE, MILD (H): ICD-10-CM

## 2021-02-16 DIAGNOSIS — Z83.49 FAMILY HISTORY OF THYROID DISEASE: ICD-10-CM

## 2021-02-16 PROCEDURE — 99213 OFFICE O/P EST LOW 20 MIN: CPT | Mod: TEL | Performed by: FAMILY MEDICINE

## 2021-02-16 RX ORDER — CITALOPRAM HYDROBROMIDE 20 MG/1
TABLET ORAL
Qty: 90 TABLET | Refills: 3 | Status: SHIPPED | OUTPATIENT
Start: 2021-02-16 | End: 2022-03-21

## 2021-02-16 NOTE — PROGRESS NOTES
Nazia is a 50 year old who is being evaluated via a billable telephone visit.      What phone number would you like to be contacted at? [phone]  How would you like to obtain your AVS? Dominickhart    Assessment & Plan     Major depressive disorder, recurrent episode, mild (H)  Stable.  Desires to continue on medication  - citalopram (CELEXA) 20 MG tablet  Dispense: 90 tablet; Refill: 3    Fatigue, unspecified type  Discussed multifactorial causes.  Patient would like to see endocrinology - provided referral.  - ENDOCRINOLOGY ADULT REFERRAL    Family history of thyroid disease  Given family history and multiple previous symptoms - did discuss multifactorial causes for her symptoms.  Patient wishes to see endocrine and wishes to do lab work at the time of endocrine visit  - ENDOCRINOLOGY ADULT REFERRAL                   Return in about 1 year (around 2/16/2022) for Mood/Mental Health Visit.    Sarah Lopez MD  [facility]    Celine Mandujano is a 50 year old who presents for the following health issues     HPI     No further chest pressure since 1/19/21  Did not feel like heartburn at the time - as she has experienced that previously.    Exercise - hasn't been quite as active.  Cold weather has prevented walking.    Did get a flu vaccine this year.  Likely in November.    Wonders about her thyroid.  Given depression, anxiety, fatigue, cold intolerance, infertility.  Does have family history of thyroid disease.  Last thyroid lab work was in 1/19 and was normal.    Depression Followup    How are you doing with your depression since your last visit? No change    Are you having other symptoms that might be associated with depression? No    Have you had a significant life event?  No     Are you feeling anxious or having panic attacks?   No    Do you have any concerns with your use of alcohol or other drugs? No    Social History     Tobacco Use     Smoking status: Never Smoker      Smokeless tobacco: Never Used   Substance Use Topics     Alcohol use: Yes     Comment: 1-2 drink per month     Drug use: No     PHQ 8/29/2019 1/21/2020 1/19/2021   PHQ-9 Total Score 1 1 2   Q9: Thoughts of better off dead/self-harm past 2 weeks Not at all Not at all Not at all     ANDRES-7 SCORE 1/21/2020   Total Score 1     Last PHQ-9 1/19/2021   1.  Little interest or pleasure in doing things 0   2.  Feeling down, depressed, or hopeless 1   3.  Trouble falling or staying asleep, or sleeping too much 0   4.  Feeling tired or having little energy 1   5.  Poor appetite or overeating 0   6.  Feeling bad about yourself 0   7.  Trouble concentrating 0   8.  Moving slowly or restless 0   Q9: Thoughts of better off dead/self-harm past 2 weeks 0   PHQ-9 Total Score 2   Difficulty at work, home, or with people Somewhat difficult         Suicide Assessment Five-step Evaluation and Treatment (SAFE-T)      How many servings of fruits and vegetables do you eat daily?  2-3    On average, how many sweetened beverages do you drink each day (Examples: soda, juice, sweet tea, etc.  Do NOT count diet or artificially sweetened beverages)?   0    How many days per week do you exercise enough to make your heart beat faster? 3 or less    How many minutes a day do you exercise enough to make your heart beat faster? 9 or less  How many days per week do you miss taking your medication? occasional    What makes it hard for you to take your medications?  remembering to take        Review of Systems   Constitutional, HEENT, cardiovascular, pulmonary, gi and gu systems are negative, except as otherwise noted.      Objective           Vitals:  No vitals were obtained today due to virtual visit.    Physical Exam   healthy, alert and no distress  PSYCH: Alert and oriented times 3; coherent speech, normal   rate and volume, able to articulate logical thoughts, able   to abstract reason, no tangential thoughts, no hallucinations   or delusions  Her  affect is normal  RESP: No cough, no audible wheezing, able to talk in full sentences  Remainder of exam unable to be completed due to telephone visits                Phone call duration: 11 minutes  Start 2:55 PM  Stop 3:06PM

## 2021-02-26 ENCOUNTER — VIRTUAL VISIT (OUTPATIENT)
Dept: ENDOCRINOLOGY | Facility: CLINIC | Age: 51
End: 2021-02-26
Payer: COMMERCIAL

## 2021-02-26 DIAGNOSIS — R53.82 CHRONIC FATIGUE: Primary | ICD-10-CM

## 2021-02-26 PROCEDURE — 99204 OFFICE O/P NEW MOD 45 MIN: CPT | Mod: TEL | Performed by: INTERNAL MEDICINE

## 2021-02-26 NOTE — LETTER
2/26/2021         RE: Nazia Jasso  [address] [place] [address]        Dear Colleague,    Thank you for referring your patient, Nazia Jasso, to the [facility]. Please see a copy of my visit note below.    Nazia is a 50 year old who is being evaluated via a billable telephone visit.      What phone number would you like to be contacted at? [phone]  How would you like to obtain your AVS? MyChart    CC: Fatigue.     HPI:   Patient presents for evaluation of fatigue.   This is a chronic problem, ongoing for years, but has recently become worse.   She began taking medication for depression in 1997.   She feels that despite her initial improvement in depression, she continues to have symptoms of depression and fatigue and wonders if there is another explanation.     Sleeps 8 hours a night. She began wearing a dental appliance for KIYA. This initially improved her fatigue but now not feeling refreshed in the AM.     She has noticed occasional SOB when she is walking.   Attributes in part to weight gain. 13 pounds in the last year.     Increased thirst recently.   No significant change in urination.   Occasional cramping in her legs at night.   Comments on chronic soreness in hips and thighs.     Episode of chest pressure on 1/19/21. EKG was normal.     Chronic cold intolerance.     Menses have become less frequent. A few hot scattered hot flashes.     No recent medication changes.     ROS: 10 point ROS neg other than the symptoms noted above in the HPI.    PMH:   Patient Active Problem List   Diagnosis     CARDIOVASCULAR SCREENING; LDL GOAL LESS THAN 160     Mild major depression (H)     Family history of thyroid problem     ASCUS of cervix with negative high risk HPV     KIYA (obstructive sleep apnea)- moderate (AHI 18)     Obesity (BMI 35.0-39.9) with comorbidity (H)      Meds:  Current Outpatient Medications   Medication     Cholecalciferol  (VITAMIN D-3) 5000 units TABS     citalopram (CELEXA) 20 MG tablet     Multiple Vitamin (MULTIVITAMINS PO)     ibuprofen (ADVIL/MOTRIN) 600 MG tablet     No current facility-administered medications for this visit.       FHX:   Sister and several aunts have hypothyroidism.     SHX:  Non-smoker.   Rare alcohol.     Exam:   Gen: In NAD.     A/P:   Fatigue - Chronic with recent worsening. Possibly in part due to her depression. She is on citalopram. She does have KIYA and wears a dental appliance. However, she has gained weight and might need a CPAP. She has a history of vitamin D deficiency. She is currently on cholecalciferol but has not had a recent level checked.  Extensive discussion of thyroid hormone and normal physiology. Included was discussion of thyroid in relation to weight and energy.  -Iron panel, ferritin,vitamin D, B12, CBC, CMP, HbA1C, TSH, free T3, free T4, ESR, CRP.   -If above normal, I would recommend meeting with sleep medicine to see if she needs to change to CPAP. Reports she has a follow up visit scheduled.     Due to the COVID 19 pandemic this visit was a telephone/video visit in order to help prevent spread of infection in this high risk patient and the general population. The patient gave verbal consent for the visit today.    I have independently reviewed and interpreted labs, imaging as indicated.     Chart review/prep time 1  0825  Chart review/prep time 2 0830  Visit Start time 0830  Visit Stop time 0849  24 minutes spent on the date of the encounter doing chart review, history and exam, documentation and further activities as noted above.   If this were a face to face visit, it would be billed as [id number].    Fidencio Zambrano MD on 2/26/2021 at 8:49 AM        Again, thank you for allowing me to participate in the care of your patient.        Sincerely,        Fidencio Zambrano MD

## 2021-02-26 NOTE — PROGRESS NOTES
Nazia is a 50 year old who is being evaluated via a billable telephone visit.      What phone number would you like to be contacted at? [phone]  How would you like to obtain your AVS? Stu    CC: Fatigue.     HPI:   Patient presents for evaluation of fatigue.   This is a chronic problem, ongoing for years, but has recently become worse.   She began taking medication for depression in 1997.   She feels that despite her initial improvement in depression, she continues to have symptoms of depression and fatigue and wonders if there is another explanation.     Sleeps 8 hours a night. She began wearing a dental appliance for KIYA. This initially improved her fatigue but now not feeling refreshed in the AM.     She has noticed occasional SOB when she is walking.   Attributes in part to weight gain. 13 pounds in the last year.     Increased thirst recently.   No significant change in urination.   Occasional cramping in her legs at night.   Comments on chronic soreness in hips and thighs.     Episode of chest pressure on 1/19/21. EKG was normal.     Chronic cold intolerance.     Menses have become less frequent. A few hot scattered hot flashes.     No recent medication changes.     ROS: 10 point ROS neg other than the symptoms noted above in the HPI.    PMH:   Patient Active Problem List   Diagnosis     CARDIOVASCULAR SCREENING; LDL GOAL LESS THAN 160     Mild major depression (H)     Family history of thyroid problem     ASCUS of cervix with negative high risk HPV     KIYA (obstructive sleep apnea)- moderate (AHI 18)     Obesity (BMI 35.0-39.9) with comorbidity (H)      Meds:  Current Outpatient Medications   Medication     Cholecalciferol (VITAMIN D-3) 5000 units TABS     citalopram (CELEXA) 20 MG tablet     Multiple Vitamin (MULTIVITAMINS PO)     ibuprofen (ADVIL/MOTRIN) 600 MG tablet     No current facility-administered medications for this visit.       FHX:   Sister and several aunts have hypothyroidism.      SHX:  Non-smoker.   Rare alcohol.     Exam:   Gen: In NAD.     A/P:   Fatigue - Chronic with recent worsening. Possibly in part due to her depression. She is on citalopram. She does have KIYA and wears a dental appliance. However, she has gained weight and might need a CPAP. She has a history of vitamin D deficiency. She is currently on cholecalciferol but has not had a recent level checked.  Extensive discussion of thyroid hormone and normal physiology. Included was discussion of thyroid in relation to weight and energy.  -Iron panel, ferritin,vitamin D, B12, CBC, CMP, HbA1C, TSH, free T3, free T4, ESR, CRP.   -If above normal, I would recommend meeting with sleep medicine to see if she needs to change to CPAP. Reports she has a follow up visit scheduled.     Due to the COVID 19 pandemic this visit was a telephone/video visit in order to help prevent spread of infection in this high risk patient and the general population. The patient gave verbal consent for the visit today.    I have independently reviewed and interpreted labs, imaging as indicated.     Chart review/prep time 1  0825  Chart review/prep time 2 0830  Visit Start time 0830  Visit Stop time 0849  24 minutes spent on the date of the encounter doing chart review, history and exam, documentation and further activities as noted above.   If this were a face to face visit, it would be billed as [id number].    Fidencio Zambrano MD on 2/26/2021 at 8:49 AM

## 2021-03-09 DIAGNOSIS — R53.82 CHRONIC FATIGUE: ICD-10-CM

## 2021-03-09 LAB
BASOPHILS # BLD AUTO: 0 10E9/L (ref 0–0.2)
BASOPHILS NFR BLD AUTO: 0.4 %
CRP SERPL-MCNC: <2.9 MG/L (ref 0–8)
DIFFERENTIAL METHOD BLD: NORMAL
EOSINOPHIL # BLD AUTO: 0.1 10E9/L (ref 0–0.7)
EOSINOPHIL NFR BLD AUTO: 2.7 %
ERYTHROCYTE [DISTWIDTH] IN BLOOD BY AUTOMATED COUNT: 13.2 % (ref 10–15)
ERYTHROCYTE [SEDIMENTATION RATE] IN BLOOD BY WESTERGREN METHOD: 11 MM/H (ref 0–20)
HBA1C MFR BLD: 5 % (ref 0–5.6)
HCT VFR BLD AUTO: 41.3 % (ref 35–47)
HGB BLD-MCNC: 13.4 G/DL (ref 11.7–15.7)
LYMPHOCYTES # BLD AUTO: 1.4 10E9/L (ref 0.8–5.3)
LYMPHOCYTES NFR BLD AUTO: 26.8 %
MCH RBC QN AUTO: 29.5 PG (ref 26.5–33)
MCHC RBC AUTO-ENTMCNC: 32.4 G/DL (ref 31.5–36.5)
MCV RBC AUTO: 91 FL (ref 78–100)
MONOCYTES # BLD AUTO: 0.4 10E9/L (ref 0–1.3)
MONOCYTES NFR BLD AUTO: 7.2 %
NEUTROPHILS # BLD AUTO: 3.2 10E9/L (ref 1.6–8.3)
NEUTROPHILS NFR BLD AUTO: 62.9 %
PLATELET # BLD AUTO: 228 10E9/L (ref 150–450)
RBC # BLD AUTO: 4.55 10E12/L (ref 3.8–5.2)
T3FREE SERPL-MCNC: 2.2 PG/ML (ref 2.3–4.2)
VIT B12 SERPL-MCNC: 632 PG/ML (ref 193–986)
WBC # BLD AUTO: 5.1 10E9/L (ref 4–11)

## 2021-03-09 PROCEDURE — 84481 FREE ASSAY (FT-3): CPT | Performed by: INTERNAL MEDICINE

## 2021-03-09 PROCEDURE — 82607 VITAMIN B-12: CPT | Performed by: INTERNAL MEDICINE

## 2021-03-09 PROCEDURE — 84439 ASSAY OF FREE THYROXINE: CPT | Performed by: INTERNAL MEDICINE

## 2021-03-09 PROCEDURE — 82306 VITAMIN D 25 HYDROXY: CPT | Performed by: INTERNAL MEDICINE

## 2021-03-09 PROCEDURE — 83036 HEMOGLOBIN GLYCOSYLATED A1C: CPT | Performed by: INTERNAL MEDICINE

## 2021-03-09 PROCEDURE — 83550 IRON BINDING TEST: CPT | Performed by: INTERNAL MEDICINE

## 2021-03-09 PROCEDURE — 80050 GENERAL HEALTH PANEL: CPT | Performed by: INTERNAL MEDICINE

## 2021-03-09 PROCEDURE — 85652 RBC SED RATE AUTOMATED: CPT | Performed by: INTERNAL MEDICINE

## 2021-03-09 PROCEDURE — 86140 C-REACTIVE PROTEIN: CPT | Performed by: INTERNAL MEDICINE

## 2021-03-09 PROCEDURE — 36415 COLL VENOUS BLD VENIPUNCTURE: CPT | Performed by: INTERNAL MEDICINE

## 2021-03-09 PROCEDURE — 83540 ASSAY OF IRON: CPT | Performed by: INTERNAL MEDICINE

## 2021-03-09 PROCEDURE — 82728 ASSAY OF FERRITIN: CPT | Performed by: INTERNAL MEDICINE

## 2021-03-10 LAB
ALBUMIN SERPL-MCNC: 3.5 G/DL (ref 3.4–5)
ALP SERPL-CCNC: 97 U/L (ref 40–150)
ALT SERPL W P-5'-P-CCNC: 30 U/L (ref 0–50)
ANION GAP SERPL CALCULATED.3IONS-SCNC: 5 MMOL/L (ref 3–14)
AST SERPL W P-5'-P-CCNC: 21 U/L (ref 0–45)
BILIRUB SERPL-MCNC: 0.3 MG/DL (ref 0.2–1.3)
BUN SERPL-MCNC: 11 MG/DL (ref 7–30)
CALCIUM SERPL-MCNC: 9.3 MG/DL (ref 8.5–10.1)
CHLORIDE SERPL-SCNC: 107 MMOL/L (ref 94–109)
CO2 SERPL-SCNC: 26 MMOL/L (ref 20–32)
CREAT SERPL-MCNC: 0.63 MG/DL (ref 0.52–1.04)
FERRITIN SERPL-MCNC: 17 NG/ML (ref 8–252)
GFR SERPL CREATININE-BSD FRML MDRD: >90 ML/MIN/{1.73_M2}
GLUCOSE SERPL-MCNC: 110 MG/DL (ref 70–99)
IRON SATN MFR SERPL: 24 % (ref 15–46)
IRON SERPL-MCNC: 68 UG/DL (ref 35–180)
POTASSIUM SERPL-SCNC: 4 MMOL/L (ref 3.4–5.3)
PROT SERPL-MCNC: 7 G/DL (ref 6.8–8.8)
SODIUM SERPL-SCNC: 138 MMOL/L (ref 133–144)
T4 FREE SERPL-MCNC: 0.91 NG/DL (ref 0.76–1.46)
TIBC SERPL-MCNC: 288 UG/DL (ref 240–430)
TSH SERPL DL<=0.005 MIU/L-ACNC: 3.69 MU/L (ref 0.4–4)

## 2021-03-13 LAB
DEPRECATED CALCIDIOL+CALCIFEROL SERPL-MC: <44 UG/L (ref 20–75)
VITAMIN D2 SERPL-MCNC: <5 UG/L
VITAMIN D3 SERPL-MCNC: 39 UG/L

## 2021-03-15 DIAGNOSIS — R53.82 CHRONIC FATIGUE: Primary | ICD-10-CM

## 2021-03-16 DIAGNOSIS — R53.82 CHRONIC FATIGUE: ICD-10-CM

## 2021-03-16 PROCEDURE — 36415 COLL VENOUS BLD VENIPUNCTURE: CPT | Performed by: INTERNAL MEDICINE

## 2021-03-16 PROCEDURE — 84481 FREE ASSAY (FT-3): CPT | Performed by: INTERNAL MEDICINE

## 2021-03-17 LAB — T3FREE SERPL-MCNC: 2.1 PG/ML (ref 2.3–4.2)

## 2021-03-21 ENCOUNTER — HEALTH MAINTENANCE LETTER (OUTPATIENT)
Age: 51
End: 2021-03-21

## 2021-03-22 ENCOUNTER — TELEPHONE (OUTPATIENT)
Dept: ENDOCRINOLOGY | Facility: CLINIC | Age: 51
End: 2021-03-22

## 2021-03-22 DIAGNOSIS — Z83.49 FAMILY HISTORY OF THYROID PROBLEM: Primary | ICD-10-CM

## 2021-03-22 RX ORDER — LEVOTHYROXINE SODIUM 25 UG/1
25 TABLET ORAL DAILY
Qty: 30 TABLET | Refills: 11 | Status: SHIPPED | OUTPATIENT
Start: 2021-03-22 | End: 2021-04-28

## 2021-03-22 NOTE — TELEPHONE ENCOUNTER
Reason for Call:  Other prescription    Detailed comments: Patient  has questions about levothyroxine 25mcg. Please call back    Phone Number Patient can be reached at: Cell number on file:    Telephone Information:   Mobile [phone]       Best Time: any    Can we leave a detailed message on this number? YES    Call taken on 3/22/2021 at 10:03 AM by Meli Holland

## 2021-03-22 NOTE — TELEPHONE ENCOUNTER
Patient called stating she reviewed her lab result note but never received a prescription for levothyroxine. Result note states:    Again isolated low T3 level.   I am not sure how much if any clinical impact this is having.   Levothyroxine, T4, is converted by the body to T3 and is a more stable drug to use than T3 alone.   I would recommend 25 mcg levothyroxine daily.   TSH, free T4, and free T3 in 4 weeks and see me after.      Fidencio Zambrano MD on 3/17/2021 at 3:46 PM    RN sent in a prescription for 25 mcg daily per Dr. Zambrano's recommendation.     Hill GILLIS RN....3/22/2021 12:05 PM

## 2021-04-14 ENCOUNTER — DOCUMENTATION ONLY (OUTPATIENT)
Dept: LAB | Facility: CLINIC | Age: 51
End: 2021-04-14

## 2021-04-14 DIAGNOSIS — R94.6 ABNORMAL FINDING ON THYROID FUNCTION TEST: Primary | ICD-10-CM

## 2021-04-21 DIAGNOSIS — R94.6 ABNORMAL FINDING ON THYROID FUNCTION TEST: ICD-10-CM

## 2021-04-21 LAB
T3FREE SERPL-MCNC: 2.5 PG/ML (ref 2.3–4.2)
T4 FREE SERPL-MCNC: 1.05 NG/DL (ref 0.76–1.46)
TSH SERPL DL<=0.005 MIU/L-ACNC: 2.79 MU/L (ref 0.4–4)

## 2021-04-21 PROCEDURE — 36415 COLL VENOUS BLD VENIPUNCTURE: CPT | Performed by: INTERNAL MEDICINE

## 2021-04-21 PROCEDURE — 84439 ASSAY OF FREE THYROXINE: CPT | Performed by: INTERNAL MEDICINE

## 2021-04-21 PROCEDURE — 84481 FREE ASSAY (FT-3): CPT | Performed by: INTERNAL MEDICINE

## 2021-04-21 PROCEDURE — 84443 ASSAY THYROID STIM HORMONE: CPT | Performed by: INTERNAL MEDICINE

## 2021-04-28 ENCOUNTER — VIRTUAL VISIT (OUTPATIENT)
Dept: ENDOCRINOLOGY | Facility: CLINIC | Age: 51
End: 2021-04-28
Payer: COMMERCIAL

## 2021-04-28 DIAGNOSIS — Z83.49 FAMILY HISTORY OF THYROID PROBLEM: ICD-10-CM

## 2021-04-28 DIAGNOSIS — R94.6 ABNORMAL FINDING ON THYROID FUNCTION TEST: Primary | ICD-10-CM

## 2021-04-28 PROCEDURE — 99214 OFFICE O/P EST MOD 30 MIN: CPT | Mod: 95 | Performed by: INTERNAL MEDICINE

## 2021-04-28 RX ORDER — LEVOTHYROXINE SODIUM 50 UG/1
25 TABLET ORAL DAILY
Qty: 30 TABLET | Refills: 11 | Status: SHIPPED | OUTPATIENT
Start: 2021-04-28 | End: 2021-06-15 | Stop reason: DRUGHIGH

## 2021-04-28 NOTE — PROGRESS NOTES
Nazia is a 50 year old who is being evaluated via a billable telephone visit.      What phone number would you like to be contacted at? [phone]  How would you like to obtain your AVS? Peytonfrankie STAPLES:   Patient presents for evaluation of fatigue.   This is a chronic problem, ongoing for years, but has recently become worse.   She began taking medication for depression in 1997.   She feels that despite her initial improvement in depression, she continues to have symptoms of depression and fatigue and wonders if there is another explanation.     Sleeps 8 hours a night. She began wearing a dental appliance for KIYA. This initially improved her fatigue but now not feeling refreshed in the AM.     She has noticed occasional SOB when she is walking.   Attributes in part to weight gain. 13 pounds in the last year.     Increased thirst recently.   No significant change in urination.   Occasional cramping in her legs at night.   Comments on chronic soreness in hips and thighs.     Episode of chest pressure on 1/19/21. EKG was normal.     Chronic cold intolerance.     Menses have become less frequent. A few hot scattered hot flashes.     No recent medication changes.     In 4/2021, since starting cytomel, she has noticed a small improvement in her fatigue.   However, she has also been reducing her caffeine intake. No clear change in depression.       ROS: 10 point ROS neg other than the symptoms noted above in the HPI.    Exam:   Gen: In NAD.     A/P:   Fatigue - Chronic with recent worsening. Possibly in part due to her depression. She is on citalopram. She does have KIYA and wears a dental appliance. However, she has gained weight and might need a CPAP. She has a history of vitamin D deficiency. She is currently on cholecalciferol but has not had a recent level checked.  Extensive discussion of thyroid hormone and normal physiology. Included was discussion of thyroid in relation to weight and energy.  Lab workup in  3/2021 was unrevealing aside from low free T3 on two occasions.   Started levothyroxine  In 4/2021, free T3 normal. Small improvement in fatigue.   -Increase levothyroxine to 50 mcg every day.   -Labs in 4 weeks. If labs improve but still having afternoon fatigue, start cytomel at lunch time.     Due to the COVID 19 pandemic this visit was a telephone/video visit in order to help prevent spread of infection in this high risk patient and the general population. The patient gave verbal consent for the visit today.    I have independently reviewed and interpreted labs, imaging as indicated.     Visit Start time 1500  Visit Stop time 1515  15 minutes spent on the date of the encounter doing chart review, history and exam, documentation and further activities as noted above.   If this were a face to face visit, it would be billed as [id number].    Fidencio Zambrano MD on 4/28/2021 at 3:15 PM

## 2021-04-28 NOTE — LETTER
4/28/2021         RE: Nazia Jasso  [address] [place] [address]        Dear Colleague,    Thank you for referring your patient, Nazia Jasso, to the [facility]. Please see a copy of my visit note below.    Nazia is a 50 year old who is being evaluated via a billable telephone visit.      What phone number would you like to be contacted at? [phone]  How would you like to obtain your AVS? Stu    S:   Patient presents for evaluation of fatigue.   This is a chronic problem, ongoing for years, but has recently become worse.   She began taking medication for depression in 1997.   She feels that despite her initial improvement in depression, she continues to have symptoms of depression and fatigue and wonders if there is another explanation.     Sleeps 8 hours a night. She began wearing a dental appliance for KIYA. This initially improved her fatigue but now not feeling refreshed in the AM.     She has noticed occasional SOB when she is walking.   Attributes in part to weight gain. 13 pounds in the last year.     Increased thirst recently.   No significant change in urination.   Occasional cramping in her legs at night.   Comments on chronic soreness in hips and thighs.     Episode of chest pressure on 1/19/21. EKG was normal.     Chronic cold intolerance.     Menses have become less frequent. A few hot scattered hot flashes.     No recent medication changes.     In 4/2021, since starting cytomel, she has noticed a small improvement in her fatigue.   However, she has also been reducing her caffeine intake. No clear change in depression.       ROS: 10 point ROS neg other than the symptoms noted above in the HPI.    Exam:   Gen: In NAD.     A/P:   Fatigue - Chronic with recent worsening. Possibly in part due to her depression. She is on citalopram. She does have KIYA and wears a dental appliance. However, she has gained weight and might need a  CPAP. She has a history of vitamin D deficiency. She is currently on cholecalciferol but has not had a recent level checked.  Extensive discussion of thyroid hormone and normal physiology. Included was discussion of thyroid in relation to weight and energy.  Lab workup in 3/2021 was unrevealing aside from low free T3 on two occasions.   Started levothyroxine  In 4/2021, free T3 normal. Small improvement in fatigue.   -Increase levothyroxine to 50 mcg every day.   -Labs in 4 weeks. If labs improve but still having afternoon fatigue, start cytomel at lunch time.     Due to the COVID 19 pandemic this visit was a telephone/video visit in order to help prevent spread of infection in this high risk patient and the general population. The patient gave verbal consent for the visit today.    I have independently reviewed and interpreted labs, imaging as indicated.     Visit Start time 1500  Visit Stop time 1515  15 minutes spent on the date of the encounter doing chart review, history and exam, documentation and further activities as noted above.   If this were a face to face visit, it would be billed as [id number].    Fidencio Zambrano MD on 4/28/2021 at 3:15 PM            Again, thank you for allowing me to participate in the care of your patient.        Sincerely,        Fidencio Zambrano MD

## 2021-05-20 ENCOUNTER — TELEPHONE (OUTPATIENT)
Dept: ENDOCRINOLOGY | Facility: CLINIC | Age: 51
End: 2021-05-20

## 2021-05-20 NOTE — TELEPHONE ENCOUNTER
Pt returned call she is not in need of the ERX now. Labs set up for next week.    Nelda WISEMNA RN Specialty Triage 5/20/2021 1:00 PM

## 2021-05-20 NOTE — TELEPHONE ENCOUNTER
Called pt and left vm to return call or mychart. Pt is due for labs, may result in a change with levothyroxine dose.    Nelda WISEMAN RN Specialty Triage 5/20/2021 12:49 PM

## 2021-05-20 NOTE — TELEPHONE ENCOUNTER
Patient is asking for her levothyroxine to be a 90 day supply. Pharmacy is [facility] [id number] [facility] [id number].    Brooklynn Castro CMA Rheumatology  5/20/2021 12:36 PM

## 2021-05-28 DIAGNOSIS — R94.6 ABNORMAL FINDING ON THYROID FUNCTION TEST: ICD-10-CM

## 2021-05-28 LAB
T3FREE SERPL-MCNC: 2.5 PG/ML (ref 2.3–4.2)
T4 FREE SERPL-MCNC: 1.12 NG/DL (ref 0.76–1.46)
TSH SERPL DL<=0.005 MIU/L-ACNC: 2.42 MU/L (ref 0.4–4)

## 2021-05-28 PROCEDURE — 36415 COLL VENOUS BLD VENIPUNCTURE: CPT | Performed by: INTERNAL MEDICINE

## 2021-05-28 PROCEDURE — 84481 FREE ASSAY (FT-3): CPT | Performed by: INTERNAL MEDICINE

## 2021-05-28 PROCEDURE — 84443 ASSAY THYROID STIM HORMONE: CPT | Performed by: INTERNAL MEDICINE

## 2021-05-28 PROCEDURE — 84439 ASSAY OF FREE THYROXINE: CPT | Performed by: INTERNAL MEDICINE

## 2021-06-08 ENCOUNTER — MYC MEDICAL ADVICE (OUTPATIENT)
Dept: ENDOCRINOLOGY | Facility: CLINIC | Age: 51
End: 2021-06-08

## 2021-06-08 DIAGNOSIS — R94.6 ABNORMAL FINDING ON THYROID FUNCTION TEST: Primary | ICD-10-CM

## 2021-06-09 RX ORDER — LIOTHYRONINE SODIUM 5 UG/1
5 TABLET ORAL DAILY
Qty: 30 TABLET | Refills: 11 | Status: SHIPPED | OUTPATIENT
Start: 2021-06-09 | End: 2022-06-08

## 2021-06-09 NOTE — TELEPHONE ENCOUNTER
Please see patient my-chart questions below. I have the last office note from provider below to view if needed. Please advise    Started levothyroxine  In 4/2021, free T3 normal. Small improvement in fatigue.   -Increase levothyroxine to 50 mcg every day.   -Labs in 4 weeks. If labs improve but still having afternoon fatigue, start cytomel at lunch time.       Thanks,  Zahra Garcia MA

## 2021-06-11 ENCOUNTER — TELEPHONE (OUTPATIENT)
Dept: ENDOCRINOLOGY | Facility: CLINIC | Age: 51
End: 2021-06-11

## 2021-06-11 DIAGNOSIS — Z83.49 FAMILY HISTORY OF THYROID PROBLEM: ICD-10-CM

## 2021-06-11 NOTE — TELEPHONE ENCOUNTER
Reason for Call:  Medication or medication refill:    Do you use a [facility]?  Name of the pharmacy and phone number for the current request:  [facility]    Name of the medication requested: levothyroxine    Other request: Patient calling to clarify dose of rx.     Can we leave a detailed message on this number? YES    Phone number patient can be reached at: [phone]    Best Time: any    Call taken on 6/11/2021 at 3:58 PM by Sarah Washburn

## 2021-06-15 RX ORDER — LEVOTHYROXINE SODIUM 50 UG/1
50 TABLET ORAL DAILY
Qty: 30 TABLET | Refills: 11 | Status: SHIPPED | OUTPATIENT
Start: 2021-06-15 | End: 2022-07-05

## 2021-06-15 RX ORDER — LEVOTHYROXINE SODIUM 50 UG/1
50 TABLET ORAL DAILY
Qty: 30 TABLET | Refills: 11 | Status: CANCELLED | OUTPATIENT
Start: 2021-06-15

## 2021-06-15 NOTE — TELEPHONE ENCOUNTER
Pt called. Per result note, Pt to take levothyroxine 50 mcg and start on cytomel 5 mcg. Patient requesting up dated Rx for levothyroxine to pharmacy showing 50 mcg daily.      Zahra Garcia MA      Requested Prescriptions   Pending Prescriptions Disp Refills     levothyroxine (SYNTHROID/LEVOTHROID) 50 MCG tablet 30 tablet 11     Sig: Take 1 tablet (50 mcg) by mouth daily       There is no refill protocol information for this order

## 2021-06-15 NOTE — TELEPHONE ENCOUNTER
Sent new prescription for 50 mcg levothyroxine to [facility] in [facility] in [place].    Hill GILLIS RN....6/15/2021 9:11 AM

## 2021-09-05 ENCOUNTER — HEALTH MAINTENANCE LETTER (OUTPATIENT)
Age: 51
End: 2021-09-05

## 2021-10-31 ENCOUNTER — HEALTH MAINTENANCE LETTER (OUTPATIENT)
Age: 51
End: 2021-10-31

## 2021-12-29 ENCOUNTER — ANCILLARY PROCEDURE (OUTPATIENT)
Dept: MAMMOGRAPHY | Facility: CLINIC | Age: 51
End: 2021-12-29
Attending: FAMILY MEDICINE
Payer: COMMERCIAL

## 2021-12-29 DIAGNOSIS — Z12.31 VISIT FOR SCREENING MAMMOGRAM: ICD-10-CM

## 2021-12-29 PROCEDURE — 77067 SCR MAMMO BI INCL CAD: CPT | Mod: TC | Performed by: RADIOLOGY

## 2021-12-29 PROCEDURE — 77063 BREAST TOMOSYNTHESIS BI: CPT | Mod: TC | Performed by: RADIOLOGY

## 2022-01-17 ENCOUNTER — THERAPY VISIT (OUTPATIENT)
Dept: PHYSICAL THERAPY | Facility: CLINIC | Age: 52
End: 2022-01-17
Payer: COMMERCIAL

## 2022-01-17 DIAGNOSIS — M25.511 ACUTE PAIN OF RIGHT SHOULDER: Primary | ICD-10-CM

## 2022-01-17 PROCEDURE — 97161 PT EVAL LOW COMPLEX 20 MIN: CPT | Mod: GP | Performed by: PHYSICAL THERAPIST

## 2022-01-17 PROCEDURE — 97110 THERAPEUTIC EXERCISES: CPT | Mod: GP | Performed by: PHYSICAL THERAPIST

## 2022-01-17 NOTE — PROGRESS NOTES
Physical Therapy Initial Evaluation  Subjective:  The history is provided by the patient. No  was used.   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  New episode of R shoulder pain began 11/1/2021 insidiously.  Similar to L shoulder side 2 years ago which resolved well with PT and HEP.  Limited IR/ext>elevations>ER.  MMT 5/5 except abd 4+/5 with minimal pain.  Neck screen negative, + Neer's, - Rotator cuff tear test and O' Briens. Fwd head posture, rounded shoulders and poor periscapular control.  Reports lifelong hx of scoliosis also.     PMH includes depression, overweight, thyroid problems.  Medications for anti-depressants and thyroid..         Type of problem:  Right shoulder.    This is a new condition.  Condition occurred with:  Unknown cause.  Where condition occurred: for unknown reasons.  Patient reports pain:  Anterior and lateral.  Pain is described as sharp, shooting and aching and is intermittent.  Pain radiates to:  Upper arm. Pain timing: activity dependent.  Since onset symptoms are unchanged.  Associated symptoms:  Loss of motion/stiffness. Symptoms are exacerbated by carrying, lifting, using arm at shoulder level, certain positions and using arm behind back  and relieved by rest.      Restrictions due to condition include:  Working in normal job without restrictions.  Barriers include:  None as reported by patient.                        Objective:  System                   Shoulder Evaluation:  ROM:  AROM:    Flexion:  Left:  165    Right:  150  Extension: Left: 60Right: 45  Abduction:  Left: 165   Right:  145      External Rotation:  Left:  75    Right:  65          Flexion/External Rotation:  Left:  T2    Right:  C7  Extension/Internal Rotation:  Left:  T6    Right:  L4 right side flank    PROM:    Flexion:  Right: 155    Extension:  Right:  60  Abduction:  Right:  150                          Strength:     Flexion: Left:5/5   Pain:    Right: 5/5  Strong/painful     Pain:     Abduction:  Left: 5/5  Pain:    Right: 4+/5   Strong/painful    Pain:    Internal Rotation:  Left:5/5     Pain:    Right: 5/5   Strong/painful    Pain:  External Rotation:   Left:5/5     Pain:   Right:5/5   Strong/painful    Pain:        Elbow Flexion:  Left:5/5     Pain:    Right:5/5     Pain:  Elbow Extension:  Left:5/5     Pain:    Right:5/5     Pain:  Stability Testing:  not assessed      Special Tests:      Right shoulder positive for the following special tests:Impingement  Right shoulder negative for the following special tests:Labral and Rotator cuff tear  Palpation:      Right shoulder tenderness present at: Levator and Rhomboids  Mobility Tests:      Glenohumeral posterior right:  Hypomobile  Glenohumeral inferior right:  Hypomobile      Scapulothoracic right:  Hypomobile    Scapulohumeral rhythm right:  Hypomobile                                   General     ROS    Assessment/Plan:    Patient is a 51 year old female with right side shoulder complaints.    Patient has the following significant findings with corresponding treatment plan.                Diagnosis 1:  R shoulder pain, impingement  Pain -  hot/cold therapy, manual therapy, STS, splint/taping/bracing/orthotics, self management, education, directional preference exercise and home program  Decreased ROM/flexibility - manual therapy and therapeutic exercise  Decreased joint mobility - manual therapy and therapeutic exercise  Decreased strength - therapeutic exercise and therapeutic activities  Decreased proprioception - neuro re-education and therapeutic activities  Impaired muscle performance - neuro re-education  Decreased function - therapeutic activities  Impaired posture - neuro re-education      Cumulative Therapy Evaluation is: Low complexity.    Previous and current functional limitations:  (See Goal Flow Sheet for this information)    Short term and Long term goals:  (See Goal Flow Sheet for this information)     Communication ability:  Patient appears to be able to clearly communicate and understand verbal and written communication and follow directions correctly.  Treatment Explanation - The following has been discussed with the patient:   RX ordered/plan of care  Anticipated outcomes  Possible risks and side effects  This patient would benefit from PT intervention to resume normal activities.   Rehab potential is excellent.    Frequency:  2 X a month, once daily  Duration:  for 2 months  Discharge Plan:  Achieve all LTG.  Independent in home treatment program.  Reach maximal therapeutic benefit.    Please refer to the daily flowsheet for treatment today, total treatment time and time spent performing 1:1 timed codes.

## 2022-01-31 ENCOUNTER — THERAPY VISIT (OUTPATIENT)
Dept: PHYSICAL THERAPY | Facility: CLINIC | Age: 52
End: 2022-01-31
Payer: COMMERCIAL

## 2022-01-31 DIAGNOSIS — M25.511 SHOULDER PAIN, RIGHT: Primary | ICD-10-CM

## 2022-01-31 PROCEDURE — 97112 NEUROMUSCULAR REEDUCATION: CPT | Mod: GP | Performed by: PHYSICAL THERAPY ASSISTANT

## 2022-01-31 PROCEDURE — 97110 THERAPEUTIC EXERCISES: CPT | Mod: GP | Performed by: PHYSICAL THERAPY ASSISTANT

## 2022-02-14 ENCOUNTER — THERAPY VISIT (OUTPATIENT)
Dept: PHYSICAL THERAPY | Facility: CLINIC | Age: 52
End: 2022-02-14
Payer: COMMERCIAL

## 2022-02-14 DIAGNOSIS — M25.511 SHOULDER PAIN, RIGHT: Primary | ICD-10-CM

## 2022-02-14 PROCEDURE — 97112 NEUROMUSCULAR REEDUCATION: CPT | Mod: GP | Performed by: PHYSICAL THERAPY ASSISTANT

## 2022-02-14 PROCEDURE — 97110 THERAPEUTIC EXERCISES: CPT | Mod: GP | Performed by: PHYSICAL THERAPY ASSISTANT

## 2022-03-02 ENCOUNTER — THERAPY VISIT (OUTPATIENT)
Dept: PHYSICAL THERAPY | Facility: CLINIC | Age: 52
End: 2022-03-02
Payer: COMMERCIAL

## 2022-03-02 DIAGNOSIS — M25.511 SHOULDER PAIN, RIGHT: Primary | ICD-10-CM

## 2022-03-02 PROCEDURE — 97112 NEUROMUSCULAR REEDUCATION: CPT | Mod: GP | Performed by: PHYSICAL THERAPY ASSISTANT

## 2022-03-02 PROCEDURE — 97110 THERAPEUTIC EXERCISES: CPT | Mod: GP | Performed by: PHYSICAL THERAPY ASSISTANT

## 2022-03-02 NOTE — PROGRESS NOTES
"Subjective:  HPI  Physical Exam                    Objective:  System    Physical Exam    General     ROS    Assessment/Plan:    DISCHARGE REPORT    Progress reporting period is from 1/17/2022 to 3/2/2022.       SUBJECTIVE   Pt reports shldr feeling much better and can do all ADL;s painfree. Cont to get mild aching with lying on R side to sleep but is minimal. Feels she is ready to be done with PT.      Current Pain level: 0/10.      Initial Pain level: 5/10.   Changes in function:  Yes (See Goal flowsheet attached for changes in current functional level)  Adverse reaction to treatment or activity: None    OBJECTIVE    Objective: AROM R shldr = to L with exception of IR/EXT with 2\" loss post rx. MMT all motions 5/5 with no pain with resisted testing.  Demonstrates good scapular control with prone ex. Have instructed pt in self progression of ex in future.     ASSESSMENT/PLAN  Updated problem list and treatment plan: Diagnosis 1:  R shoulder pain   STG/LTGs have been met or progress has been made towards goals:  Yes (See Goal flow sheet completed today.)  Assessment of Progress: The patient has met all of their long term goals.  Self Management Plans:  Patient has been instructed in a home treatment program.  Patient is independent in a home treatment program.  I have re-evaluated this patient and find that the nature, scope, duration and intensity of the therapy is appropriate for the medical condition of the patient.  Nazia continues to require the following intervention to meet STG and LTG's:  PT intervention is no longer required to meet STG/LTG.    Recommendations:  This patient is ready to be discharged from therapy and continue their home treatment program.  The progress note/discharge summary was written in collaboration with and reviewed by the physical therapist.    Please refer to the daily flowsheet for treatment today, total treatment time and time spent performing 1:1 timed codes.          "

## 2022-03-21 DIAGNOSIS — F33.0 MAJOR DEPRESSIVE DISORDER, RECURRENT EPISODE, MILD (H): ICD-10-CM

## 2022-03-21 RX ORDER — CITALOPRAM HYDROBROMIDE 20 MG/1
TABLET ORAL
Qty: 90 TABLET | Refills: 0 | Status: SHIPPED | OUTPATIENT
Start: 2022-03-21 | End: 2022-04-14

## 2022-03-21 NOTE — TELEPHONE ENCOUNTER
**Scheduled for next visit with Dr. Gregorio 4/14/22    Routing refill request to provider for review/approval because:  PHQ9 score less then 5 in past 6 months    PHQ 8/29/2019 1/21/2020 1/19/2021   PHQ-9 Total Score 1 1 2   Q9: Thoughts of better off dead/self-harm past 2 weeks Not at all Not at all Not at all      Sindi Campos RN

## 2022-03-23 ENCOUNTER — OFFICE VISIT (OUTPATIENT)
Dept: FAMILY MEDICINE | Facility: CLINIC | Age: 52
End: 2022-03-23
Payer: COMMERCIAL

## 2022-03-23 VITALS
HEIGHT: 68 IN | RESPIRATION RATE: 14 BRPM | BODY MASS INDEX: 38.68 KG/M2 | SYSTOLIC BLOOD PRESSURE: 124 MMHG | TEMPERATURE: 98.1 F | HEART RATE: 52 BPM | OXYGEN SATURATION: 99 % | DIASTOLIC BLOOD PRESSURE: 72 MMHG | WEIGHT: 255.2 LBS

## 2022-03-23 DIAGNOSIS — N90.7 EPIDERMAL CYST OF VULVA: Primary | ICD-10-CM

## 2022-03-23 PROCEDURE — 99213 OFFICE O/P EST LOW 20 MIN: CPT | Performed by: FAMILY MEDICINE

## 2022-03-23 ASSESSMENT — PATIENT HEALTH QUESTIONNAIRE - PHQ9: SUM OF ALL RESPONSES TO PHQ QUESTIONS 1-9: 2

## 2022-03-23 ASSESSMENT — PAIN SCALES - GENERAL: PAINLEVEL: NO PAIN (0)

## 2022-03-23 NOTE — PROGRESS NOTES
"  Assessment & Plan     Epidermal cyst of vulva  - Cluster of multiple small epidermoid cysts on right labia majora  - Patient reassured.  No treatment needed at this time.    - Advised return to clinic if the cysts enlarge or are causing any pain    [facility]    =================================================================================    Subjective   Nazia is a 51 year old who presents for the following health issues    Vaginal Problem   The current episode started 2 days ago.   History of Present Illness       Reason for visit:  Vaginal spots, maybe warts  Symptoms include:  None  Symptom intensity:  Mild  Symptom progression:  Staying the same  Had these symptoms before:  No  What makes it worse:  No  What makes it better:  No    She eats 2-3 servings of fruits and vegetables daily.She consumes 0 sweetened beverage(s) daily.She exercises with enough effort to increase her heart rate 10 to 19 minutes per day.  She exercises with enough effort to increase her heart rate 3 or less days per week.   She is taking medications regularly.     Patient noticed multiple bumps on her vulva in the shower a couple days ago.  No new sexual partners.  She is happily .  No vaginal discharge or itching.  No abdominal pain.  No urinary symptoms.  The bumps are not painful.      Review of Systems   Genitourinary: Positive for vaginal discharge.         Objective    /72 (BP Location: Right arm, Patient Position: Chair, Cuff Size: Adult Large)   Pulse 52   Temp 98.1  F (36.7  C) (Oral)   Resp 14   Ht 1.72 m (5' 7.72\")   Wt 115.8 kg (255 lb 3.2 oz)   SpO2 99%   BMI 39.13 kg/m    Body mass index is 39.13 kg/m .  Physical Exam   GENERAL: healthy, alert and no distress   (female): Cluster of multiple epidermoid cysts on the right labia majora, all measuring approx 3 mm in diameter           "

## 2022-03-23 NOTE — PATIENT INSTRUCTIONS
Patient Education     Epidermoid Cyst, No Infection  An epidermoid cyst is a small abnormal growth in the top layers of the skin. It's filled with keratin, the same proteins that make up your hair and nails. An epidermoid cyst may incorrectly be called a sebaceous cyst.   Some general facts about epidermoid cysts:     An epidermoid cyst is a sac filled with material from skin secretions. It can grow anywhere on the body. But it's most often found on the face, behind the ears, and on the chest or upper back. It often has an open, enlarged pore in the middle of it.    The material in the cyst is often cheesy, fatty, or oily. The material can be thick (like cottage cheese) or liquid.    The area around the cyst may smell bad. If the cyst breaks open, the material inside it often smells bad too.    The cyst is usually firm and you can usually move it slightly if you try.    The cyst can be smaller than a pea or as large as a few inches.    It's usually not painful, unless it becomes inflamed or infected.  Causes  Epidermoid cysts are caused when skin (epidermal) cells move under the skin surface, or are covered over by it. These cells continue to multiply, like skin does normally. They then form a wall around themselves (cyst) and secrete normal skin material (keratin). In most cases, epidermoid cysts occur for no known reason. They may also occur because of an injury to the skin or from acne      Symptoms  Symptoms of an epidermoid cyst include:    Feeling a lump just beneath the skin    It may or may not be painful    The cyst may or may not smell bad    The cyst may become inflamed or red    The cyst may leak fluid or thick material  Home care  Epidermoid cysts often go away without any treatment. If your cyst doesn t go away, and it bothers you, it may be drained or removed. If the cyst drains on its own, it may return. Resist the temptation to squeeze, pop, stick a needle in it, or cut it open. This often leads  to an infection and scarring. If it gets severely inflamed or infected, seek medical care. Be sure to clean the cyst area when bathing or showering. Watch for the signs of infection listed below.   Follow-up care  Follow up with your healthcare provider, or as advised.  When to seek medical advice  Call your healthcare provider right away if any of these occur:    Swelling, redness, or pain    Pus coming from the cyst    Fever  [employer] last reviewed this educational content on 7/1/2019 2000-2021 [employer]. All rights reserved. This information is not intended as a substitute for professional medical care. Always follow your healthcare professional's instructions.

## 2022-03-27 DIAGNOSIS — R94.6 ABNORMAL FINDING ON THYROID FUNCTION TEST: ICD-10-CM

## 2022-03-29 RX ORDER — LIOTHYRONINE SODIUM 5 UG/1
5 TABLET ORAL DAILY
Qty: 90 TABLET | Refills: 3 | OUTPATIENT
Start: 2022-03-29

## 2022-04-04 DIAGNOSIS — R94.6 ABNORMAL FINDING ON THYROID FUNCTION TEST: ICD-10-CM

## 2022-04-04 RX ORDER — LIOTHYRONINE SODIUM 5 UG/1
5 TABLET ORAL DAILY
Qty: 90 TABLET | Refills: 3 | OUTPATIENT
Start: 2022-04-04

## 2022-04-05 NOTE — TELEPHONE ENCOUNTER
LIOTHYRONINE SOD 5 MCG TAB  Last Written Prescription Date:  6/9/2021  Last Fill Quantity: 30,   # refills: 11  Sent to:  [facility] [place] [facility] - [place], [place] - [address] [place] [address]  Last Office Visit : 4/28/21 Juan Manuel    Future Office visit:  None     Too soon for refill. 6/9/21#30/11 Rf to [id number]  Will need reestablish with new provider, Juan Manuel waterman.

## 2022-04-08 DIAGNOSIS — R94.6 ABNORMAL FINDING ON THYROID FUNCTION TEST: ICD-10-CM

## 2022-04-11 RX ORDER — LIOTHYRONINE SODIUM 5 UG/1
5 TABLET ORAL DAILY
Qty: 90 TABLET | Refills: 3 | OUTPATIENT
Start: 2022-04-11

## 2022-04-11 NOTE — TELEPHONE ENCOUNTER
Former yovani pt not set to est care with new endo.    Nelda WISEMAN RN Specialty Triage 4/11/2022 2:24 PM

## 2022-04-14 ENCOUNTER — OFFICE VISIT (OUTPATIENT)
Dept: FAMILY MEDICINE | Facility: CLINIC | Age: 52
End: 2022-04-14
Payer: COMMERCIAL

## 2022-04-14 VITALS
HEART RATE: 68 BPM | WEIGHT: 250 LBS | SYSTOLIC BLOOD PRESSURE: 120 MMHG | HEIGHT: 68 IN | DIASTOLIC BLOOD PRESSURE: 76 MMHG | BODY MASS INDEX: 37.89 KG/M2 | OXYGEN SATURATION: 96 %

## 2022-04-14 DIAGNOSIS — R94.6 ABNORMAL FINDING ON THYROID FUNCTION TEST: ICD-10-CM

## 2022-04-14 DIAGNOSIS — Z12.11 SCREENING FOR COLON CANCER: ICD-10-CM

## 2022-04-14 DIAGNOSIS — Z13.220 SCREENING FOR LIPID DISORDERS: ICD-10-CM

## 2022-04-14 DIAGNOSIS — Z13.1 SCREENING FOR DIABETES MELLITUS: ICD-10-CM

## 2022-04-14 DIAGNOSIS — Z12.11 SCREEN FOR COLON CANCER: ICD-10-CM

## 2022-04-14 DIAGNOSIS — Z00.00 ROUTINE GENERAL MEDICAL EXAMINATION AT A HEALTH CARE FACILITY: Primary | ICD-10-CM

## 2022-04-14 DIAGNOSIS — E55.9 VITAMIN D DEFICIENCY: ICD-10-CM

## 2022-04-14 DIAGNOSIS — M25.50 POLYARTHRALGIA: ICD-10-CM

## 2022-04-14 DIAGNOSIS — G47.33 OSA (OBSTRUCTIVE SLEEP APNEA): ICD-10-CM

## 2022-04-14 DIAGNOSIS — Z11.59 NEED FOR HEPATITIS C SCREENING TEST: ICD-10-CM

## 2022-04-14 DIAGNOSIS — F33.0 MAJOR DEPRESSIVE DISORDER, RECURRENT EPISODE, MILD (H): ICD-10-CM

## 2022-04-14 DIAGNOSIS — E66.01 MORBID OBESITY (H): ICD-10-CM

## 2022-04-14 DIAGNOSIS — R53.82 CHRONIC FATIGUE: ICD-10-CM

## 2022-04-14 LAB
BASOPHILS # BLD AUTO: 0 10E3/UL (ref 0–0.2)
BASOPHILS NFR BLD AUTO: 0 %
CRP SERPL-MCNC: 3 MG/L (ref 0–8)
DEPRECATED CALCIDIOL+CALCIFEROL SERPL-MC: 34 UG/L (ref 20–75)
EOSINOPHIL # BLD AUTO: 0.2 10E3/UL (ref 0–0.7)
EOSINOPHIL NFR BLD AUTO: 3 %
ERYTHROCYTE [DISTWIDTH] IN BLOOD BY AUTOMATED COUNT: 13.2 % (ref 10–15)
ERYTHROCYTE [SEDIMENTATION RATE] IN BLOOD BY WESTERGREN METHOD: 12 MM/HR (ref 0–30)
HCT VFR BLD AUTO: 44.2 % (ref 35–47)
HCV AB SERPL QL IA: NONREACTIVE
HGB BLD-MCNC: 14.3 G/DL (ref 11.7–15.7)
LYMPHOCYTES # BLD AUTO: 1.1 10E3/UL (ref 0.8–5.3)
LYMPHOCYTES NFR BLD AUTO: 22 %
MCH RBC QN AUTO: 29.5 PG (ref 26.5–33)
MCHC RBC AUTO-ENTMCNC: 32.4 G/DL (ref 31.5–36.5)
MCV RBC AUTO: 91 FL (ref 78–100)
MONOCYTES # BLD AUTO: 0.3 10E3/UL (ref 0–1.3)
MONOCYTES NFR BLD AUTO: 6 %
NEUTROPHILS # BLD AUTO: 3.4 10E3/UL (ref 1.6–8.3)
NEUTROPHILS NFR BLD AUTO: 69 %
PLATELET # BLD AUTO: 255 10E3/UL (ref 150–450)
RBC # BLD AUTO: 4.84 10E6/UL (ref 3.8–5.2)
T3FREE SERPL-MCNC: 3.3 PG/ML (ref 2.3–4.2)
WBC # BLD AUTO: 5 10E3/UL (ref 4–11)

## 2022-04-14 PROCEDURE — 96127 BRIEF EMOTIONAL/BEHAV ASSMT: CPT | Performed by: STUDENT IN AN ORGANIZED HEALTH CARE EDUCATION/TRAINING PROGRAM

## 2022-04-14 PROCEDURE — 82306 VITAMIN D 25 HYDROXY: CPT | Performed by: STUDENT IN AN ORGANIZED HEALTH CARE EDUCATION/TRAINING PROGRAM

## 2022-04-14 PROCEDURE — 86038 ANTINUCLEAR ANTIBODIES: CPT | Performed by: STUDENT IN AN ORGANIZED HEALTH CARE EDUCATION/TRAINING PROGRAM

## 2022-04-14 PROCEDURE — 86803 HEPATITIS C AB TEST: CPT | Performed by: STUDENT IN AN ORGANIZED HEALTH CARE EDUCATION/TRAINING PROGRAM

## 2022-04-14 PROCEDURE — 36415 COLL VENOUS BLD VENIPUNCTURE: CPT | Performed by: STUDENT IN AN ORGANIZED HEALTH CARE EDUCATION/TRAINING PROGRAM

## 2022-04-14 PROCEDURE — 84443 ASSAY THYROID STIM HORMONE: CPT | Performed by: STUDENT IN AN ORGANIZED HEALTH CARE EDUCATION/TRAINING PROGRAM

## 2022-04-14 PROCEDURE — 83550 IRON BINDING TEST: CPT | Performed by: STUDENT IN AN ORGANIZED HEALTH CARE EDUCATION/TRAINING PROGRAM

## 2022-04-14 PROCEDURE — 82947 ASSAY GLUCOSE BLOOD QUANT: CPT | Performed by: STUDENT IN AN ORGANIZED HEALTH CARE EDUCATION/TRAINING PROGRAM

## 2022-04-14 PROCEDURE — 86431 RHEUMATOID FACTOR QUANT: CPT | Performed by: STUDENT IN AN ORGANIZED HEALTH CARE EDUCATION/TRAINING PROGRAM

## 2022-04-14 PROCEDURE — 85025 COMPLETE CBC W/AUTO DIFF WBC: CPT | Performed by: STUDENT IN AN ORGANIZED HEALTH CARE EDUCATION/TRAINING PROGRAM

## 2022-04-14 PROCEDURE — 84481 FREE ASSAY (FT-3): CPT | Performed by: STUDENT IN AN ORGANIZED HEALTH CARE EDUCATION/TRAINING PROGRAM

## 2022-04-14 PROCEDURE — 85652 RBC SED RATE AUTOMATED: CPT | Performed by: STUDENT IN AN ORGANIZED HEALTH CARE EDUCATION/TRAINING PROGRAM

## 2022-04-14 PROCEDURE — 82728 ASSAY OF FERRITIN: CPT | Performed by: STUDENT IN AN ORGANIZED HEALTH CARE EDUCATION/TRAINING PROGRAM

## 2022-04-14 PROCEDURE — 80061 LIPID PANEL: CPT | Performed by: STUDENT IN AN ORGANIZED HEALTH CARE EDUCATION/TRAINING PROGRAM

## 2022-04-14 PROCEDURE — 86200 CCP ANTIBODY: CPT | Performed by: STUDENT IN AN ORGANIZED HEALTH CARE EDUCATION/TRAINING PROGRAM

## 2022-04-14 PROCEDURE — 84439 ASSAY OF FREE THYROXINE: CPT | Performed by: STUDENT IN AN ORGANIZED HEALTH CARE EDUCATION/TRAINING PROGRAM

## 2022-04-14 PROCEDURE — 86140 C-REACTIVE PROTEIN: CPT | Performed by: STUDENT IN AN ORGANIZED HEALTH CARE EDUCATION/TRAINING PROGRAM

## 2022-04-14 PROCEDURE — 99396 PREV VISIT EST AGE 40-64: CPT | Performed by: STUDENT IN AN ORGANIZED HEALTH CARE EDUCATION/TRAINING PROGRAM

## 2022-04-14 PROCEDURE — 99214 OFFICE O/P EST MOD 30 MIN: CPT | Mod: 25 | Performed by: STUDENT IN AN ORGANIZED HEALTH CARE EDUCATION/TRAINING PROGRAM

## 2022-04-14 RX ORDER — CITALOPRAM HYDROBROMIDE 20 MG/1
20 TABLET ORAL DAILY
Qty: 90 TABLET | Refills: 3 | Status: SHIPPED | OUTPATIENT
Start: 2022-04-14 | End: 2023-05-04

## 2022-04-14 ASSESSMENT — ANXIETY QUESTIONNAIRES
2. NOT BEING ABLE TO STOP OR CONTROL WORRYING: SEVERAL DAYS
5. BEING SO RESTLESS THAT IT IS HARD TO SIT STILL: NOT AT ALL
3. WORRYING TOO MUCH ABOUT DIFFERENT THINGS: NOT AT ALL
GAD7 TOTAL SCORE: 3
GAD7 TOTAL SCORE: 3
6. BECOMING EASILY ANNOYED OR IRRITABLE: SEVERAL DAYS
4. TROUBLE RELAXING: NOT AT ALL
1. FEELING NERVOUS, ANXIOUS, OR ON EDGE: SEVERAL DAYS
GAD7 TOTAL SCORE: 3
7. FEELING AFRAID AS IF SOMETHING AWFUL MIGHT HAPPEN: NOT AT ALL
7. FEELING AFRAID AS IF SOMETHING AWFUL MIGHT HAPPEN: NOT AT ALL

## 2022-04-14 ASSESSMENT — ENCOUNTER SYMPTOMS
DIZZINESS: 0
NAUSEA: 0
SORE THROAT: 0
ABDOMINAL PAIN: 0
DIARRHEA: 0
BREAST MASS: 0
MYALGIAS: 1
JOINT SWELLING: 0
DYSURIA: 0
CHILLS: 0
SHORTNESS OF BREATH: 0
CONSTIPATION: 0
PARESTHESIAS: 0
HEADACHES: 0
HEARTBURN: 0
COUGH: 0
NERVOUS/ANXIOUS: 0
HEMATOCHEZIA: 0
ARTHRALGIAS: 0
WEAKNESS: 0
FREQUENCY: 0
FEVER: 0
EYE PAIN: 0
HEMATURIA: 0
PALPITATIONS: 0

## 2022-04-14 ASSESSMENT — PATIENT HEALTH QUESTIONNAIRE - PHQ9
SUM OF ALL RESPONSES TO PHQ QUESTIONS 1-9: 6
10. IF YOU CHECKED OFF ANY PROBLEMS, HOW DIFFICULT HAVE THESE PROBLEMS MADE IT FOR YOU TO DO YOUR WORK, TAKE CARE OF THINGS AT HOME, OR GET ALONG WITH OTHER PEOPLE: VERY DIFFICULT
SUM OF ALL RESPONSES TO PHQ QUESTIONS 1-9: 6

## 2022-04-14 NOTE — PROGRESS NOTES
SUBJECTIVE:   CC: Nazia Jasso is an 51 year old woman who presents for preventive health visit.     \  Patient has been advised of split billing requirements and indicates understanding: Yes  Healthy Habits:     Getting at least 3 servings of Calcium per day:  NO    Bi-annual eye exam:  Yes    Dental care twice a year:  Yes    Sleep apnea or symptoms of sleep apnea:  Sleep apnea    Diet:  Regular (no restrictions)    Frequency of exercise:  1 day/week    Duration of exercise:  15-30 minutes    Taking medications regularly:  Yes    Medication side effects:  None    PHQ-2 Total Score: 2    Additional concerns today:  Yes        Fasting    Chronic fatigue-  Ache and pain all the time, always feels like she did a lot of lifting  She does get a lot of low back pain from scoliosis   Has arms, shoulder and hip pain, sometimes ankles  Has a lot of morning stiffness that improves with use   Feels like joint swelling but doesn't see this.   Very sensitive to touch. Feels tender all over.   Has trouble sleeping due to pains  No known autoimmune conditions in her family  Does get flares of feeling achy and chills and improves with time and rest      Hypothyroid   - started on medication last year by endocrinology. Did notice some improvements in cold intolerance. Didn't notice any other improvements since starting treatment     Vitamin D deficiency - takes supplement but hasn't improved much.     KIYA - uses a dental appliance, no CPAP.  states she does snore still.     Mammogram UTD 12/2021 normal   Pap last 1/21/20     MDD - on celexa. Recently started therapy again due to family concerns. Teenager is going through gender identity.  Mood hasnt been as good as it has been in the past.       PHQ 1/19/2021 3/23/2022 4/14/2022   PHQ-9 Total Score 2 2 6   Q9: Thoughts of better off dead/self-harm past 2 weeks Not at all Not at all Not at all     ANDRES-7 SCORE 1/21/2020 4/14/2022   Total Score - 3 (minimal  anxiety)   Total Score 1 3               Today's PHQ-2 Score:   PHQ-2 ( 1999 [employer]) 4/14/2022   Q1: Little interest or pleasure in doing things 1   Q2: Feeling down, depressed or hopeless 1   PHQ-2 Score 2   PHQ-2 Total Score (12-17 Years)- Positive if 3 or more points; Administer PHQ-A if positive -   Q1: Little interest or pleasure in doing things Several days   Q2: Feeling down, depressed or hopeless Several days   PHQ-2 Score 2       Abuse: Current or Past (Physical, Sexual or Emotional) - No  Do you feel safe in your environment? Yes    Have you ever done Advance Care Planning? (For example, a Health Directive, POLST, or a discussion with a medical provider or your loved ones about your wishes): No, advance care planning information given to patient to review.  Patient declined advance care planning discussion at this time.    Social History     Tobacco Use     Smoking status: Never Smoker     Smokeless tobacco: Never Used   Substance Use Topics     Alcohol use: Yes     Comment: 1-2 drink per month     If you drink alcohol do you typically have >3 drinks per day or >7 drinks per week? No    Alcohol Use 4/14/2022   Prescreen: >3 drinks/day or >7 drinks/week? No   Prescreen: >3 drinks/day or >7 drinks/week? -       Reviewed orders with patient.  Reviewed health maintenance and updated orders accordingly - Yes  Lab work is in process    Breast Cancer Screening:    Breast CA Risk Assessment (FHS-7) 4/14/2022   Do you have a family history of breast, colon, or ovarian cancer? No / Unknown       click delete button to remove this line now  Mammogram Screening: Recommended annual mammography  Pertinent mammograms are reviewed under the imaging tab.    History of abnormal Pap smear:   NO - age 30-65 PAP every 5 years with negative HPV co-testing recommended  Last 3 Pap and HPV Results:   PAP / HPV Latest Ref Rng & Units 1/21/2020 6/17/2016   PAP (Historical) - NIL ASC-US(A)   HPV16 NEG:Negative Negative Negative    HPV18 NEG:Negative Negative Negative   HRHPV NEG:Negative Negative Negative     PAP / HPV Latest Ref Rng & Units 2020   PAP (Historical) - NIL ASC-US(A)   HPV16 NEG:Negative Negative Negative   HPV18 NEG:Negative Negative Negative   HRHPV NEG:Negative Negative Negative     Reviewed and updated as needed this visit by clinical staff   Tobacco  Allergies  Meds   Med Hx  Surg Hx  Fam Hx  Soc Hx          Reviewed and updated as needed this visit by Provider     Meds               Past Medical History:   Diagnosis Date     ASCUS of cervix with negative high risk HPV 16 ASCUS/neg HR HPV.      Depressive disorder      Depressive disorder, not elsewhere classified     Depression (non-psychotic)     Endometriosis, site unspecified     Endometriosis     Female infertility of unspecified origin     Female infertility     Thyroid disease       History reviewed. No pertinent surgical history.  OB History    Para Term  AB Living   0 0 0 0 0 0   SAB IAB Ectopic Multiple Live Births   0 0 0 0 0       Review of Systems   Constitutional: Negative for chills and fever.   HENT: Negative for congestion, ear pain, hearing loss and sore throat.    Eyes: Negative for pain and visual disturbance.   Respiratory: Negative for cough and shortness of breath.    Cardiovascular: Negative for chest pain, palpitations and peripheral edema.   Gastrointestinal: Negative for abdominal pain, constipation, diarrhea, heartburn, hematochezia and nausea.   Breasts:  Negative for tenderness, breast mass and discharge.   Genitourinary: Negative for dysuria, frequency, genital sores, hematuria, pelvic pain, urgency, vaginal bleeding and vaginal discharge.   Musculoskeletal: Positive for myalgias. Negative for arthralgias and joint swelling.   Skin: Negative for rash.   Neurological: Negative for dizziness, weakness, headaches and paresthesias.   Psychiatric/Behavioral: Positive for mood changes. The  "patient is not nervous/anxious.           OBJECTIVE:   /76 (BP Location: Right arm, Patient Position: Sitting, Cuff Size: Adult Large)   Pulse 68   Ht 1.72 m (5' 7.72\")   Wt 113.4 kg (250 lb)   SpO2 96%   BMI 38.33 kg/m    Physical Exam  GENERAL: healthy, alert and no distress  EYES: Eyes grossly normal to inspection, PERRL and conjunctivae and sclerae normal  HENT: ear canals and TM's normal, nose and mouth without ulcers or lesions  NECK: no adenopathy, no asymmetry, masses, or scars and thyroid normal to palpation  RESP: lungs clear to auscultation - no rales, rhonchi or wheezes  BREAST: normal without masses, tenderness or nipple discharge and no palpable axillary masses or adenopathy  CV: regular rate and rhythm, normal S1 S2, no S3 or S4, no murmur, click or rub, no peripheral edema and peripheral pulses strong  ABDOMEN: soft, nontender, no hepatosplenomegaly, no masses and bowel sounds normal  MS: no gross musculoskeletal defects noted, no edema, tender points on trapezius L>R, tenderness diffusely on left shoulder and on anterior right shoulder, pain from buttocks distally with internal rotation of the hips.   SKIN: no suspicious lesions or rashes  NEURO: Normal strength and tone, mentation intact and speech normal  PSYCH: mentation appears normal, affect normal/bright    Diagnostic Test Results:  Labs reviewed in Epic    ASSESSMENT/PLAN:   (Z00.00) Routine general medical examination at a health care facility  (primary encounter diagnosis)  Comment: vitals normal. UTD on pap smear, last done 1/21/20. Mammogram 12/2021  Plan: REVIEW OF HEALTH MAINTENANCE PROTOCOL ORDERS            (F33.0) Major depressive disorder, recurrent episode, mild (H)  Comment: mood is moderately controlled on celexa, has been having increased home stress recently so she started therapy a few weeks ago. Has been on same dose of Celexa for a long time. discussed option to increase dose as well, but we opted to allow more " time for therapy to be effective. Follow up mood in 3 months  Plan: citalopram (CELEXA) 20 MG tablet           (Z12.11) Screen for colon cancer  Plan: COLOGUARD([employer])            (Z11.59) Need for hepatitis C screening test  Plan: Hepatitis C Screen Reflex to HCV RNA Quant and         Genotype            (M25.50) Polyarthralgia  Comment: polyarthralgia, muscle aches, chronic fatigue. Recommend checking for inflammatory/autoimmune causes. Recommend weight loss and improving physical activity, recommend low-impact activities - bike, swim. Recommend decreasing inflammatory foods. Discussed on differential is Fibromyalgia. We will obtain labs first. She would like to discuss with rheumatology further, referral placed   Plan: Anti Nuclear Deloris IgG by IFA with Reflex,         Rheumatoid factor, Cyclic Citrullinated Peptide        Antibody IgG, ESR: Erythrocyte sedimentation         rate, CRP, inflammation, Adult Rheumatology          Referral            (E66.01) Morbid obesity (H)  Comment: discussed weight loss for overall health and arthralgias.       (Z13.220) Screening for lipid disorders  Plan: Lipid panel reflex to direct LDL Fasting            (G47.33) KIYA (obstructive sleep apnea)  Comment: she uses a mandibular device currently, still snores when on this, but hasn't been told she has apnea while on it. With her chronic/worsening fatigue, recommend she be re-evaluated by sleep medicine to see if a CPAP would be better suited at this time   Plan: Adult Sleep Eval & Management          Referral            (R53.82) Chronic fatigue  Comment: will evaluate for anemia and vitamin D. She has been on vitamin D recently, we will see if the dose needs to be increased   Plan: Vitamin D Deficiency, Ferritin, Iron and iron         binding capacity, CBC with platelets and         differential            (E55.9) Vitamin D deficiency  Comment: recheck vit D levels   Plan: Vitamin D Deficiency         "    (Z13.1) Screening for diabetes mellitus  Plan: Glucose            Patient has been advised of split billing requirements and indicates understanding: Yes    COUNSELING:  Reviewed preventive health counseling, as reflected in patient instructions       Regular exercise       Healthy diet/nutrition       Colorectal Cancer Screening       Consider Hep C screening for all patients one time for ages 18-79 years       (Latasha)menopause management    Estimated body mass index is 38.33 kg/m  as calculated from the following:    Height as of this encounter: 1.72 m (5' 7.72\").    Weight as of this encounter: 113.4 kg (250 lb).    Weight management plan: Discussed healthy diet and exercise guidelines    She reports that she has never smoked. She has never used smokeless tobacco.      Counseling Resources:  ATP IV Guidelines  Pooled Cohorts Equation Calculator  Breast Cancer Risk Calculator  BRCA-Related Cancer Risk Assessment: FHS-7 Tool  FRAX Risk Assessment  ICSI Preventive Guidelines  Dietary Guidelines for Americans, 2010  USDA's MyPlate  ASA Prophylaxis  Lung CA Screening    [facility]  Answers for HPI/ROS submitted by the patient on 4/14/2022  If you checked off any problems, how difficult have these problems made it for you to do your work, take care of things at home, or get along with other people?: Very difficult  PHQ9 TOTAL SCORE: 6  ANDRES 7 TOTAL SCORE: 3      "

## 2022-04-14 NOTE — PATIENT INSTRUCTIONS
Call to schedule endocrinology follow up     You will receive a call to schedule your rheumatology and sleep medicine referral. I think transitioning to a CPAP may help some of your fatigue. We will check labs today to evaluate other causes of fatigue and joint pains. I provided some information on fibromyalgia. As the weather is getting nicer, I want you to start increasing your low impact exercises (swimming, biking, walking). You can attempt to decrease inflammatory foods from the diet as well to see If you have some improvements with this (gluten, dairy, alcohol)    Follow up on mental health in 1-3 months depending on how you are doing with therapy       Preventive Health Recommendations  Female Ages 50 - 64    Yearly exam: See your health care provider every year in order to  Review health changes.   Discuss preventive care.    Review your medicines if your doctor has prescribed any.    Get a Pap test every three years (unless you have an abnormal result and your provider advises testing more often).  If you get Pap tests with HPV test, you only need to test every 5 years, unless you have an abnormal result.   You do not need a Pap test if your uterus was removed (hysterectomy) and you have not had cancer.  You should be tested each year for STDs (sexually transmitted diseases) if you're at risk.   Have a mammogram every 1 to 2 years.  Have a colonoscopy at age 50, or have a yearly FIT test (stool test). These exams screen for colon cancer.    Have a cholesterol test every 5 years, or more often if advised.  Have a diabetes test (fasting glucose) every three years. If you are at risk for diabetes, you should have this test more often.   If you are at risk for osteoporosis (brittle bone disease), think about having a bone density scan (DEXA).    Shots: Get a flu shot each year. Get a tetanus shot every 10 years.    Nutrition:   Eat at least 5 servings of fruits and vegetables each day.  Eat whole-grain bread,  whole-wheat pasta and brown rice instead of white grains and rice.  Get adequate Calcium and Vitamin D.     Lifestyle  Exercise at least 150 minutes a week (30 minutes a day, 5 days a week). This will help you control your weight and prevent disease.  Limit alcohol to one drink per day.  No smoking.   Wear sunscreen to prevent skin cancer.   See your dentist every six months for an exam and cleaning.  See your eye doctor every 1 to 2 years.

## 2022-04-15 LAB
CCP AB SER IA-ACNC: 0.9 U/ML
CHOLEST SERPL-MCNC: 159 MG/DL
FASTING STATUS PATIENT QL REPORTED: YES
FASTING STATUS PATIENT QL REPORTED: YES
FERRITIN SERPL-MCNC: 25 NG/ML (ref 8–252)
GLUCOSE BLD-MCNC: 88 MG/DL (ref 70–99)
HDLC SERPL-MCNC: 52 MG/DL
IRON SATN MFR SERPL: 25 % (ref 15–46)
IRON SERPL-MCNC: 77 UG/DL (ref 35–180)
LDLC SERPL CALC-MCNC: 93 MG/DL
NONHDLC SERPL-MCNC: 107 MG/DL
RHEUMATOID FACT SER NEPH-ACNC: <6 IU/ML
T4 FREE SERPL-MCNC: 1.09 NG/DL (ref 0.76–1.46)
TIBC SERPL-MCNC: 313 UG/DL (ref 240–430)
TRIGL SERPL-MCNC: 72 MG/DL
TSH SERPL DL<=0.005 MIU/L-ACNC: 1.87 MU/L (ref 0.4–4)

## 2022-04-15 ASSESSMENT — ANXIETY QUESTIONNAIRES: GAD7 TOTAL SCORE: 3

## 2022-04-15 ASSESSMENT — PATIENT HEALTH QUESTIONNAIRE - PHQ9: SUM OF ALL RESPONSES TO PHQ QUESTIONS 1-9: 6

## 2022-04-20 LAB — ANA SER QL IF: NEGATIVE

## 2022-05-09 LAB — NONINV COLON CA DNA+OCC BLD SCRN STL QL: NEGATIVE

## 2022-06-06 DIAGNOSIS — R94.6 ABNORMAL FINDING ON THYROID FUNCTION TEST: ICD-10-CM

## 2022-06-08 RX ORDER — LIOTHYRONINE SODIUM 5 UG/1
5 TABLET ORAL DAILY
Qty: 30 TABLET | Refills: 2 | Status: SHIPPED | OUTPATIENT
Start: 2022-06-08 | End: 2022-10-06

## 2022-06-08 NOTE — TELEPHONE ENCOUNTER
Last Clinic Visit: Fidencio Zambrano MD  Endocrinology, Diabetes, and Metabolism  4/28/2021  [facility]  90 day refill provided per protocol, message on RX to schedule endo appointment

## 2022-06-14 ENCOUNTER — VIRTUAL VISIT (OUTPATIENT)
Dept: RHEUMATOLOGY | Facility: CLINIC | Age: 52
End: 2022-06-14
Attending: STUDENT IN AN ORGANIZED HEALTH CARE EDUCATION/TRAINING PROGRAM
Payer: COMMERCIAL

## 2022-06-14 DIAGNOSIS — M70.62 TROCHANTERIC BURSITIS OF BOTH HIPS: ICD-10-CM

## 2022-06-14 DIAGNOSIS — M79.10 MYALGIA: ICD-10-CM

## 2022-06-14 DIAGNOSIS — M25.562 CHRONIC PAIN OF BOTH KNEES: ICD-10-CM

## 2022-06-14 DIAGNOSIS — M25.50 POLYARTHRALGIA: Primary | ICD-10-CM

## 2022-06-14 DIAGNOSIS — M25.561 CHRONIC PAIN OF BOTH KNEES: ICD-10-CM

## 2022-06-14 DIAGNOSIS — M70.61 TROCHANTERIC BURSITIS OF BOTH HIPS: ICD-10-CM

## 2022-06-14 DIAGNOSIS — G47.8 NON-RESTORATIVE SLEEP: ICD-10-CM

## 2022-06-14 DIAGNOSIS — G89.29 CHRONIC PAIN OF BOTH KNEES: ICD-10-CM

## 2022-06-14 PROCEDURE — 99204 OFFICE O/P NEW MOD 45 MIN: CPT | Mod: 95 | Performed by: INTERNAL MEDICINE

## 2022-06-14 RX ORDER — CYCLOBENZAPRINE HCL 5 MG
TABLET ORAL
Qty: 45 TABLET | Refills: 2 | Status: SHIPPED | OUTPATIENT
Start: 2022-06-14

## 2022-06-14 NOTE — PROGRESS NOTES
Nazia Jasso who presents today with a chief complaint of  No chief complaint on file.      Joint Pains: Yes  Location: multiple joints  Onset: years  Intensity:  6/10  AM Stiffness: 30 Minutes  Alleviating/Aggravating Factors: yard work, up and down the stairs increase pain. Walking helps. Medications helpful?  Tolerating Meds: Yes  Other:      ROS:  Patient denies having: persistent dry eyes, +dry mouth, recurrent oral ulcers, patchy alopecia, active rashes, photosensitivity, history of psoriasis, active chest pain, active shortness of breath,+ active cough, active dysuria, history of kidney stones, active abdominal pain, active diarrhea, history of hematochezia, active dysphagia, history of peptic ulcer disease, history of HIV, tuberculosis, hepatitis B or C, Lyme disease, seizure history, raynaud's, active documented fevers, recent infections, + difficulty sleeping or chronic unrefreshing sleep, involuntary weight loss, loss of appetite, +excessive fatigue, +depression,+ anxiety,  recurrent sinus infections, history of inflammatory eye diseases (such as uveitis, scleritis, iritis, etc).     Information gathered by medical assistant incorporated into this note, was reviewed and discussed with the patient.    Problem List:  Patient Active Problem List   Diagnosis     CARDIOVASCULAR SCREENING; LDL GOAL LESS THAN 160     Mild major depression (H)     Family history of thyroid problem     ASCUS of cervix with negative high risk HPV     KIYA (obstructive sleep apnea)- moderate (AHI 18)     Obesity (BMI 35.0-39.9) with comorbidity (H)        PMH:   Past Medical History:   Diagnosis Date     ASCUS of cervix with negative high risk HPV 6/17/16    6/17/16 ASCUS/neg HR HPV.      Depressive disorder 1997     Depressive disorder, not elsewhere classified     Depression (non-psychotic)     Endometriosis, site unspecified     Endometriosis     Female infertility of unspecified origin     Female infertility      Thyroid disease        Surgical History:  No past surgical history on file.    Family History:  Family History   Problem Relation Age of Onset     C.A.D. Paternal Grandfather      Cerebrovascular Disease Paternal Grandfather      C.A.D. Maternal Grandfather      Hypertension Mother      Hypertension Maternal Grandmother      Prostate Cancer Father      Thyroid Disease Sister      Hypothyroidism Sister      No Known Problems Brother      Diabetes No family hx of      Breast Cancer No family hx of      Cancer - colorectal No family hx of        Social History:   reports that she has never smoked. She has never used smokeless tobacco. She reports current alcohol use. She reports that she does not use drugs.    Allergies:  Allergies   Allergen Reactions     Septra Ds [Sulfamethoxazole W-Trimethoprim] Hives     Happened at end of course from 12/01/2011-12/     Amoxicillin      Erythromycin      hives     Penicillins      hives        Current Medications:  Current Outpatient Medications   Medication Sig Dispense Refill     Cholecalciferol (VITAMIN D-3) 5000 units TABS        citalopram (CELEXA) 20 MG tablet Take 1 tablet (20 mg) by mouth daily 90 tablet 3     levothyroxine (SYNTHROID/LEVOTHROID) 50 MCG tablet Take 1 tablet (50 mcg) by mouth daily 30 tablet 11     liothyronine (CYTOMEL) 5 MCG tablet Take 1 tablet (5 mcg) by mouth daily For additional refills, please schedule a follow-up appointment with endocrinology 30 tablet 2     Multiple Vitamin (MULTIVITAMINS PO)              Physical Exam:  Following up today via video visit, per Covid-19 pandemic requirements.    Verbal consent has been obtained for this service by care team member.    Video call start time: 11:31 AM    Video call end time: 11:58 AM    [employer] utilized for video call.    Patient location for video visit: Home     Provider location for video visit:  Home (working remotely)        Summary/Assessment:    Pleasant 51-year-old female presents with  chronic multiple joint pains.    Patient explains that she has been experiencing joint pains for years primarily involving her knees.  Sometimes hears clicking sensations however, does not experience any locking or buckling.  Denies having joint swelling.  Has not had any x-rays.    Also experiences pains involving lateral hips bilaterally, where her hips protrude out.  Sometimes the pains wake her up in middle of the night    Sometimes has shoulder pains.  Had MRI of left shoulder back in 2019 which showed some signs of rotator cuff tendinopathy.  Left shoulder pains improved with PT.  Patient began experiencing right shoulder pains/stiffness about 6 months ago.  Pursued PT and right shoulder pains have also improved.    Adds sometimes her muscles feel supersensitive.    Admits to having chronic nonrestorative sleep with chronic fatigue.    Has history of KIYA.    States is a 5 foot 7-1/2 and weighs about 250 pounds.    For above pains, currently not taking medications.    Has tried tablets of Tylenol and has also tried 2 tablets of ibuprofen which have been beneficial in the past.  Has not tried taking these meds frequently or regularly.      Patient adds on 1 occasion she took some prednisone for pneumonia which helped her joint pains.    Patient had autoimmune work-up recently performed which was unrevealing.  Also noted to have normal ESR/CRP levels.    Given the above, my clinical suspicion at this time is that her arthralgias/myalgias appear to be more so mechanical nature.  Patient may have some degenerative joint disease contributing to knee pains given overweight body habitus and crepitus sensations described.  Patient have trochanteric bursitis contributing to lateral hip pains.  Patient may have a myofascial pain syndrome/fibromyalgia contributing to some of her arthralgias/myalgias given history of KIYA, chronic nonrestorative sleep, chronic fatigue and history anxiety/depression and description of  "\"supersensitive muscles to touch\".  However, this is a diagnosis of exclusion and sometimes may just be a secondary/superimposed condition.     Please see below for management plan.      Pertinent rheumatology/past medical history (please refer to above for more detailed history):      Chronic knee pains    Chronic lateral hip pains likely secondary to trochanteric bursitis    Chronic shoulder pains bilateral (Hx rotator cuff tendinopathy on left)    Fibromyalgia/myofascial pains (with nonrestorative sleep and chronic fatigue)    History of KIYA    Overweight    History anxiety/depression    History of pneumonia      Rheumatology medications provided/suggested:      Flexeril  Tylenol  Ibuprofen    Pertinent medication from other providers or from otc (please refer to above for more detailed med list):          Pertinent medications already tried:           Pertinent lab history:    Negative/unremarkable: Rheumatoid factor, CCP antibody, GERALD, ESR, CRP, TSH, glucose, hep C      Pertinent imaging/test history:    11/2019, MRI Left Shoulder   IMPRESSION:   1. Mild tendinosis of the supraspinatus. No rotator cuff tear is seen.  2. Mild degenerative changes of the acromioclavicular joint.   3. Mild subacromial bursitis.    Other:    Marital status:       How many kids:  2    Type of work:  Yes, teacher     Drinking alcohol: rarely, a glass in every few weeks     Tobacco use: no    Recreational drug use: no    Active contraceptive: n/a     History hysterectomy: no     Tubal ligation: no       Plan:      We will add low-dose Flexeril to act as a muscle relaxant hopefully improve her sleep.    For arthralgias/myalgias, suggest paient take over-the-counter Tylenol 500-1000 mg twice daily as needed for pain relief.    If insufficient relief with Tylenol suggest trying ibuprofen 400-800 mg 3 times daily as needed, to be taken with food.    Will refer patient to physical therapy to work on: Bilateral knees and lateral hip " pains.    We will obtain x-rays of bilateral knees.    Follow-up in 3 months.      Procedure note:     Total time, spent 55 minutes involved with patient care, includes placing orders, reviewing records and formulating management plan.      Major side effect profile of medications provided/suggested were discussed with the patient.    This note was transcribed using Dragon voice recognition software as a result unintentional grammatical errors or word substitutions may have occurred. Please contact our Rheumatology department if you need any clarification or if you have any related inquiries.    Thank you for referring this patient to our clinic.      Marlon Morris DO   ....................  6/14/2022   10:38 AM

## 2022-06-14 NOTE — PATIENT INSTRUCTIONS
Summary of Your Rheumatology Visit    Next Appointment:   3 Months    Medications:    Please follow directives on pill bottle on how to take medication(s) provided.    Referrals:      Tests:         Injections:      Other:

## 2022-07-05 ENCOUNTER — HOSPITAL ENCOUNTER (OUTPATIENT)
Dept: GENERAL RADIOLOGY | Facility: HOSPITAL | Age: 52
Discharge: HOME | End: 2022-07-05
Attending: INTERNAL MEDICINE | Admitting: INTERNAL MEDICINE
Payer: COMMERCIAL

## 2022-07-05 DIAGNOSIS — Z83.49 FAMILY HISTORY OF THYROID PROBLEM: ICD-10-CM

## 2022-07-05 DIAGNOSIS — M25.561 CHRONIC PAIN OF BOTH KNEES: ICD-10-CM

## 2022-07-05 DIAGNOSIS — G89.29 CHRONIC PAIN OF BOTH KNEES: ICD-10-CM

## 2022-07-05 DIAGNOSIS — M25.562 CHRONIC PAIN OF BOTH KNEES: ICD-10-CM

## 2022-07-05 PROCEDURE — 73560 X-RAY EXAM OF KNEE 1 OR 2: CPT | Mod: 50,FY

## 2022-07-05 RX ORDER — LEVOTHYROXINE SODIUM 50 UG/1
50 TABLET ORAL DAILY
Qty: 30 TABLET | Refills: 5 | Status: SHIPPED | OUTPATIENT
Start: 2022-07-05 | End: 2022-12-07

## 2022-07-05 NOTE — TELEPHONE ENCOUNTER
levothyroxine (SYNTHROID/LEVOTHROID) 50 MCG tablet      Last Written Prescription Date:  6/15/21  Last Fill Quantity: 30,   # refills: 11  Last Office Visit : Fidencio Zambrano MD  Endocrinology, Diabetes, and Metabolism  4/28/2021  [facility]  Future Office visit:  12/7/22 with Dr Trinidad    Routing refill request to provider for review/approval because:  Protocol permits 90 day refill, could refills be added to get to upcoming appointment

## 2022-07-05 NOTE — TELEPHONE ENCOUNTER
[facility] Call Center    Phone Message    May a detailed message be left on voicemail: yes     Reason for Call: Medication Refill Request    Has the patient contacted the pharmacy for the refill? Yes   Name of medication being requested: levothyroxine (SYNTHROID/LEVOTHROID) 50 MCG tablet     Provider who prescribed the medication: Juan Manuel    Pharmacy: [facility] [id number] [place], [place] - [address]    Date medication is needed: As soon as possible, pt has 2 left     Per pt previous pt of Juan Manuel and scheduled with Dr Trinidad in December and wondering if that provider can approve until appt date.      Action Taken: Message routed to:  Other: endo    Travel Screening: Not Applicable

## 2022-07-13 ENCOUNTER — THERAPY VISIT (OUTPATIENT)
Dept: PHYSICAL THERAPY | Facility: CLINIC | Age: 52
End: 2022-07-13
Payer: COMMERCIAL

## 2022-07-13 DIAGNOSIS — G89.29 CHRONIC PAIN OF BOTH KNEES: Primary | ICD-10-CM

## 2022-07-13 DIAGNOSIS — M70.62 TROCHANTERIC BURSITIS OF BOTH HIPS: ICD-10-CM

## 2022-07-13 DIAGNOSIS — M70.61 TROCHANTERIC BURSITIS OF BOTH HIPS: ICD-10-CM

## 2022-07-13 DIAGNOSIS — M25.562 CHRONIC PAIN OF BOTH KNEES: Primary | ICD-10-CM

## 2022-07-13 DIAGNOSIS — M25.561 CHRONIC PAIN OF BOTH KNEES: Primary | ICD-10-CM

## 2022-07-13 PROCEDURE — 97161 PT EVAL LOW COMPLEX 20 MIN: CPT | Mod: GP | Performed by: PHYSICAL THERAPIST

## 2022-07-13 PROCEDURE — 97112 NEUROMUSCULAR REEDUCATION: CPT | Mod: GP | Performed by: PHYSICAL THERAPIST

## 2022-07-13 PROCEDURE — 97110 THERAPEUTIC EXERCISES: CPT | Mod: GP | Performed by: PHYSICAL THERAPIST

## 2022-07-13 ASSESSMENT — ACTIVITIES OF DAILY LIVING (ADL)
STIFFNESS: THE SYMPTOM AFFECTS MY ACTIVITY MODERATELY
SWELLING: NOT ANSWERED
RISE FROM A CHAIR: ACTIVITY IS SOMEWHAT DIFFICULT
KNEE_ACTIVITY_OF_DAILY_LIVING_SUM: 32
LIMPING: NOT ANSWERED
HOW_WOULD_YOU_RATE_THE_OVERALL_FUNCTION_OF_YOUR_KNEE_DURING_YOUR_USUAL_DAILY_ACTIVITIES?: ABNORMAL
GIVING WAY, BUCKLING OR SHIFTING OF KNEE: THE SYMPTOM AFFECTS MY ACTIVITY SLIGHTLY
WALK: ACTIVITY IS MINIMALLY DIFFICULT
STAND: ACTIVITY IS NOT DIFFICULT
AS_A_RESULT_OF_YOUR_KNEE_INJURY,_HOW_WOULD_YOU_RATE_YOUR_CURRENT_LEVEL_OF_DAILY_ACTIVITY?: NEARLY NORMAL
KNEEL ON THE FRONT OF YOUR KNEE: ACTIVITY IS FAIRLY DIFFICULT
GO DOWN STAIRS: ACTIVITY IS VERY DIFFICULT
GO UP STAIRS: ACTIVITY IS VERY DIFFICULT
SIT WITH YOUR KNEE BENT: ACTIVITY IS NOT DIFFICULT
SQUAT: ACTIVITY IS FAIRLY DIFFICULT
WEAKNESS: THE SYMPTOM AFFECTS MY ACTIVITY MODERATELY
HOW_WOULD_YOU_RATE_THE_CURRENT_FUNCTION_OF_YOUR_KNEE_DURING_YOUR_USUAL_DAILY_ACTIVITIES_ON_A_SCALE_FROM_0_TO_100_WITH_100_BEING_YOUR_LEVEL_OF_KNEE_FUNCTION_PRIOR_TO_YOUR_INJURY_AND_0_BEING_THE_INABILITY_TO_PERFORM_ANY_OF_YOUR_USUAL_DAILY_ACTIVITIES?: 50
PAIN: THE SYMPTOM AFFECTS MY ACTIVITY MODERATELY

## 2022-07-13 NOTE — PROGRESS NOTES
[facility] Initial Evaluation -- Lower Extremity    Evaluation Date: July 13, 2022  Nazia Jasso is a 52 year old female with a Bilat Hips/knees condition.   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  Hips - Bilateral post lat hip/buttock - no radiation  Pain quality (sharp/shooting/stabbing/aching/burning/cramping):  Knees - aching burning.  Hips - burning    Present since (onset date): Knees - 3 mos ago, Hips 1-2 yrs.  MD referral 6-.    Symptoms (improving/unchaning/worsening):  Knees slightly worse and hips unchanging.      Symptoms commenced as a result of: Unknown hips and knees   Condition occurred in the following environment: unknown     Symptoms at onset: same  Paresthesia (yes/no):  no  Spinal history: yes - scoliosis, Chiro 1 x/month for back   Cough/Sneeze (pos/neg):  neg    Constant symptoms: none  Intermittent symptoms: knees and hips    Symptoms are worse with the following: Sometimes Sitting, Always Rising, Always Stairs, Always Squatting, Always Sleeping (prone/sup/side R/L) - SL L>R and Time of day - No effect   Symptoms are better with the following: Other - knees nothing, Hips change position and stretch    Continued use makes the pain (better/worse/no effect): NE    Disturbed night (yes/no): yes w/ hips      Pain at rest (yes/no):  occas  Site (back/hip/knee/ankle/foot):  hips    Other questions (swelling/clicking/locking/giving way/falling):  Crepitus R>L knee, R knee has felt like it is going to give way but has not x2     Previous  episodes: knees none, hips none  Previous treatments: none    Specific Questions:  General health (excellent/good/fair/poor):  Fair  Pertinent medical history includes: Depression, Overweight, Sleep disorder/apnea and Thyroid problems  Medications (nil/NSAIDS/analg/steroids/anticoag/other):  Other - Thyroid and Anti-depressants  Medical allergies:  PCN, erythromycin, Sulfa  Imaging (none/Xray/MRI/other):  Knees - neg  Recent or major surgery (yes/no):  None  Night pain (yes/no):  Yes but can reposition and go back to sleep  Accidents (yes/no):  no  Unexplained weight loss (yes/no):  no  Barriers at home: none  Other red flags: none    Sites for physical examination (back/hip/knee/ankle/foot/other): LB, Hips and knees    EXAMINATION    Posture:  Sitting (good/fair/poor): Fair    Correction of Posture (better/worse/no effect/NA): feels good  Standing (good/fair/poor): good - scoliosis(shlds L), mild genuvalgus bilat  Other observations:  Amb - good,    Neurological: (NA/motor/sensory/reflexes/dural): NA    Baselines (pain or functional activity): Pain rising after sitting, pain on stairs, and interrupted sleep d/t pain    Extremities (Hip / Knee / Ankle / Foot): Bilat knees    Movement Loss R L   Flexion     Extension Seated X X   Heelslide WNL WNL     Passive Movement (+/- over pressure)/(PDM/ERP):     PROM w/ OP bilat knees - WNL, slight ERP Flex R and Slight tight bilat w/ ext  Resisted Test Response (pain): NA D/t time  Other Tests:     Spine:  Movement loss:   Lumbar AROM   Flex To floor   Ext Mod decr   SG R Mod decr - mild LBP         L WNL  Effect of repeated movements:    EIS (back against table) 2 x10 - NE - stretch  NE  Incr ROM L-spine  Effect of static positioning: NA  Spine testing (not relevant/relevant/secondary problem): needs further assessment    Baseline Symptoms: NA - reassess L-spine then reassess knees  Repeated Tests Symptom Response Mechanical Response   Active/Passive movement, resisted test,  functional test During -  Produce, Abolish, Increase, Decrease, NE After -  Better, Worse, NB, NW, NE Effect -   ? or ? ROM, strength or key functional test No   Effect          Effect of static positioning                Provisional Classification (Extremity/Spine):  Extremity - Needs further assessment - Lumbar derangement vs knee derangement      Princicple of Management:   Education:  Posture - Neutral Spine, use of L-roll, affect of posture on spine/hips/pain, no couch, recliner, or propping up on pillows in bed, specificity of exer, spine as a source of extremity pain and HEP      Equipment provided:  None - Recommended using rolled towel for L-Roll whenever sitting.    Exercise and dosage:  See PTRx flowsheet    ASSESSMENT/PLAN:    Patient is a 52 year old female with bilateral hip and  knee complaints.    Patient has the following significant findings with corresponding treatment plan.                Diagnosis 1:  Chronic pain Bilat knees/Trochanteric Bursitis bilat hips  Pain -  hot/cold therapy, manual therapy, self management, education, directional preference exercise and home program  Decreased ROM/flexibility - manual therapy, therapeutic exercise and home program  Decreased strength - therapeutic exercise, therapeutic activities and home program  Impaired balance - neuro re-education, therapeutic activities and home program  Decreased function - therapeutic activities and home program  Impaired posture - neuro re-education and home program    Therapy Evaluation Codes:   1) History comprised of:   Personal factors that impact the plan of care:      Time since onset of symptoms.    Comorbidity factors that impact the plan of care are:      Depression, Overweight, Sleep disorder/apnea and Thyroid problems.     Medications impacting care: Anti-depressant and Thyroid.  2) Examination of Body Systems comprised of:   Body structures and functions that impact the plan of care:      Hip, Knee and Lumbar  spine.   Activity limitations that impact the plan of care are:      Stairs, Walking, Frequency and rising from sitting.  3) Clinical presentation characteristics are:   Evolving/Changing.  4) Decision-Making    Moderate complexity using standardized patient assessment instrument and/or measureable assessment of functional outcome.  Cumulative Therapy Evaluation is: Moderate complexity.    Previous and current functional limitations:  (See Goal Flow Sheet for this information)    Short term and Long term goals: (See Goal Flow Sheet for this information)     Communication ability:  Patient appears to be able to clearly communicate and understand verbal and written communication and follow directions correctly.  Treatment Explanation - The following has been discussed with the patient:   RX ordered/plan of care  Anticipated outcomes  Possible risks and side effects  This patient would benefit from PT intervention to resume normal activities.   Rehab potential is good.    Frequency:  1 X week, once daily  Duration:  for 8 weeks  Discharge Plan:  Achieve all LTG.  Independent in home treatment program.  Reach maximal therapeutic benefit.    Please refer to the daily flowsheet for treatment today, total treatment time and time spent performing 1:1 timed codes.

## 2022-07-27 ENCOUNTER — THERAPY VISIT (OUTPATIENT)
Dept: PHYSICAL THERAPY | Facility: CLINIC | Age: 52
End: 2022-07-27
Payer: COMMERCIAL

## 2022-07-27 DIAGNOSIS — M70.61 TROCHANTERIC BURSITIS OF BOTH HIPS: ICD-10-CM

## 2022-07-27 DIAGNOSIS — M25.561 CHRONIC PAIN OF BOTH KNEES: Primary | ICD-10-CM

## 2022-07-27 DIAGNOSIS — M70.62 TROCHANTERIC BURSITIS OF BOTH HIPS: ICD-10-CM

## 2022-07-27 DIAGNOSIS — M25.562 CHRONIC PAIN OF BOTH KNEES: Primary | ICD-10-CM

## 2022-07-27 DIAGNOSIS — G89.29 CHRONIC PAIN OF BOTH KNEES: Primary | ICD-10-CM

## 2022-07-27 PROCEDURE — 97112 NEUROMUSCULAR REEDUCATION: CPT | Mod: GP | Performed by: PHYSICAL THERAPIST

## 2022-07-27 PROCEDURE — 97110 THERAPEUTIC EXERCISES: CPT | Mod: GP | Performed by: PHYSICAL THERAPIST

## 2022-08-05 ENCOUNTER — THERAPY VISIT (OUTPATIENT)
Dept: PHYSICAL THERAPY | Facility: CLINIC | Age: 52
End: 2022-08-05
Payer: COMMERCIAL

## 2022-08-05 DIAGNOSIS — M70.62 TROCHANTERIC BURSITIS OF BOTH HIPS: ICD-10-CM

## 2022-08-05 DIAGNOSIS — G89.29 CHRONIC PAIN OF BOTH KNEES: Primary | ICD-10-CM

## 2022-08-05 DIAGNOSIS — M25.561 CHRONIC PAIN OF BOTH KNEES: Primary | ICD-10-CM

## 2022-08-05 DIAGNOSIS — M25.562 CHRONIC PAIN OF BOTH KNEES: Primary | ICD-10-CM

## 2022-08-05 DIAGNOSIS — M70.61 TROCHANTERIC BURSITIS OF BOTH HIPS: ICD-10-CM

## 2022-08-05 PROCEDURE — 97112 NEUROMUSCULAR REEDUCATION: CPT | Mod: GP | Performed by: PHYSICAL THERAPIST

## 2022-08-05 PROCEDURE — 97110 THERAPEUTIC EXERCISES: CPT | Mod: GP | Performed by: PHYSICAL THERAPIST

## 2022-08-17 ENCOUNTER — THERAPY VISIT (OUTPATIENT)
Dept: PHYSICAL THERAPY | Facility: CLINIC | Age: 52
End: 2022-08-17
Payer: COMMERCIAL

## 2022-08-17 DIAGNOSIS — M25.562 CHRONIC PAIN OF BOTH KNEES: Primary | ICD-10-CM

## 2022-08-17 DIAGNOSIS — M25.561 CHRONIC PAIN OF BOTH KNEES: Primary | ICD-10-CM

## 2022-08-17 DIAGNOSIS — M70.61 TROCHANTERIC BURSITIS OF BOTH HIPS: ICD-10-CM

## 2022-08-17 DIAGNOSIS — M70.62 TROCHANTERIC BURSITIS OF BOTH HIPS: ICD-10-CM

## 2022-08-17 DIAGNOSIS — G89.29 CHRONIC PAIN OF BOTH KNEES: Primary | ICD-10-CM

## 2022-08-17 PROCEDURE — 97112 NEUROMUSCULAR REEDUCATION: CPT | Mod: 59 | Performed by: PHYSICAL THERAPIST

## 2022-08-17 PROCEDURE — 97110 THERAPEUTIC EXERCISES: CPT | Mod: 59 | Performed by: PHYSICAL THERAPIST

## 2022-08-17 PROCEDURE — 97530 THERAPEUTIC ACTIVITIES: CPT | Mod: GP | Performed by: PHYSICAL THERAPIST

## 2022-08-24 ENCOUNTER — VIRTUAL VISIT (OUTPATIENT)
Dept: SLEEP MEDICINE | Facility: CLINIC | Age: 52
End: 2022-08-24
Payer: COMMERCIAL

## 2022-08-24 VITALS — BODY MASS INDEX: 37.13 KG/M2 | HEIGHT: 68 IN | WEIGHT: 245 LBS

## 2022-08-24 DIAGNOSIS — G47.33 OSA (OBSTRUCTIVE SLEEP APNEA): Primary | ICD-10-CM

## 2022-08-24 PROCEDURE — 99214 OFFICE O/P EST MOD 30 MIN: CPT | Mod: 95 | Performed by: PHYSICIAN ASSISTANT

## 2022-08-24 ASSESSMENT — SLEEP AND FATIGUE QUESTIONNAIRES
HOW LIKELY ARE YOU TO NOD OFF OR FALL ASLEEP IN A CAR, WHILE STOPPED FOR A FEW MINUTES IN TRAFFIC: WOULD NEVER DOZE
HOW LIKELY ARE YOU TO NOD OFF OR FALL ASLEEP WHILE SITTING AND READING: MODERATE CHANCE OF DOZING
HOW LIKELY ARE YOU TO NOD OFF OR FALL ASLEEP WHEN YOU ARE A PASSENGER IN A CAR FOR AN HOUR WITHOUT A BREAK: SLIGHT CHANCE OF DOZING
HOW LIKELY ARE YOU TO NOD OFF OR FALL ASLEEP WHILE SITTING QUIETLY AFTER LUNCH WITHOUT ALCOHOL: HIGH CHANCE OF DOZING
HOW LIKELY ARE YOU TO NOD OFF OR FALL ASLEEP WHILE WATCHING TV: SLIGHT CHANCE OF DOZING
HOW LIKELY ARE YOU TO NOD OFF OR FALL ASLEEP WHILE LYING DOWN TO REST IN THE AFTERNOON WHEN CIRCUMSTANCES PERMIT: HIGH CHANCE OF DOZING
HOW LIKELY ARE YOU TO NOD OFF OR FALL ASLEEP WHILE SITTING INACTIVE IN A PUBLIC PLACE: WOULD NEVER DOZE
HOW LIKELY ARE YOU TO NOD OFF OR FALL ASLEEP WHILE SITTING AND TALKING TO SOMEONE: WOULD NEVER DOZE

## 2022-08-24 ASSESSMENT — PAIN SCALES - GENERAL: PAINLEVEL: NO PAIN (0)

## 2022-08-24 NOTE — PROGRESS NOTES
Nazia is a 52 year old who is being evaluated via a billable video visit.      How would you like to obtain your AVS? MyChart  If the video visit is dropped, the invitation should be resent by: Send to e-mail at: [email or web address]  Will anyone else be joining your video visit     Vero Mead          Video-Visit Details    Video Start Time: 12:57 PM    Type of service:  Video Visit    Video End Time:1:14 PM    Originating Location (pt. Location): Home    Distant Location (provider location):  [facility]     Platform used for Video Visit: [facility]     Sleep Apnea - Follow-up Visit:    Impression/Plan:  Moderate obstructive sleep apnea, treated with a mandibular advancement device-  She presents with snoring, frequent nocturnal arousals and daytime sleepiness  We reviewed options.  She elected treatment with auto-CPAP 6-15 cm/H20.    Comprehensive DME order placed.    Nazia Jasso will follow up 2 months after starting CPAP.     30 minutes spent on day of encounter doing chart review,  history and exam, counseling, coordinating plan of care, documentation and further activities as noted above.      Bharat Huffman PA-C    History of Present Illness:  Chief Complaint   Patient presents with     Video Visit     Follow Up        Nazia Jasso presents for follow-up of their moderate sleep apnea, managed with a mandibular advancement device.     She initially presented with loud snoring, witnessed apnea, non-refreshing sleep, bruxism, daytime sleepiness (ESS 11), difficulty maintaining sleep, crowded oropharynx and occasional morning headaches.    3/26/2019 [place] Diagnostic Sleep Study (238.0 lbs) - AHI 18.0, RDI 26.4, Supine AHI 69.1, REM AHI -, Low O2 82.4%, Time Spent =88% 2.4 minutes / Time Spent =89% 5.0 minutes.    She elected treatment with mandibular advancement device.     HST with mandibular advancement device in place completed on  9/11/2019(244#)-AHI 3.7, supine AHI 3.2, lowest oxygen saturation was ~90%.    She presents today, because she continues to be sleepy.     What is your typical bedtime: 10-10:30 PM  How long does it take you to go to sleep: 5 minutes. She reports frequent nocturnal arousals (4-5 times) to switch positions. She does is snore with the mandibular advancement device in place and the snoring is moderate. There is no witnessed apnea.   What time do you typically get out of bed for the day: 9 AM (in the summer). 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   How many hours are you sleeping per night: 9.5 hours of sleep.   Do you feel well rested in the morning: no    She feels depression is under good control.     EPWORTH SLEEPINESS SCALE      [place] Sleepiness Scale ( QUYEN Vasquez  [id number]<br>ESS - [place]/English - Final version - 21 Nov 07 - [facility].) 8/24/2022   Sitting and reading Moderate chance of dozing   Watching TV Slight chance of dozing   Sitting, inactive in a public place (e.g. a theatre or a meeting) Would never doze   As a passenger in a car for an hour without a break Slight chance of dozing   Lying down to rest in the afternoon when circumstances permit High chance of dozing   Sitting and talking to someone Would never doze   Sitting quietly after a lunch without alcohol High chance of dozing   In a car, while stopped for a few minutes in traffic Would never doze   [place] Score (MC) 10   [place] Score (Sleep) 10       INSOMNIA SEVERITY INDEX (TANNER)      Insomnia Severity Index (TANNER) 8/24/2022   Difficulty falling asleep 0   Difficulty staying asleep 2   Problems waking up too early 2   How SATISFIED/DISSATISFIED are you with your CURRENT sleep pattern? 3   How NOTICEABLE to others do you think your sleep problem is in terms of impairing the quality of your life? 2   How WORRIED/DISTRESSED are you about your current sleep problem? 2   To what extent do you consider your sleep  "problem to INTERFERE with your daily functioning (e.g. daytime fatigue, mood, ability to function at work/daily chores, concentration, memory, mood, etc.) CURRENTLY? 3   TANNER Total Score 14       Guidelines for Scoring/Interpretation:  Total score categories:  0-7 = No clinically significant insomnia   8-14 = Subthreshold insomnia   15-21 = Clinical insomnia (moderate severity)  22-28 = Clinical insomnia (severe)  Used via courtesy of www.[employer].va.gov with permission from Daniele Aguila PhD., [facility]        Past medical/surgical history, family history, social history, medications and allergies were reviewed.        Problem List:  Patient Active Problem List    Diagnosis Date Noted     Chronic pain of both knees 07/13/2022     Priority: Medium     Trochanteric bursitis of both hips 07/13/2022     Priority: Medium     Obesity (BMI 35.0-39.9) with comorbidity (H) 11/04/2019     Priority: Medium     KIYA (obstructive sleep apnea)- moderate (AHI 18) 03/27/2019     Priority: Medium     3/26/2019 [place] Diagnostic Sleep Study (238.0 lbs) - AHI 18.0, RDI 26.4, Supine AHI 69.1, REM AHI -, Low O2 82.4%, Time Spent ?88% 2.4 minutes / Time Spent ?89% 5.0 minutes.    HST with mandibular advancement device in place completed on 9/11/2019(244#)-AHI 3.7, supine AHI 3.2, lowest oxygen saturation was ~90%.       ASCUS of cervix with negative high risk HPV 06/17/2016     Priority: Medium     20's - Pt reported abnormal pap that was normal on follow up.  5/8/03 NIL  8/26/04 NIL  7/24/08 NIL  6/30/11 NIL  6/17/16 ASCUS/neg HR HPV.  Plan: cotest in 3 years.  1/21/2020 NIL pap, neg HR HPV. Routine screening       Family history of thyroid problem 11/11/2011     Priority: Medium     Sister is hypothyroid       Mild major depression (H) 09/08/2010     Priority: Medium     CARDIOVASCULAR SCREENING; LDL GOAL LESS THAN 160 05/09/2010     Priority: Medium        Ht 1.727 m (5' 8\")   Wt 111.1 kg (245 lb)   BMI 37.25 kg/m    "

## 2022-08-29 NOTE — NURSING NOTE
DME orders have been automatically faxed to [facility]. My Chart message will be sent to patient:  Nazia STORM Cameron Jasso  [address] [place] [address]       2022      Dear Ms. Cameron Jasso,      Your provider has placed an order for you to get a new PAP device. Your medical equipment company will try to contact you as soon as possible to schedule your set up (Please be advised, due to a shortage of machines, this may take longer to receive call). Once you know the date of this appointment, please contact our office to schedule a follow up visit with your provider. This appointment should be scheduled 60 days from the day that you will be set up on your new device.     [facility] [employer] contact information:     [facility]: [phone]  [place]: [phone]  [place]: [phone]  [phone]  [place]: [phone]  [place]: [phone]        [facility]   Schedule line: [phone]      Sincerely,          Constance Fountain, [facility]  Sleep Medicine

## 2022-08-30 ENCOUNTER — TELEPHONE (OUTPATIENT)
Dept: SLEEP MEDICINE | Facility: CLINIC | Age: 52
End: 2022-08-30

## 2022-08-30 DIAGNOSIS — G47.33 OSA (OBSTRUCTIVE SLEEP APNEA): Primary | ICD-10-CM

## 2022-08-30 NOTE — TELEPHONE ENCOUNTER
Informed patient of CPAP shortage and she wants to get on the wait list to be setup in [facility] showroom.

## 2022-08-31 ENCOUNTER — THERAPY VISIT (OUTPATIENT)
Dept: PHYSICAL THERAPY | Facility: CLINIC | Age: 52
End: 2022-08-31
Payer: COMMERCIAL

## 2022-08-31 DIAGNOSIS — M70.61 TROCHANTERIC BURSITIS OF BOTH HIPS: ICD-10-CM

## 2022-08-31 DIAGNOSIS — M70.62 TROCHANTERIC BURSITIS OF BOTH HIPS: ICD-10-CM

## 2022-08-31 DIAGNOSIS — M25.561 CHRONIC PAIN OF BOTH KNEES: Primary | ICD-10-CM

## 2022-08-31 DIAGNOSIS — M25.562 CHRONIC PAIN OF BOTH KNEES: Primary | ICD-10-CM

## 2022-08-31 DIAGNOSIS — G89.29 CHRONIC PAIN OF BOTH KNEES: Primary | ICD-10-CM

## 2022-08-31 PROCEDURE — 97110 THERAPEUTIC EXERCISES: CPT | Mod: GP | Performed by: PHYSICAL THERAPIST

## 2022-08-31 PROCEDURE — 97530 THERAPEUTIC ACTIVITIES: CPT | Mod: GP | Performed by: PHYSICAL THERAPIST

## 2022-08-31 PROCEDURE — 97112 NEUROMUSCULAR REEDUCATION: CPT | Mod: GP | Performed by: PHYSICAL THERAPIST

## 2022-09-13 ENCOUNTER — TELEPHONE (OUTPATIENT)
Dept: SLEEP MEDICINE | Facility: CLINIC | Age: 52
End: 2022-09-13

## 2022-09-13 DIAGNOSIS — G47.33 OSA (OBSTRUCTIVE SLEEP APNEA): Primary | ICD-10-CM

## 2022-09-15 NOTE — TELEPHONE ENCOUNTER
RECORDS RECEIVED FROM: internal    DATE RECEIVED: 12.7.22    NOTES (FOR ALL VISITS) STATUS DETAILS   OFFICE NOTES from referring provider internal  Sarah Gregorio MD   OFFICE NOTES from other specialist internal  Dr Zambrano     MEDICATION LIST internal       LABS     DIABETES: HBGA1C, CREATININE, FASTING LIPIDS, MICROALBUMIN URINE, POTASSIUM, TSH, T4    THYROID: TSH, T4, CBC, THYRODLONULIN, TOTAL T3, FREE T4, CALCITONIN, CEA internal  TSH/T4- 4.14.22  T3- 4.14.22  Lipid- 4.14.22  CBC- 4.14.22  Vitamin D- 4.14.22

## 2022-09-26 ENCOUNTER — TELEPHONE (OUTPATIENT)
Dept: SLEEP MEDICINE | Facility: CLINIC | Age: 52
End: 2022-09-26

## 2022-09-26 DIAGNOSIS — G47.33 OSA (OBSTRUCTIVE SLEEP APNEA): Primary | ICD-10-CM

## 2022-09-27 ENCOUNTER — TELEPHONE (OUTPATIENT)
Dept: SLEEP MEDICINE | Facility: CLINIC | Age: 52
End: 2022-09-27

## 2022-09-27 DIAGNOSIS — G47.33 OSA (OBSTRUCTIVE SLEEP APNEA): Primary | ICD-10-CM

## 2022-10-01 DIAGNOSIS — R94.6 ABNORMAL FINDING ON THYROID FUNCTION TEST: ICD-10-CM

## 2022-10-06 RX ORDER — LIOTHYRONINE SODIUM 5 UG/1
5 TABLET ORAL DAILY
Qty: 30 TABLET | Refills: 3 | Status: SHIPPED | OUTPATIENT
Start: 2022-10-06 | End: 2022-12-07

## 2022-10-06 NOTE — TELEPHONE ENCOUNTER
liothyronine (CYTOMEL) 5 MCG tablet  Last Written Prescription Date:   6/8/2022  Last Fill Quantity: 30,   # refills: 2  Last Office Visit :  4/28/2021  Future Office visit:  12/7/2022    Routing refill request to provider for review/approval because:  Last Filled by Dr. Zambrano  Pt has an updated visit in December 2022.  Refer to Advice Provider for review       Yajaira Chavira RN  Central Triage Red Flags/Med Refills

## 2022-10-10 ENCOUNTER — DOCUMENTATION ONLY (OUTPATIENT)
Dept: SLEEP MEDICINE | Facility: CLINIC | Age: 52
End: 2022-10-10
Payer: COMMERCIAL

## 2022-10-10 DIAGNOSIS — G47.33 OSA (OBSTRUCTIVE SLEEP APNEA): Primary | Chronic | ICD-10-CM

## 2022-10-10 NOTE — TELEPHONE ENCOUNTER
Patient called and would like an update on the status on prescription. Writer informed patient of 72 hour wait for refill requests.

## 2022-10-10 NOTE — PROGRESS NOTES
Patient was offered choice of vendor and chose [facility].  Patient Nazia Jasso was set up at [place]  on October 10, 2022. Patient received a Resmed Airsense 11 Pressures were set at 6-15 cm H2O.   Patient s ramp is 5 cm H2O for Auto and FLEX/EPR is 2.  Patient received a Resmed Mask name: AIRFIT F20  Full Face mask size Medium, heated tubing and heated humidifier.  Patient does need to meet compliance.   Carlie Rocha

## 2022-10-13 ENCOUNTER — DOCUMENTATION ONLY (OUTPATIENT)
Dept: SLEEP MEDICINE | Facility: CLINIC | Age: 52
End: 2022-10-13
Payer: COMMERCIAL

## 2022-10-13 DIAGNOSIS — G47.33 OSA (OBSTRUCTIVE SLEEP APNEA): Primary | Chronic | ICD-10-CM

## 2022-10-13 NOTE — PROGRESS NOTES
3 day Sleep therapy management telephone visit    Diagnostic AHI: 18  PSG    Confirmed with patient at time of call- N/A Patient is still interested in STM service       Unable to leave message.        Objective data     Order Settings for PAP  CPAP min 6    CPAP max 15             Device settings from machine CPAP min 6.0     CPAP max 15.0           EPR Setting TWO    RESMED soft response  OFF     Assessment: Nighty usage most nights over four hours      Action plan: Patient to have 14 day STM visit. Patient has a follow up visit scheduled:   no    Replacement device: No  STM ordered by provider: Yes     Total time spent on accessing and  interpreting remote patient PAP therapy data  10 minutes    Total time spent counseling, coaching  and reviewing PAP therapy data with patient  1 minutes    [id number] no

## 2022-10-23 ENCOUNTER — HEALTH MAINTENANCE LETTER (OUTPATIENT)
Age: 52
End: 2022-10-23

## 2022-10-25 ENCOUNTER — DOCUMENTATION ONLY (OUTPATIENT)
Dept: SLEEP MEDICINE | Facility: CLINIC | Age: 52
End: 2022-10-25
Payer: COMMERCIAL

## 2022-10-25 DIAGNOSIS — G47.33 OSA (OBSTRUCTIVE SLEEP APNEA): Primary | Chronic | ICD-10-CM

## 2022-11-22 ENCOUNTER — DOCUMENTATION ONLY (OUTPATIENT)
Dept: SLEEP MEDICINE | Facility: CLINIC | Age: 52
End: 2022-11-22
Payer: COMMERCIAL

## 2022-11-22 DIAGNOSIS — G47.33 OSA (OBSTRUCTIVE SLEEP APNEA): Primary | Chronic | ICD-10-CM

## 2022-11-22 NOTE — PROGRESS NOTES
30 DAY STM VISIT    Diagnostic AHI: 18  PSG    PAP settings:  CPAP MIN CPAP MAX 95TH % PRESSURE EPR RESMED SOFT RESPONSE SETTING   6.0 cm  H20 15.0 cm  H20 12.6 cm  H20  TWO OFF     Device type: Auto-CPAP  Mask type:  Full Face Mask    Objective measures: 14 day rolling measures:    COMPLIANCE LEAK AHI AVERAGE USE IN MINUTES   100 % 2.96 2.63 492   GOAL >70% GOAL < 24 LPM GOAL <5 GOAL >240        Assessment: Pt meeting objective benchmarks.     Message left for patient to return call . Sent my chart message letting pt know to sched f/u appt  Action plan: waiting for patient to return call.  and pt to have 6 month [facility] visit  Patient has not scheduled a follow up visit with Tameka Machuca.     Total time spent on accessing and interpreting remote patient PAP therapy data  10 minutes    Total time spent counseling, coaching  and reviewing PAP therapy data with patient  1 minutes     [id number] this call  [id number] no  at 3 or 14 day [facility]

## 2022-12-06 ENCOUNTER — TELEPHONE (OUTPATIENT)
Dept: ENDOCRINOLOGY | Facility: CLINIC | Age: 52
End: 2022-12-06

## 2022-12-07 ENCOUNTER — OFFICE VISIT (OUTPATIENT)
Dept: ENDOCRINOLOGY | Facility: CLINIC | Age: 52
End: 2022-12-07
Payer: COMMERCIAL

## 2022-12-07 ENCOUNTER — PRE VISIT (OUTPATIENT)
Dept: ENDOCRINOLOGY | Facility: CLINIC | Age: 52
End: 2022-12-07

## 2022-12-07 ENCOUNTER — LAB (OUTPATIENT)
Dept: LAB | Facility: CLINIC | Age: 52
End: 2022-12-07
Payer: COMMERCIAL

## 2022-12-07 VITALS
SYSTOLIC BLOOD PRESSURE: 127 MMHG | HEART RATE: 63 BPM | DIASTOLIC BLOOD PRESSURE: 48 MMHG | BODY MASS INDEX: 38.32 KG/M2 | WEIGHT: 252 LBS

## 2022-12-07 DIAGNOSIS — R53.82 CHRONIC FATIGUE: ICD-10-CM

## 2022-12-07 DIAGNOSIS — Z83.49 FAMILY HISTORY OF THYROID PROBLEM: ICD-10-CM

## 2022-12-07 DIAGNOSIS — R94.6 ABNORMAL FINDING ON THYROID FUNCTION TEST: Primary | ICD-10-CM

## 2022-12-07 DIAGNOSIS — R94.6 ABNORMAL FINDING ON THYROID FUNCTION TEST: ICD-10-CM

## 2022-12-07 LAB
T3 SERPL-MCNC: 123 NG/DL (ref 85–202)
T4 FREE SERPL-MCNC: 1.12 NG/DL (ref 0.9–1.7)
TSH SERPL DL<=0.005 MIU/L-ACNC: 2.13 UIU/ML (ref 0.3–4.2)

## 2022-12-07 PROCEDURE — 84443 ASSAY THYROID STIM HORMONE: CPT | Performed by: PATHOLOGY

## 2022-12-07 PROCEDURE — 84480 ASSAY TRIIODOTHYRONINE (T3): CPT | Mod: 90 | Performed by: PATHOLOGY

## 2022-12-07 PROCEDURE — 99000 SPECIMEN HANDLING OFFICE-LAB: CPT | Performed by: PATHOLOGY

## 2022-12-07 PROCEDURE — 99215 OFFICE O/P EST HI 40 MIN: CPT | Performed by: STUDENT IN AN ORGANIZED HEALTH CARE EDUCATION/TRAINING PROGRAM

## 2022-12-07 PROCEDURE — 36415 COLL VENOUS BLD VENIPUNCTURE: CPT | Performed by: PATHOLOGY

## 2022-12-07 PROCEDURE — 84439 ASSAY OF FREE THYROXINE: CPT | Performed by: PATHOLOGY

## 2022-12-07 RX ORDER — LIOTHYRONINE SODIUM 5 UG/1
5 TABLET ORAL DAILY
Qty: 90 TABLET | Refills: 3 | Status: SHIPPED | OUTPATIENT
Start: 2022-12-07 | End: 2024-02-12

## 2022-12-07 RX ORDER — LEVOTHYROXINE SODIUM 50 UG/1
50 TABLET ORAL DAILY
Qty: 90 TABLET | Refills: 5 | Status: SHIPPED | OUTPATIENT
Start: 2022-12-07 | End: 2023-12-27

## 2022-12-07 ASSESSMENT — PAIN SCALES - GENERAL: PAINLEVEL: NO PAIN (0)

## 2022-12-07 NOTE — LETTER
12/7/2022       RE: Nazia Jasso  [address] [place] [address]     Dear Colleague,    Thank you for referring your patient, Nazia Jasso, to the [facility] [place] at [facility]. Please see a copy of my visit note below.    Endocrinology Clinic Visit 12/7/2022    NAME:  Nazia Jasso  PCP:  Sarah Gregorio  MRN:  [id number]  Reason for Consult:  hypothyroidism  Requesting Provider:  Fidencio Zambrano    Chief Complaint     Chief Complaint   Patient presents with     Thyroid Problem       History of Present Illness     Nazia Jasso is a 52 year old female who is seen in clinic for hypothyroidism    She has been struggling with fatigue for few years. She had normal TFT over the years. She was seen by Dr. Zambrano on 4/2021, she had normal TSH and FT4, low FT3. She was started on LT4 50 mcg daily and liothyronine 5 mg daily. Most recent TFT 4/2022 wnl.     She continues to have fatigue despite thyroid hormone replacement. She said maybe a slight improvement. She used to be freezing with severe cold intolerance which improved with thyroid treatment. She said she is also going through menopause, no periods for the past year and having hot flashes.  She has a stable wt over the past 5 years and having difficullty loosing wt.     She has KIYA, she started on CPAP 2 month and still no improvement with her fatigue. ROS is positive for diffuse pain and had a work up by rheumatology which was negative; probably fibromyalgia. Occasional dizziness and lhd which attributed to her reading glasses. No muscle weakness. She said she always been an easy bruiser. No purple stretch marks. No facial rounding.     Her sister has hypothyroidism, levothyroxine did not help, synthroid worked for her.    Family hx: hypothyroidism in her sister . Father and brother  hyperthyroidism.   Social: she     Problem List     Patient Active Problem List   Diagnosis     CARDIOVASCULAR SCREENING; LDL GOAL LESS THAN 160     Mild major depression (H)     Family history of thyroid problem     ASCUS of cervix with negative high risk HPV     KIYA (obstructive sleep apnea)- moderate (AHI 18)     Obesity (BMI 35.0-39.9) with comorbidity (H)     Chronic pain of both knees     Trochanteric bursitis of both hips        Medications     Current Outpatient Medications   Medication     Cholecalciferol (VITAMIN D-3) 5000 units TABS     citalopram (CELEXA) 20 MG tablet     levothyroxine (SYNTHROID/LEVOTHROID) 50 MCG tablet     liothyronine (CYTOMEL) 5 MCG tablet     Multiple Vitamin (MULTIVITAMINS PO)     cyclobenzaprine (FLEXERIL) 5 MG tablet     No current facility-administered medications for this visit.        Allergies     Allergies   Allergen Reactions     Septra Ds [Sulfamethoxazole W-Trimethoprim] Hives     Happened at end of course from 12/01/2011-12/     Amoxicillin      Erythromycin      hives     Penicillins      hives       Medical / Surgical History     Past Medical History:   Diagnosis Date     ASCUS of cervix with negative high risk HPV 6/17/16 6/17/16 ASCUS/neg HR HPV.      Depressive disorder 1997     Depressive disorder, not elsewhere classified     Depression (non-psychotic)     Endometriosis, site unspecified     Endometriosis     Female infertility of unspecified origin     Female infertility     Thyroid disease      No past surgical history on file.    Social History     Social History     Socioeconomic History     Marital status:      Spouse name: james     Number of children: 0     Years of education: 18     Highest education level: Not on file   Occupational History     Occupation: manager     Employer: [employer]   Tobacco Use     Smoking status: Never     Smokeless tobacco: Never   Vaping Use     Vaping Use: Never used   Substance and Sexual  Activity     Alcohol use: Yes     Comment: 1-2 drink per month     Drug use: No     Sexual activity: Yes     Partners: Male     Birth control/protection: Condom   Other Topics Concern     Parent/sibling w/ CABG, MI or angioplasty before 65F 55M? No   Social History Narrative     Not on file     Social Determinants of Health     Financial Resource Strain: Not on file   Food Insecurity: Not on file   Transportation Needs: Not on file   Physical Activity: Not on file   Stress: Not on file   Social Connections: Not on file   Intimate Partner Violence: Not on file   Housing Stability: Not on file       Family History     Family History   Problem Relation Age of Onset     C.A.D. Paternal Grandfather      Cerebrovascular Disease Paternal Grandfather      C.A.D. Maternal Grandfather      Hypertension Mother      Hypertension Maternal Grandmother      Prostate Cancer Father      Thyroid Disease Sister      Hypothyroidism Sister      No Known Problems Brother      Diabetes No family hx of      Breast Cancer No family hx of      Cancer - colorectal No family hx of        ROS     12 ROS completed, pertinent positive and negative in HPI    Physical Exam   /48 (BP Location: Right arm, Patient Position: Sitting, Cuff Size: Adult Regular)   Pulse 63   Wt 114.3 kg (252 lb)   BMI 38.32 kg/m       General: Comfortable, no obvious distress, normal body habitus  Eyes: Sclera anicteric, moist conjunctiva  HENT: Atraumatic, oropharynx clear, moist mucous membranes with no mucosal ulcerations  Neck: Trachea midline, supple. Thyroid: Thyroid is normal in size and texture  CV: normal rate.   Resp:  good effort, no evidence of loud wheezing  Abdomen:  obese, non distended.   Skin: No rashes, lesions, or subcutaneous nodules on exposed skin.   Psych: Alert and oriented x 3. Appropriate affect, good insight  Extremities: No peripheral edema  Musculoskeletal: Appropriate muscle bulk and strength  Neuro: Moves all four extremities. No  focal deficits on limited exam. Gait normal.     Labs/Imaging     Pertinent Labs were reviewed and updated in EPIC and discussed briefly.  Radiology Results were  reviewed and updated in EPIC and discussed briefly.    Summary of recent findings:   Lab Results   Component Value Date    A1C 5.0 03/09/2021       TSH   Date Value Ref Range Status   12/07/2022 2.13 0.30 - 4.20 uIU/mL Final   04/14/2022 1.87 0.40 - 4.00 mU/L Final   05/28/2021 2.42 0.40 - 4.00 mU/L Final   04/21/2021 2.79 0.40 - 4.00 mU/L Final   03/09/2021 3.69 0.40 - 4.00 mU/L Final   01/11/2019 3.16 0.40 - 4.00 mU/L Final   06/09/2016 3.22 0.40 - 4.00 mU/L Final     T4 Free   Date Value Ref Range Status   05/28/2021 1.12 0.76 - 1.46 ng/dL Final   04/21/2021 1.05 0.76 - 1.46 ng/dL Final   03/09/2021 0.91 0.76 - 1.46 ng/dL Final   01/11/2019 1.04 0.76 - 1.46 ng/dL Final     Free T4   Date Value Ref Range Status   12/07/2022 1.12 0.90 - 1.70 ng/dL Final   04/14/2022 1.09 0.76 - 1.46 ng/dL Final       Creatinine   Date Value Ref Range Status   03/09/2021 0.63 0.52 - 1.04 mg/dL Final       Recent Labs   Lab Test 04/14/22  0813 01/11/19  1511   CHOL 159 159   HDL 52 57   LDL 93 88   TRIG 72 70       No results found for: [id number], [id number], [id number]    I personally reviewed the patient's outside records from [facility] EMR and Care Everywhere. Summary of pertinent findings in HPI.    Impression / Plan     1. Hypothyroidism  She never really had a period of overt hypothyroidism. All her TSH and FT4 in records wnl. She had low free t3 on 3/2021 and was started on thyroid replacement based on that. Today we reviewed general information about the thyroid and hypothyroidism, symptoms of hypothyroidism and what to expect with thyroid hormone replacement. She was wondering if increasing her dosing will help with fatigue; she does have normal TFT on 4/2022 thus her fatigue is not due to a thyroid problem and there is no indication to increase her dose. We discussed  the issue with dosing based on symptoms and the risk of hyperthyroidism. We will plan on getting TFT today, we could target a TSH< 2.5 but within normal range.    2. Chronic fatigue  No other significant symptoms. Unlikely to be due to an underlying endocrinopathy. New diagnosis of possible fibromyalgia.      3. Obesity, BMI 38  She has been struggling with inability to loose wt for 5 years. We briefly discussed lifestyle changes and medical approach for wt management. I also offered her nutrition referral. She could also establish with bariatric clinic for wt management.      Test and/or medications prescribed today:  Orders Placed This Encounter   Procedures     TSH     T4 free     T3 total       Follow up: 6 month      Augie Trinidad MD  Endocrinology, Diabetes and Metabolism  [facility]    60 minutes spent on the date of the encounter doing chart review, history and exam, documentation and further activities per the note

## 2022-12-07 NOTE — NURSING NOTE
"Chief Complaint   Patient presents with     Thyroid Problem     Vital signs:      BP: 127/48 Pulse: 63             Weight: 114.3 kg (252 lb)  Estimated body mass index is 38.32 kg/m  as calculated from the following:    Height as of 8/24/22: 1.727 m (5' 8\").    Weight as of this encounter: 114.3 kg (252 lb).          "

## 2022-12-07 NOTE — PROGRESS NOTES
Endocrinology Clinic Visit 12/7/2022    NAME:  Nazia Jasso  PCP:  Sarah Gregorio  MRN:  [id number]  Reason for Consult:  hypothyroidism  Requesting Provider:  Fidencio Zambrano    Chief Complaint     Chief Complaint   Patient presents with     Thyroid Problem       History of Present Illness     Nazia Jasso is a 52 year old female who is seen in clinic for hypothyroidism    She has been struggling with fatigue for few years. She had normal TFT over the years. She was seen by Dr. Zambrano on 4/2021, she had normal TSH and FT4, low FT3. She was started on LT4 50 mcg daily and liothyronine 5 mg daily. Most recent TFT 4/2022 wnl.     She continues to have fatigue despite thyroid hormone replacement. She said maybe a slight improvement. She used to be freezing with severe cold intolerance which improved with thyroid treatment. She said she is also going through menopause, no periods for the past year and having hot flashes.  She has a stable wt over the past 5 years and having difficullty loosing wt.     She has KIYA, she started on CPAP 2 month and still no improvement with her fatigue. ROS is positive for diffuse pain and had a work up by rheumatology which was negative; probably fibromyalgia. Occasional dizziness and lhd which attributed to her reading glasses. No muscle weakness. She said she always been an easy bruiser. No purple stretch marks. No facial rounding.     Her sister has hypothyroidism, levothyroxine did not help, synthroid worked for her.    Family hx: hypothyroidism in her sister . Father and brother hyperthyroidism.   Social: she     Problem List     Patient Active Problem List   Diagnosis     CARDIOVASCULAR SCREENING; LDL GOAL LESS THAN 160     Mild major depression (H)     Family history of thyroid problem     ASCUS of cervix with negative high risk HPV     KIYA (obstructive sleep apnea)- moderate (AHI 18)     Obesity (BMI 35.0-39.9) with comorbidity  (H)     Chronic pain of both knees     Trochanteric bursitis of both hips        Medications     Current Outpatient Medications   Medication     Cholecalciferol (VITAMIN D-3) 5000 units TABS     citalopram (CELEXA) 20 MG tablet     levothyroxine (SYNTHROID/LEVOTHROID) 50 MCG tablet     liothyronine (CYTOMEL) 5 MCG tablet     Multiple Vitamin (MULTIVITAMINS PO)     cyclobenzaprine (FLEXERIL) 5 MG tablet     No current facility-administered medications for this visit.        Allergies     Allergies   Allergen Reactions     Septra Ds [Sulfamethoxazole W-Trimethoprim] Hives     Happened at end of course from 12/01/2011-12/     Amoxicillin      Erythromycin      hives     Penicillins      hives       Medical / Surgical History     Past Medical History:   Diagnosis Date     ASCUS of cervix with negative high risk HPV 6/17/16 6/17/16 ASCUS/neg HR HPV.      Depressive disorder 1997     Depressive disorder, not elsewhere classified     Depression (non-psychotic)     Endometriosis, site unspecified     Endometriosis     Female infertility of unspecified origin     Female infertility     Thyroid disease      No past surgical history on file.    Social History     Social History     Socioeconomic History     Marital status:      Spouse name: james     Number of children: 0     Years of education: 18     Highest education level: Not on file   Occupational History     Occupation: manager     Employer: [employer]   Tobacco Use     Smoking status: Never     Smokeless tobacco: Never   Vaping Use     Vaping Use: Never used   Substance and Sexual Activity     Alcohol use: Yes     Comment: 1-2 drink per month     Drug use: No     Sexual activity: Yes     Partners: Male     Birth control/protection: Condom   Other Topics Concern     Parent/sibling w/ CABG, MI or angioplasty before 65F 55M? No   Social History Narrative     Not on file     Social Determinants of Health     Financial Resource Strain: Not on  file   Food Insecurity: Not on file   Transportation Needs: Not on file   Physical Activity: Not on file   Stress: Not on file   Social Connections: Not on file   Intimate Partner Violence: Not on file   Housing Stability: Not on file       Family History     Family History   Problem Relation Age of Onset     C.A.D. Paternal Grandfather      Cerebrovascular Disease Paternal Grandfather      C.A.D. Maternal Grandfather      Hypertension Mother      Hypertension Maternal Grandmother      Prostate Cancer Father      Thyroid Disease Sister      Hypothyroidism Sister      No Known Problems Brother      Diabetes No family hx of      Breast Cancer No family hx of      Cancer - colorectal No family hx of        ROS     12 ROS completed, pertinent positive and negative in HPI    Physical Exam   /48 (BP Location: Right arm, Patient Position: Sitting, Cuff Size: Adult Regular)   Pulse 63   Wt 114.3 kg (252 lb)   BMI 38.32 kg/m       General: Comfortable, no obvious distress, normal body habitus  Eyes: Sclera anicteric, moist conjunctiva  HENT: Atraumatic, oropharynx clear, moist mucous membranes with no mucosal ulcerations  Neck: Trachea midline, supple. Thyroid: Thyroid is normal in size and texture  CV: normal rate.   Resp:  good effort, no evidence of loud wheezing  Abdomen:  obese, non distended.   Skin: No rashes, lesions, or subcutaneous nodules on exposed skin.   Psych: Alert and oriented x 3. Appropriate affect, good insight  Extremities: No peripheral edema  Musculoskeletal: Appropriate muscle bulk and strength  Neuro: Moves all four extremities. No focal deficits on limited exam. Gait normal.     Labs/Imaging     Pertinent Labs were reviewed and updated in EPIC and discussed briefly.  Radiology Results were  reviewed and updated in EPIC and discussed briefly.    Summary of recent findings:   Lab Results   Component Value Date    A1C 5.0 03/09/2021       TSH   Date Value Ref Range Status   12/07/2022 2.13  0.30 - 4.20 uIU/mL Final   04/14/2022 1.87 0.40 - 4.00 mU/L Final   05/28/2021 2.42 0.40 - 4.00 mU/L Final   04/21/2021 2.79 0.40 - 4.00 mU/L Final   03/09/2021 3.69 0.40 - 4.00 mU/L Final   01/11/2019 3.16 0.40 - 4.00 mU/L Final   06/09/2016 3.22 0.40 - 4.00 mU/L Final     T4 Free   Date Value Ref Range Status   05/28/2021 1.12 0.76 - 1.46 ng/dL Final   04/21/2021 1.05 0.76 - 1.46 ng/dL Final   03/09/2021 0.91 0.76 - 1.46 ng/dL Final   01/11/2019 1.04 0.76 - 1.46 ng/dL Final     Free T4   Date Value Ref Range Status   12/07/2022 1.12 0.90 - 1.70 ng/dL Final   04/14/2022 1.09 0.76 - 1.46 ng/dL Final       Creatinine   Date Value Ref Range Status   03/09/2021 0.63 0.52 - 1.04 mg/dL Final       Recent Labs   Lab Test 04/14/22  0813 01/11/19  1511   CHOL 159 159   HDL 52 57   LDL 93 88   TRIG 72 70       No results found for: [id number], [id number], [id number]    I personally reviewed the patient's outside records from [facility] EMR and Care Everywhere. Summary of pertinent findings in HPI.    Impression / Plan     1. Hypothyroidism  She never really had a period of overt hypothyroidism. All her TSH and FT4 in records wnl. She had low free t3 on 3/2021 and was started on thyroid replacement based on that. Today we reviewed general information about the thyroid and hypothyroidism, symptoms of hypothyroidism and what to expect with thyroid hormone replacement. She was wondering if increasing her dosing will help with fatigue; she does have normal TFT on 4/2022 thus her fatigue is not due to a thyroid problem and there is no indication to increase her dose. We discussed the issue with dosing based on symptoms and the risk of hyperthyroidism. We will plan on getting TFT today, we could target a TSH< 2.5 but within normal range.    2. Chronic fatigue  No other significant symptoms. Unlikely to be due to an underlying endocrinopathy. New diagnosis of possible fibromyalgia.      3. Obesity, BMI 38  She has been struggling with  inability to loose wt for 5 years. We briefly discussed lifestyle changes and medical approach for wt management. I also offered her nutrition referral. She could also establish with bariatric clinic for wt management.      Test and/or medications prescribed today:  Orders Placed This Encounter   Procedures     TSH     T4 free     T3 total         Follow up: 6 month      Augie Trinidad MD  Endocrinology, Diabetes and Metabolism  [facility]    60 minutes spent on the date of the encounter doing chart review, history and exam, documentation and further activities per the note

## 2022-12-22 ENCOUNTER — LAB (OUTPATIENT)
Dept: LAB | Facility: CLINIC | Age: 52
End: 2022-12-22
Payer: COMMERCIAL

## 2022-12-22 ENCOUNTER — VIRTUAL VISIT (OUTPATIENT)
Dept: FAMILY MEDICINE | Facility: CLINIC | Age: 52
End: 2022-12-22
Payer: COMMERCIAL

## 2022-12-22 ENCOUNTER — NURSE TRIAGE (OUTPATIENT)
Dept: FAMILY MEDICINE | Facility: CLINIC | Age: 52
End: 2022-12-22

## 2022-12-22 DIAGNOSIS — R35.0 URINARY FREQUENCY: Primary | ICD-10-CM

## 2022-12-22 DIAGNOSIS — R35.0 URINARY FREQUENCY: ICD-10-CM

## 2022-12-22 LAB
ALBUMIN UR-MCNC: NEGATIVE MG/DL
APPEARANCE UR: ABNORMAL
BACTERIA #/AREA URNS HPF: ABNORMAL /HPF
BILIRUB UR QL STRIP: NEGATIVE
COLOR UR AUTO: YELLOW
GLUCOSE UR STRIP-MCNC: NEGATIVE MG/DL
HGB UR QL STRIP: ABNORMAL
KETONES UR STRIP-MCNC: NEGATIVE MG/DL
LEUKOCYTE ESTERASE UR QL STRIP: ABNORMAL
MUCOUS THREADS #/AREA URNS LPF: PRESENT /LPF
NITRATE UR QL: NEGATIVE
PH UR STRIP: 5 [PH] (ref 5–7)
RBC #/AREA URNS AUTO: ABNORMAL /HPF
SP GR UR STRIP: 1.02 (ref 1–1.03)
SQUAMOUS #/AREA URNS AUTO: ABNORMAL /LPF
UROBILINOGEN UR STRIP-ACNC: 0.2 E.U./DL
WBC #/AREA URNS AUTO: ABNORMAL /HPF

## 2022-12-22 PROCEDURE — 81001 URINALYSIS AUTO W/SCOPE: CPT

## 2022-12-22 PROCEDURE — 87086 URINE CULTURE/COLONY COUNT: CPT

## 2022-12-22 PROCEDURE — 99213 OFFICE O/P EST LOW 20 MIN: CPT | Mod: 95 | Performed by: PHYSICIAN ASSISTANT

## 2022-12-22 RX ORDER — NITROFURANTOIN 25; 75 MG/1; MG/1
100 CAPSULE ORAL 2 TIMES DAILY
Qty: 10 CAPSULE | Refills: 0 | Status: SHIPPED | OUTPATIENT
Start: 2022-12-22 | End: 2022-12-27

## 2022-12-22 RX ORDER — PHENAZOPYRIDINE HYDROCHLORIDE 100 MG/1
100 TABLET, FILM COATED ORAL 3 TIMES DAILY PRN
Qty: 6 TABLET | Refills: 0 | Status: SHIPPED | OUTPATIENT
Start: 2022-12-22 | End: 2023-06-20

## 2022-12-22 NOTE — TELEPHONE ENCOUNTER
"Virtual visit scheduled for today- pt willing to come to any [facility] location for lab only to drop off UA sample.    Reason for Disposition    Urinating more frequently than usual (i.e., frequency)    Additional Information    Negative: Shock suspected (e.g., cold/pale/clammy skin, too weak to stand, low BP, rapid pulse)    Negative: Sounds like a life-threatening emergency to the triager    Negative: Followed a female genital area injury (e.g., vagina, vulva)    Negative: Followed a male genital area injury (penis, scrotum)    Negative: Vaginal discharge    Negative: Pus (white, yellow) or bloody discharge from end of penis    Negative: Pain or burning with passing urine (urination) and pregnant    Negative: Pain or burning with passing urine (urination) and female    Negative: Pain or burning with passing urine (urination) and male    Negative: Pain or itching in the vulvar area    Negative: Pain in scrotum is main symptom    Negative: Blood in the urine is main symptom    Negative: Symptoms arising from use of a urinary catheter (e.g., coude, Arriaza)    Negative: Unable to urinate (or only a few drops) > 4 hours and bladder feels very full (e.g., palpable bladder or strong urge to urinate)    Negative: Decreased urination and drinking very little and dehydration suspected (e.g., dark urine, no urine > 12 hours, very dry mouth, very lightheaded)    Negative: Patient sounds very sick or weak to the triager    Negative: Fever > 100.4 F  (38.0 C)    Negative: Side (flank) or lower back pain present    Negative: Can't control passage of urine (i.e., urinary incontinence) and new-onset (< 2 weeks) or worsening    Answer Assessment - Initial Assessment Questions  1. SYMPTOM: \"What's the main symptom you're concerned about?\" (e.g., frequency, incontinence)      Frequency, odor  2. ONSET: \"When did the  frequency  start?\"      5 days ago  3. PAIN: \"Is there any pain?\" If Yes, ask: \"How bad is it?\" (Scale: 1-10; mild, moderate, " "severe)      no  4. CAUSE: \"What do you think is causing the symptoms?\"      Possible UTI  5. OTHER SYMPTOMS: \"Do you have any other symptoms?\" (e.g., fever, flank pain, blood in urine, pain with urination)      No but has feeling of fullness  6. PREGNANCY: \"Is there any chance you are pregnant?\" \"When was your last menstrual period?\"      no    Protocols used: URINARY SYMPTOMS-A-OH    Last BM- 12/21  Water intake- 30 ounces/ - advised to increase fluids to flush system.      Missy, RN    Triage Nurse  [facility]  Appointment line: [phone]  [facility], 24 hour nurse line, available by calling clinic at [phone] and following prompts.             "

## 2022-12-22 NOTE — PROGRESS NOTES
"Nazia is a 52 year old who is being evaluated via a billable video visit.      How would you like to obtain your AVS? MyChart  If the video visit is dropped, the invitation should be resent by: Text to cell phone: [phone]  Will anyone else be joining your video visit? No    Assessment & Plan   Problem List Items Addressed This Visit    None  Visit Diagnoses     Urinary frequency    -  Primary    Relevant Medications    nitroFURantoin macrocrystal-monohydrate (MACROBID) 100 MG capsule    phenazopyridine (PYRIDIUM) 100 MG tablet    Other Relevant Orders    UA Macro with Reflex to Micro and Culture - lab collect (Completed)         Nazia Jasso is a 52 year old female here with urinary urgency and frequency.    Denies abdominal or flank pain. Pt is afebrile & has not been vomiting. UA is equivocal for UTI. Will treat with nitrofurantoin empirically. UC sent. FU in 3 days for recheck as needed. If symptoms worsen, develop back pain/fevers/vomiting go to ER for further treatment.     DDx and Dx discussed with and explained to the pt to their satisfaction.  All questions were answered at this time. Pt expressed understanding of and agreement with this dx, tx, and plan. No further workup warranted and standard medication warnings given. I have given the patient a list of pertinent indications for re-evaluation. Will go to the Emergency Department if symptoms worsen or new concerning symptoms arise. Patient left the call in no apparent distress.     Ordering of each unique test  Prescription drug management     BMI:   Estimated body mass index is 38.32 kg/m  as calculated from the following:    Height as of 8/24/22: 1.727 m (5' 8\").    Weight as of 12/7/22: 114.3 kg (252 lb).     See Patient Instructions    Return in about 3 days (around 12/25/2022) for a recheck of your symptoms if not improving, or call 911/go to an ER anytime if worsening.    ERIC Bethea  [facility] " CHRISTIANE Mandujano is a 52 year old presenting for the following health issues:  No chief complaint on file.      HPI     Genitourinary - Female  Onset/Duration: 12/17/22. Urine smells like [place]  Description:   Painful urination (Dysuria): No           Frequency: YES  Blood in urine (Hematuria): No  Delay in urine (Hesitency): No  Intensity: moderate  Progression of Symptoms:  same  Accompanying Signs & Symptoms:  Fever/chills: No  Flank pain: No  Nausea and vomiting: No  Vaginal symptoms: none  Abdominal/Pelvic Pain: No  History:   History of frequent UTI s: No  History of kidney stones: No  Sexually Active: YES  Possibility of pregnancy: No  Precipitating or alleviating factors: None  Therapies tried and outcome:  None       Review of Systems   Constitutional, HEENT, cardiovascular, pulmonary, gi and gu systems are negative, except as otherwise noted.      Objective       Vitals:  No vitals were obtained today due to virtual visit.    Physical Exam   GENERAL: Healthy, alert and no distress  EYES: Eyes grossly normal to inspection.  No discharge or erythema, or obvious scleral/conjunctival abnormalities.  RESP: No audible wheeze, cough, or visible cyanosis.  No visible retractions or increased work of breathing.    SKIN: Visible skin clear. No significant rash, abnormal pigmentation or lesions.  NEURO: Cranial nerves grossly intact.  Mentation and speech appropriate for age.  PSYCH: Mentation appears normal, affect normal/bright, judgement and insight intact, normal speech and appearance well-groomed.      Results for orders placed or performed in visit on 12/22/22   UA Macro with Reflex to Micro and Culture - lab collect     Status: Abnormal    Specimen: Urine, Midstream   Result Value Ref Range    Color Urine Yellow Colorless, Straw, Light Yellow, Yellow    Appearance Urine Slightly Cloudy (A) Clear    Glucose Urine Negative Negative mg/dL    Bilirubin Urine Negative Negative    Ketones Urine  Negative Negative mg/dL    Specific Gravity Urine 1.020 1.003 - 1.035    Blood Urine Small (A) Negative    pH Urine 5.0 5.0 - 7.0    Protein Albumin Urine Negative Negative mg/dL    Urobilinogen Urine 0.2 0.2, 1.0 E.U./dL    Nitrite Urine Negative Negative    Leukocyte Esterase Urine Moderate (A) Negative   Urine Microscopic Exam     Status: Abnormal   Result Value Ref Range    Bacteria Urine Moderate (A) None Seen /HPF    RBC Urine 0-2 0-2 /HPF /HPF    WBC Urine 0-5 0-5 /HPF /HPF    Squamous Epithelials Urine Moderate (A) None Seen /LPF    Mucus Urine Present (A) None Seen /LPF   Urine Culture     Status: None    Specimen: Urine, Midstream   Result Value Ref Range    Culture 10,000-50,000 CFU/mL Mixture of urogenital marco antonio        Video-Visit Details    Type of service:  Video Visit, 12 min call    Originating Location (pt. Location): Home  Distant Location (provider location):  On-site  Platform used for Video Visit: Zac

## 2022-12-22 NOTE — PATIENT INSTRUCTIONS
Regina Mandujano,    Thank you for allowing [facility] to manage your care.    I am unsure of the cause of your symptoms, but your story is most consistent with a urinary tract infection. We will see what our workup shows.     If you develop worsening/changing symptoms at any time, please call 911 or go to the emergency department for evaluation.    I ordered some lab work, please go to the [facility] laboratory to get your studies.    I sent your prescriptions to your pharmacy.    Please allow 1-2 business days for our office to contact you in regards to your laboratory/radiological studies.  If not done so, I encourage you to login into [email or web address] ([email or web address] to review your results as well.     Drink 8-10 glasses of fluid daily to stay well-hydrated.    Take a probiotic pill, eat live culture yogurt (Greek yogurt or Activia), drink kefir daily for one week after finishing the antibiotics to encourage growth of good bacteria in your system.    If you have any questions or concerns, please feel free to call us at [phone]    Sincerely,    Kwan Vieira PA-C    Did you know?      You can schedule a video visit for follow-up appointments as well as future appointments for certain conditions.  Please see the below link.     [email or web address]    If you have not already done so,  I encourage you to sign up for [email or web address] ([email or web address]).  This will allow you to review your results, securely communicate with a provider, and schedule virtual visits as well.

## 2022-12-24 LAB — BACTERIA UR CULT: NORMAL

## 2022-12-29 ENCOUNTER — ANCILLARY PROCEDURE (OUTPATIENT)
Dept: MAMMOGRAPHY | Facility: CLINIC | Age: 52
End: 2022-12-29
Payer: COMMERCIAL

## 2022-12-29 DIAGNOSIS — Z12.31 VISIT FOR SCREENING MAMMOGRAM: ICD-10-CM

## 2022-12-29 PROCEDURE — 77063 BREAST TOMOSYNTHESIS BI: CPT | Mod: TC | Performed by: RADIOLOGY

## 2022-12-29 PROCEDURE — 77067 SCR MAMMO BI INCL CAD: CPT | Mod: TC | Performed by: RADIOLOGY

## 2023-01-18 ENCOUNTER — TELEPHONE (OUTPATIENT)
Dept: SLEEP MEDICINE | Facility: CLINIC | Age: 53
End: 2023-01-18
Payer: COMMERCIAL

## 2023-01-18 NOTE — TELEPHONE ENCOUNTER
Patient called in and LVM on [facility] asking if she really needed a follow up appt within 31-90 days since her insurance was not going to cover her machine anyways because of her deductible. Called patient back and LVM for her to call [facility] to inquire about her question.

## 2023-02-16 NOTE — PROGRESS NOTES
14  DAY STM VISIT    Diagnostic AHI: 18  PSG    Subjective measures:   Patient getting used to CPAP it's going better than she thought. She is being told that she doesn't snore.  Patient does not feel more rested.     Assessment: Pt meeting objective benchmarks.  Patient failing following subjective benchmarks: not feeling benefit from therapy    Action plan: pt to have 30 day STM visit.      Device type: Auto-CPAP    PAP settings: CPAP min 6.0 cm  H20       CPAP max 15.0 cm  H20      95th% pressure 13.2 cm  H20        RESMED EPR level Setting: TWO    RESMED Soft response setting:  OFF    Mask type:  Full Face Mask    Objective measures: 14 day rolling measures      Compliance  84 %      Leak  7.09  lpm  last  upload      AHI 2.65   last  upload      Average number of minutes 425      Objective measure goal  Compliance   Goal >70%  Leak   Goal < 24 lpm  AHI  Goal < 5  Usage  Goal >240        Total time spent on accessing and interpreting remote patient PAP therapy data  10 minutes    Total time spent counseling, coaching  and reviewing PAP therapy data with patient  3 minutes    [id number]  [id number]  no (3 day STM)     Physical Therapy    Visit Type: initial evaluation  Precautions:  Medical precautions:  fall risk; standard precautions.    Lines:     Basic: capped IV and telemetry      Lines in chart and on patient reviewed, precautions maintained throughout session.  Hearing: hard of hearing, wears hearing aids right and wears hearing aids left  Safety Measures: chair alarm  SUBJECTIVE  Patient agreed to participate in therapy this date.  \"I have had one thing after another since christmas\"  Patient / Family Goal: maximize function and return home     OBJECTIVE     Cognitive Status   Level of Consciousness   - alert  Arousal Alertness   - appropriate responses to stimuli    Vitals:  SPO2 at rest 96%; with ambulation 95%.    Patient Activity Tolerance: 1 to 1 activity to rest      Strength  (out of 5 unless noted, standard test position unless noted)   Comments / Details: Mild generalized weakness throughout.        Sitting Balance  (EDWARD = base of support)  Static      - Trial 1 details: independent  Dynamic      - Trial 1 details: modified independent    Standing Balance  (EDWARD = base of support)  Firm Surface: Double Leg      - Static, Eyes Open       - Trial 1 details: supervision and with double UE support     - Dynamic, Eyes Open       - Trial 1 details: minimal assist and with double UE support       Transfers  Assistive devices: gait belt, 4-wheeled walker  - Sit to stand: contact guard/touching/steadying assist  - Stand to sit: contact guard/touching/steadying assist  - Stand pivot: minimal assist  Direction for hand placements, positioning, safety with 4WW and use of breaks.      Ambulation / Gait  - Assistive device: gait belt and four-wheeled walker  - Distance (feet unless otherwise indicated): 80  - Assist Level: minimal assist  - Surface: even  - Description: forward flexed at hips, heavy reliance on BUE, decreased step length left, decreased stance time RLE and decreased denys/pace  Direction and cues for posture,  

## 2023-05-26 ENCOUNTER — OFFICE VISIT (OUTPATIENT)
Dept: FAMILY MEDICINE | Facility: CLINIC | Age: 53
End: 2023-05-26
Payer: COMMERCIAL

## 2023-05-26 ENCOUNTER — TELEPHONE (OUTPATIENT)
Dept: FAMILY MEDICINE | Facility: CLINIC | Age: 53
End: 2023-05-26

## 2023-05-26 VITALS
WEIGHT: 255 LBS | OXYGEN SATURATION: 97 % | HEART RATE: 58 BPM | HEIGHT: 68 IN | TEMPERATURE: 98.1 F | DIASTOLIC BLOOD PRESSURE: 70 MMHG | SYSTOLIC BLOOD PRESSURE: 114 MMHG | BODY MASS INDEX: 38.65 KG/M2 | RESPIRATION RATE: 15 BRPM

## 2023-05-26 DIAGNOSIS — E66.01 MORBID OBESITY (H): ICD-10-CM

## 2023-05-26 DIAGNOSIS — Z00.00 ROUTINE HISTORY AND PHYSICAL EXAMINATION OF ADULT: Primary | ICD-10-CM

## 2023-05-26 DIAGNOSIS — Z13.1 SCREENING FOR DIABETES MELLITUS: ICD-10-CM

## 2023-05-26 DIAGNOSIS — Z12.4 CERVICAL CANCER SCREENING: ICD-10-CM

## 2023-05-26 DIAGNOSIS — Z13.220 SCREENING FOR HYPERLIPIDEMIA: ICD-10-CM

## 2023-05-26 DIAGNOSIS — F33.0 MAJOR DEPRESSIVE DISORDER, RECURRENT EPISODE, MILD (H): ICD-10-CM

## 2023-05-26 PROCEDURE — 87624 HPV HI-RISK TYP POOLED RSLT: CPT | Performed by: NURSE PRACTITIONER

## 2023-05-26 PROCEDURE — 99396 PREV VISIT EST AGE 40-64: CPT | Performed by: NURSE PRACTITIONER

## 2023-05-26 PROCEDURE — G0145 SCR C/V CYTO,THINLAYER,RESCR: HCPCS | Performed by: NURSE PRACTITIONER

## 2023-05-26 PROCEDURE — 99214 OFFICE O/P EST MOD 30 MIN: CPT | Mod: 25 | Performed by: NURSE PRACTITIONER

## 2023-05-26 PROCEDURE — 96127 BRIEF EMOTIONAL/BEHAV ASSMT: CPT | Performed by: NURSE PRACTITIONER

## 2023-05-26 RX ORDER — CITALOPRAM HYDROBROMIDE 40 MG/1
40 TABLET ORAL DAILY
Qty: 90 TABLET | Refills: 0 | Status: SHIPPED | OUTPATIENT
Start: 2023-05-26 | End: 2023-09-04

## 2023-05-26 ASSESSMENT — ENCOUNTER SYMPTOMS
BREAST MASS: 0
SHORTNESS OF BREATH: 0
HEADACHES: 0
FEVER: 0
NERVOUS/ANXIOUS: 1
MYALGIAS: 1
DIZZINESS: 0
PARESTHESIAS: 0
HEMATOCHEZIA: 0
NAUSEA: 0
EYE PAIN: 0
PALPITATIONS: 0
COUGH: 0
DIARRHEA: 0
CHILLS: 0
HEMATURIA: 0
HEARTBURN: 0
FREQUENCY: 0
WEAKNESS: 0
ABDOMINAL PAIN: 0
SORE THROAT: 0
DYSURIA: 0
CONSTIPATION: 0
ARTHRALGIAS: 1
JOINT SWELLING: 0

## 2023-05-26 ASSESSMENT — PATIENT HEALTH QUESTIONNAIRE - PHQ9
SUM OF ALL RESPONSES TO PHQ QUESTIONS 1-9: 7
SUM OF ALL RESPONSES TO PHQ QUESTIONS 1-9: 7
10. IF YOU CHECKED OFF ANY PROBLEMS, HOW DIFFICULT HAVE THESE PROBLEMS MADE IT FOR YOU TO DO YOUR WORK, TAKE CARE OF THINGS AT HOME, OR GET ALONG WITH OTHER PEOPLE: SOMEWHAT DIFFICULT

## 2023-05-26 ASSESSMENT — PAIN SCALES - GENERAL: PAINLEVEL: NO PAIN (0)

## 2023-05-26 NOTE — TELEPHONE ENCOUNTER
Pharmacy is stating that there is a formulary issue and that the Ozempic is not covered, would like new script for new medication. No alternative given.

## 2023-05-26 NOTE — PROGRESS NOTES
SUBJECTIVE:   CC: Nazia is an 52 year old who presents for preventive health visit.       5/26/2023    12:48 PM   Additional Questions   Roomed by Brook ANDERSON     Patient has been advised of split billing requirements and indicates understanding: Yes  Healthy Habits:     Getting at least 3 servings of Calcium per day:  NO    Bi-annual eye exam:  NO    Dental care twice a year:  Yes    Sleep apnea or symptoms of sleep apnea:  Sleep apnea    Diet:  Regular (no restrictions)    Frequency of exercise:  2-3 days/week    Duration of exercise:  15-30 minutes    Taking medications regularly:  Yes    Medication side effects:  None    PHQ-2 Total Score: 2    Additional concerns today:  Yes    Zoloft in the past- lost libido    Has 2 teenagers going through gender identity issues.  Carrying stress, crying more often.  Hard to experience the taty.    Most likely fibromyalgia per past evaluations.  Lives with stiffness.  Wants to loose weight.  Scoliosis- back pain.    .      Depression Followup    How are you doing with your depression since your last visit? Worsened      Are you having other symptoms that might be associated with depression? Yes:  crying     Have you had a significant life event?  Relationship Concerns with kids      Are you feeling anxious or having panic attacks?   Yes:  anxious     Do you have any concerns with your use of alcohol or other drugs? No    Social History     Tobacco Use     Smoking status: Never     Smokeless tobacco: Never   Vaping Use     Vaping status: Never Used   Substance Use Topics     Alcohol use: Yes     Comment: 1-2 drink per month     Drug use: No         3/23/2022    11:46 AM 4/14/2022     6:48 AM 5/26/2023    12:40 PM   PHQ   PHQ-9 Total Score 2 6 7   Q9: Thoughts of better off dead/self-harm past 2 weeks Not at all Not at all Not at all         1/21/2020    10:50 AM 4/14/2022     6:49 AM   ANDRES-7 SCORE   Total Score  3 (minimal anxiety)   Total Score 1 3          5/26/2023    12:40 PM   Last PHQ-9   1.  Little interest or pleasure in doing things 1   2.  Feeling down, depressed, or hopeless 1   3.  Trouble falling or staying asleep, or sleeping too much 1   4.  Feeling tired or having little energy 2   5.  Poor appetite or overeating 1   6.  Feeling bad about yourself 1   7.  Trouble concentrating 0   8.  Moving slowly or restless 0   Q9: Thoughts of better off dead/self-harm past 2 weeks 0   PHQ-9 Total Score 7       Suicide Assessment Five-step Evaluation and Treatment (SAFE-T)          Social History     Tobacco Use     Smoking status: Never     Smokeless tobacco: Never   Vaping Use     Vaping status: Never Used   Substance Use Topics     Alcohol use: Yes     Comment: 1-2 drink per month             5/26/2023    12:42 PM   Alcohol Use   Prescreen: >3 drinks/day or >7 drinks/week? No          View : No data to display.              Reviewed orders with patient.  Reviewed health maintenance and updated orders accordingly - Yes  BP Readings from Last 3 Encounters:   05/26/23 114/70   12/07/22 127/48   04/14/22 120/76    Wt Readings from Last 3 Encounters:   05/26/23 115.7 kg (255 lb)   12/07/22 114.3 kg (252 lb)   08/24/22 111.1 kg (245 lb)                  Patient Active Problem List   Diagnosis     CARDIOVASCULAR SCREENING; LDL GOAL LESS THAN 160     Mild major depression (H)     Family history of thyroid problem     ASCUS of cervix with negative high risk HPV     KIYA (obstructive sleep apnea)- moderate (AHI 18)     Obesity (BMI 35.0-39.9) with comorbidity (H)     Chronic pain of both knees     Trochanteric bursitis of both hips     Major depressive disorder, recurrent episode, mild (H)     History reviewed. No pertinent surgical history.    Social History     Tobacco Use     Smoking status: Never     Smokeless tobacco: Never   Vaping Use     Vaping status: Never Used   Substance Use Topics     Alcohol use: Yes     Comment: 1-2 drink per month     Family History   Problem  Relation Age of Onset     C.A.YADI. Paternal Grandfather      Cerebrovascular Disease Paternal Grandfather      CSpringAFAUSTO. Maternal Grandfather      Hypertension Mother      Hypertension Maternal Grandmother      Prostate Cancer Father      Thyroid Disease Sister      Hypothyroidism Sister      No Known Problems Brother      Diabetes No family hx of      Breast Cancer No family hx of      Cancer - colorectal No family hx of          Current Outpatient Medications   Medication Sig Dispense Refill     Cholecalciferol (VITAMIN D-3) 5000 units TABS        citalopram (CELEXA) 20 MG tablet TAKE 1 TABLET BY MOUTH EVERY DAY 30 tablet 0            levothyroxine (SYNTHROID/LEVOTHROID) 50 MCG tablet Take 1 tablet (50 mcg) by mouth daily 90 tablet 5     liothyronine (CYTOMEL) 5 MCG tablet Take 1 tablet (5 mcg) by mouth daily 90 tablet 3     Multiple Vitamin (MULTIVITAMINS PO)                      cyclobenzaprine (FLEXERIL) 5 MG tablet Take 1/2-1 tab prior to bedtime, prn.  If still symptomatic or if still not sleeping well and well-tolerated can increase by half tablet (half milligram increments) up to 2 tablets prior to bedtime, as needed. (Patient not taking: Reported on 2023) 45 tablet 2     phenazopyridine (PYRIDIUM) 100 MG tablet Take 1 tablet (100 mg) by mouth 3 times daily as needed for urinary tract discomfort (Patient not taking: Reported on 2023) 6 tablet 0     Allergies   Allergen Reactions     Septra Ds [Sulfamethoxazole W-Trimethoprim] Hives     Happened at end of course from 2011-     Amoxicillin      Erythromycin      hives     Penicillins      hives       Breast Cancer Screenin/14/2022     6:52 AM   Breast CA Risk Assessment (FHS-7)   Do you have a family history of breast, colon, or ovarian cancer? No / Unknown       Mammogram Screening: Recommended annual mammography  Pertinent mammograms are reviewed under the imaging tab.    History of abnormal Pap smear: YES - updated in  "Problem List and Health Maintenance accordingly      Latest Ref Rng & Units 1/21/2020    10:53 AM 1/21/2020    10:43 AM 6/17/2016     2:40 PM   PAP / HPV   PAP (Historical)   NIL      HPV 16 DNA NEG^Negative Negative    Negative     HPV 18 DNA NEG^Negative Negative    Negative     Other HR HPV NEG^Negative Negative    Negative       Reviewed and updated as needed this visit by clinical staff   Tobacco  Allergies  Meds   Med Hx  Surg Hx  Fam Hx          Reviewed and updated as needed this visit by Provider     Meds   Med Hx  Surg Hx  Fam Hx           Review of Systems   Constitutional: Negative for chills and fever.   HENT: Negative for congestion, ear pain, hearing loss and sore throat.    Eyes: Negative for pain and visual disturbance.   Respiratory: Negative for cough and shortness of breath.    Cardiovascular: Negative for chest pain, palpitations and peripheral edema.   Gastrointestinal: Negative for abdominal pain, constipation, diarrhea, heartburn, hematochezia and nausea.   Breasts:  Negative for tenderness, breast mass and discharge.   Genitourinary: Negative for dysuria, frequency, genital sores, hematuria, pelvic pain, urgency, vaginal bleeding and vaginal discharge.   Musculoskeletal: Positive for arthralgias and myalgias. Negative for joint swelling.   Skin: Negative for rash.   Neurological: Negative for dizziness, weakness, headaches and paresthesias.   Psychiatric/Behavioral: Positive for mood changes. The patient is nervous/anxious.        OBJECTIVE:   /70 (BP Location: Right arm, Patient Position: Sitting, Cuff Size: Adult Large)   Pulse 58   Temp 98.1  F (36.7  C) (Oral)   Resp 15   Ht 1.727 m (5' 8\")   Wt 115.7 kg (255 lb)   LMP  (LMP Unknown)   SpO2 97%   BMI 38.77 kg/m    Physical Exam  GENERAL APPEARANCE: healthy, alert, no distress and obese  EYES: Eyes grossly normal to inspection, PERRL and conjunctivae and sclerae normal  HENT: ear canals and TM's normal, nose and " mouth without ulcers or lesions, oropharynx clear and oral mucous membranes moist  NECK: no adenopathy, no asymmetry, masses, or scars and thyroid normal to palpation  RESP: lungs clear to auscultation - no rales, rhonchi or wheezes  BREAST: normal without masses, tenderness or nipple discharge and no palpable axillary masses or adenopathy  CV: regular rate and rhythm, normal S1 S2, no S3 or S4, no murmur, click or rub, no peripheral edema and peripheral pulses strong  ABDOMEN: soft, nontender, no hepatosplenomegaly, no masses and bowel sounds normal   (female): normal female external genitalia, normal urethral meatus, vaginal mucosal atrophy noted, normal cervix, adnexae, and uterus without masses or abnormal discharge  MS: no musculoskeletal defects are noted and gait is age appropriate without ataxia  SKIN: no suspicious lesions or rashes.  Multiple hyperpigmented lesions throughout  NEURO: Normal strength and tone, sensory exam grossly normal, mentation intact and speech normal  PSYCH: mentation appears normal and affect normal/bright    Diagnostic Test Results:  Labs reviewed in Epic    ASSESSMENT/PLAN:   Nazia was seen today for physical.    Diagnoses and all orders for this visit:    Routine history and physical examination of adult  -     REVIEW OF HEALTH MAINTENANCE PROTOCOL ORDERS    Cervical cancer screening  -     Pap Screen with HPV - recommended age 30 - 65 years    Morbid obesity (H)  Discussed with patient the indication and use of medication(s), risks/benefits, and potential adverse side effects.  Patient/guardian verbalized understanding and agreement with the plan.   -     semaglutide (OZEMPIC) 2 MG/3ML pen; Inject 0.25 mg Subcutaneous every 7 days For weeks 1-4  -     semaglutide (OZEMPIC) 2 MG/3ML pen; Inject 0.5 mg Subcutaneous every 7 days For weeks 5-8  -     PRIMARY CARE FOLLOW-UP SCHEDULING; Future    Major depressive disorder, recurrent episode, mild (H)  Uncontrolled  -     Increase  "from 20 mg to citalopram (CELEXA) 40 MG tablet; Take 1 tablet (40 mg) by mouth daily    Screening for diabetes mellitus  -     Comprehensive metabolic panel (BMP + Alb, Alk Phos, ALT, AST, Total. Bili, TP); Future    Screening for hyperlipidemia  -     Lipid panel reflex to direct LDL Fasting; Future        COUNSELING:  Reviewed preventive health counseling, as reflected in patient instructions       Regular exercise       Healthy diet/nutrition      BMI:   Estimated body mass index is 38.77 kg/m  as calculated from the following:    Height as of this encounter: 1.727 m (5' 8\").    Weight as of this encounter: 115.7 kg (255 lb).   Weight management plan: Discussed healthy diet and exercise guidelines      She reports that she has never smoked. She has never used smokeless tobacco.          Natalie Garcia NP  [facility]  Answers for HPI/ROS submitted by the patient on 5/26/2023  If you checked off any problems, how difficult have these problems made it for you to do your work, take care of things at home, or get along with other people?: Somewhat difficult  PHQ9 TOTAL SCORE: 7      "

## 2023-05-30 NOTE — TELEPHONE ENCOUNTER
Routing to provider    RN called pharmacy for alternative    Pharmacy reports no alternative given just a PA request at this time. Do we want to start a PA?    Chela Diaz RN

## 2023-05-31 LAB
BKR LAB AP GYN ADEQUACY: NORMAL
BKR LAB AP GYN INTERPRETATION: NORMAL
BKR LAB AP HPV REFLEX: NORMAL
BKR LAB AP PREVIOUS ABNORMAL: NORMAL
PATH REPORT.COMMENTS IMP SPEC: NORMAL
PATH REPORT.COMMENTS IMP SPEC: NORMAL
PATH REPORT.RELEVANT HX SPEC: NORMAL

## 2023-06-02 ENCOUNTER — TELEPHONE (OUTPATIENT)
Dept: FAMILY MEDICINE | Facility: CLINIC | Age: 53
End: 2023-06-02
Payer: COMMERCIAL

## 2023-06-03 NOTE — TELEPHONE ENCOUNTER
Central Prior Authorization Team   Phone: [phone]      PRIOR AUTHORIZATION DENIED    Medication: SEMAGLUTIDE (2 MG/DOSE) SC  Insurance Company: [employer] - Phone [phone] Fax [phone]  Denial Date: 6/2/2023  Denial Rational:         Appeal Information:       Patient Notified: No

## 2023-06-04 ENCOUNTER — MYC MEDICAL ADVICE (OUTPATIENT)
Dept: FAMILY MEDICINE | Facility: CLINIC | Age: 53
End: 2023-06-04
Payer: COMMERCIAL

## 2023-06-04 DIAGNOSIS — E66.01 MORBID OBESITY (H): Primary | ICD-10-CM

## 2023-06-04 LAB
HUMAN PAPILLOMA VIRUS 16 DNA: NEGATIVE
HUMAN PAPILLOMA VIRUS 18 DNA: NEGATIVE
HUMAN PAPILLOMA VIRUS FINAL DIAGNOSIS: NORMAL
HUMAN PAPILLOMA VIRUS OTHER HR: NEGATIVE

## 2023-06-05 ENCOUNTER — TELEPHONE (OUTPATIENT)
Dept: FAMILY MEDICINE | Facility: CLINIC | Age: 53
End: 2023-06-05
Payer: COMMERCIAL

## 2023-06-05 NOTE — TELEPHONE ENCOUNTER
Prior Authorization Retail Medication Request    Medication/Dose: Semaglutide-Weight Management (WEGOVY) 0.25 MG/0.5ML pen    ICD code (if different than what is on RX):  NA  Previously Tried and Failed:  NA  Rationale:  NA    Insurance Name:  NA  Insurance ID:  TRX CODE: [id number]      Pharmacy Information (if different than what is on RX)  Name:  same  Phone:  same    Visit [employer] and enter TRX code [id number].      Obtain PA using PA Phone # [phone]

## 2023-06-06 ENCOUNTER — TELEPHONE (OUTPATIENT)
Dept: FAMILY MEDICINE | Facility: CLINIC | Age: 53
End: 2023-06-06
Payer: COMMERCIAL

## 2023-06-06 NOTE — TELEPHONE ENCOUNTER
RN replied to patient via [employer]. See message for details.     Trav Borja RN, BSN, PHN  [facility]: [place]

## 2023-06-06 NOTE — TELEPHONE ENCOUNTER
Prior Authorization Retail Medication Request    Medication/Dose: Semaglutide-Weight Management (WEGOVY) 0.5 MG/0.5ML pen    ICD code (if different than what is on RX):  na  Previously Tried and Failed:  na  Rationale:  na    Insurance Name:  na  Insurance ID:  na      Pharmacy Information (if different than what is on RX)  Name:  same  Phone:  same    Pharmacy Comments:  Alternative Requested: PA required!  Thanks!

## 2023-06-09 NOTE — TELEPHONE ENCOUNTER
Looks like ozempic was discontinued and wegovy was prescribed instead and PA started for that in a different encounter. Closing this encounter.    Cheri See JUAN Rojo

## 2023-06-09 NOTE — TELEPHONE ENCOUNTER
Central Prior Authorization Team   Phone: [phone]    PA Initiation    Medication: Semaglutide-Weight Management (WEGOVY) 0.25 MG/0.5ML pen  Insurance Company: [employer] - Phone [phone] Fax [phone]  Pharmacy Filling the Rx: [id number] - [place], [place] - [address]  Filling Pharmacy Phone: [phone]  Filling Pharmacy Fax:    Start Date: 6/9/2023

## 2023-06-12 ENCOUNTER — VIRTUAL VISIT (OUTPATIENT)
Dept: ENDOCRINOLOGY | Facility: CLINIC | Age: 53
End: 2023-06-12
Payer: COMMERCIAL

## 2023-06-12 ENCOUNTER — TELEPHONE (OUTPATIENT)
Dept: ENDOCRINOLOGY | Facility: CLINIC | Age: 53
End: 2023-06-12

## 2023-06-12 DIAGNOSIS — R94.6 ABNORMAL FINDING ON THYROID FUNCTION TEST: Primary | ICD-10-CM

## 2023-06-12 PROCEDURE — 99213 OFFICE O/P EST LOW 20 MIN: CPT | Mod: VID | Performed by: STUDENT IN AN ORGANIZED HEALTH CARE EDUCATION/TRAINING PROGRAM

## 2023-06-12 ASSESSMENT — ENCOUNTER SYMPTOMS
TREMORS: 0
LOSS OF CONSCIOUSNESS: 0
HOARSE VOICE: 1
SEIZURES: 0
WHEEZING: 0
NERVOUS/ANXIOUS: 1
INSOMNIA: 0
TASTE DISTURBANCE: 0
MYALGIAS: 1
HEMOPTYSIS: 0
MEMORY LOSS: 0
MUSCLE WEAKNESS: 0
DECREASED CONCENTRATION: 0
STIFFNESS: 1
MUSCLE CRAMPS: 0
SINUS PAIN: 0
PARALYSIS: 0
SMELL DISTURBANCE: 0
DEPRESSION: 1
WEAKNESS: 0
SPUTUM PRODUCTION: 1
NECK MASS: 0
DIZZINESS: 0
ARTHRALGIAS: 1
TROUBLE SWALLOWING: 0
DISTURBANCES IN COORDINATION: 0
BACK PAIN: 1
NUMBNESS: 0
PANIC: 0
POSTURAL DYSPNEA: 0
SINUS CONGESTION: 1
HEADACHES: 1
JOINT SWELLING: 0
NECK PAIN: 0
SHORTNESS OF BREATH: 0
SORE THROAT: 1
TINGLING: 0
COUGH DISTURBING SLEEP: 1
DYSPNEA ON EXERTION: 0
SPEECH CHANGE: 0
SNORES LOUDLY: 1
COUGH: 1

## 2023-06-12 NOTE — TELEPHONE ENCOUNTER
Prior Authorization Approval    Authorization Effective Date: 5/10/2023  Authorization Expiration Date: 1/5/2024  Medication: Semaglutide-Weight Management (WEGOVY) 0.25 MG/0.5ML pen  Approved Dose/Quantity:    Reference #:     Insurance Company: [employer] - Phone [phone] Fax [phone]  Expected CoPay:       CoPay Card Available:      Foundation Assistance Needed:    Which Pharmacy is filling the prescription (Not needed for infusion/clinic administered): [id number] [place], [place] - [address] [place] [address]  Pharmacy Notified: Yes  Patient Notified:  Pharmacy will notify patient when medication is available.

## 2023-06-12 NOTE — TELEPHONE ENCOUNTER
Central Prior Authorization Team   Phone: [phone]    PA Initiation    Medication: Semaglutide-Weight Management (WEGOVY) 0.5 MG/0.5ML pen  Insurance Company: [employer] - Phone [phone] Fax [phone]  Pharmacy Filling the Rx: [id number] - [place], [place] - [address]  Filling Pharmacy Phone: [phone]  Filling Pharmacy Fax:    Start Date: 6/12/2023

## 2023-06-12 NOTE — NURSING NOTE
Is the patient currently in the state of [place]? YES    Visit mode:VIDEO    If the visit is dropped, the patient can be reconnected by: VIDEO VISIT: Text to cell phone: [phone]    Will anyone else be joining the visit? NO      How would you like to obtain your AVS? MyChart    Are changes needed to the allergy or medication list? NO    Reason for visit: RECHECK

## 2023-06-12 NOTE — TELEPHONE ENCOUNTER
[facility] [place] for patient to schedule a 1 year follow up with Dr. Trinidad.

## 2023-06-12 NOTE — PROGRESS NOTES
Endocrinology Clinic Visit       Endocrinology Clinic Visit 6/12/2023      Video-Visit Details    Type of service:  Video Visit    Joined the call at 6/12/2023, 11:09:43 am.  Left the call at 6/12/2023, 11:17:52 am.    Originating Location (pt. Location): Home        Distant Location (provider location):  Off-site    Mode of Communication:  Video Conference via [employer]    Physician has received verbal consent for a Video Visit from the patient? Yes    I spent a total of 20 minutes on the date of encounter reviewing medical records, evaluating the patient, coordinating care and documenting in the EHR, as detailed above.        NAME:  Nazia Jasso  PCP:  Sarah Gregorio  MRN:  [id number]  Reason for Consult:  hypothyroidism  Requesting Provider:  Fidencio Zambrano    Chief Complaint     Chief Complaint   Patient presents with     RECHECK       History of Present Illness     Nazia Jasso is a 52 year old female who is seen in clinic for hypothyroidism. She established with me on 12/7/2022.          She has been struggling with fatigue for few years. She had normal TFT over the years. She was seen by Dr. Zambrano on 4/2021, she had normal TSH and FT4, low FT3. She was started on LT4 50 mcg daily and liothyronine 5 mg daily. Most recent TFTs 4/2022 and 12/2022 wnl.     She continued to have fatigue despite thyroid hormone replacement. She said maybe a slight improvement. She used to be freezing with severe cold intolerance which improved with thyroid treatment. She said she is also going through menopause, no periods for the past year and having hot flashes.  She has a stable wt over the past 5 years and having difficullty loosing wt.     She has KIYA, she started on CPAP 2 month and still no improvement with her fatigue. ROS is positive for diffuse pain and had a work up by rheumatology which was negative; probably fibromyalgia. Occasional dizziness and lhd which  attributed to her reading glasses. No muscle weakness. She said she always been an easy bruiser. No purple stretch marks. No facial rounding.     Interval hx: continues to have joint pain otherwise everything the same. wegovy was prescribed by her PCP not started yet.   Currently takes levothyroxine on empty stomach and waits at least 30 minutes before eating.  Does not take calcium or iron containing multivitamins within 4 hours of taking the levothyroxine tablet.  No known celiac disease.      Her sister has hypothyroidism, levothyroxine did not help, synthroid worked for her.    Family hx: hypothyroidism in her sister . Father and brother hyperthyroidism.   Social: she     Problem List     Patient Active Problem List   Diagnosis     CARDIOVASCULAR SCREENING; LDL GOAL LESS THAN 160     Mild major depression (H)     Family history of thyroid problem     ASCUS of cervix with negative high risk HPV     KIYA (obstructive sleep apnea)- moderate (AHI 18)     Obesity (BMI 35.0-39.9) with comorbidity (H)     Chronic pain of both knees     Trochanteric bursitis of both hips     Major depressive disorder, recurrent episode, mild (H)        Medications     Current Outpatient Medications   Medication     Cholecalciferol (VITAMIN D-3) 5000 units TABS     citalopram (CELEXA) 40 MG tablet     levothyroxine (SYNTHROID/LEVOTHROID) 50 MCG tablet     liothyronine (CYTOMEL) 5 MCG tablet     Multiple Vitamin (MULTIVITAMINS PO)     Semaglutide-Weight Management (WEGOVY) 0.25 MG/0.5ML pen     [START ON 7/3/2023] Semaglutide-Weight Management (WEGOVY) 0.5 MG/0.5ML pen     cyclobenzaprine (FLEXERIL) 5 MG tablet     phenazopyridine (PYRIDIUM) 100 MG tablet     No current facility-administered medications for this visit.        Allergies     Allergies   Allergen Reactions     Septra Ds [Sulfamethoxazole W-Trimethoprim] Hives     Happened at end of course from 12/01/2011-12/     Amoxicillin      Erythromycin      hives     Penicillins       hives       Medical / Surgical History     Past Medical History:   Diagnosis Date     ASCUS of cervix with negative high risk HPV 6/17/16 6/17/16 ASCUS/neg HR HPV.      Depressive disorder 1997     Depressive disorder, not elsewhere classified     Depression (non-psychotic)     Endometriosis, site unspecified     Endometriosis     Female infertility of unspecified origin     Female infertility     Thyroid disease      No past surgical history on file.    Social History     Social History     Socioeconomic History     Marital status:      Spouse name: james     Number of children: 0     Years of education: 18     Highest education level: Not on file   Occupational History     Occupation: manager     Employer: [employer]   Tobacco Use     Smoking status: Never     Smokeless tobacco: Never   Vaping Use     Vaping status: Never Used   Substance and Sexual Activity     Alcohol use: Yes     Comment: 1-2 drink per month     Drug use: No     Sexual activity: Yes     Partners: Male     Birth control/protection: Condom   Other Topics Concern     Parent/sibling w/ CABG, MI or angioplasty before 65F 55M? No   Social History Narrative     Not on file     Social Determinants of Health     Financial Resource Strain: Not on file   Food Insecurity: Not on file   Transportation Needs: Not on file   Physical Activity: Not on file   Stress: Not on file   Social Connections: Not on file   Intimate Partner Violence: Not on file   Housing Stability: Not on file       Family History     Family History   Problem Relation Age of Onset     C.A.D. Paternal Grandfather      Cerebrovascular Disease Paternal Grandfather      C.A.D. Maternal Grandfather      Hypertension Mother      Hypertension Maternal Grandmother      Prostate Cancer Father      Thyroid Disease Sister      Hypothyroidism Sister      No Known Problems Brother      Diabetes No family hx of      Breast Cancer No family hx of      Cancer - colorectal No  family hx of        ROS     12 ROS completed, pertinent positive and negative in HPI  Has some common cold otherwise as above  Answers for HPI/ROS submitted by the patient on 6/12/2023  General Symptoms: No  Skin Symptoms: No  HENT Symptoms: Yes  EYE SYMPTOMS: No  HEART SYMPTOMS: No  LUNG SYMPTOMS: Yes  INTESTINAL SYMPTOMS: No  URINARY SYMPTOMS: No  GYNECOLOGIC SYMPTOMS: No  BREAST SYMPTOMS: No  SKELETAL SYMPTOMS: Yes  BLOOD SYMPTOMS: No  NERVOUS SYSTEM SYMPTOMS: Yes  MENTAL HEALTH SYMPTOMS: Yes  Ear pain: No  Ear discharge: No  Hearing loss: No  Tinnitus: No  Nosebleeds: No  Congestion: Yes  Sinus pain: No  Trouble swallowing: No   Voice hoarseness: Yes  Mouth sores: No  Sore throat: Yes  Tooth pain: No  Gum tenderness: No  Bleeding gums: No  Change in taste: No  Change in sense of smell: No  Dry mouth: Yes  Hearing aid used: No  Neck lump: No  Cough: Yes  Sputum or phlegm: Yes  Coughing up blood: No  Difficulty breating or shortness of breath: No  Snoring: Yes  Wheezing: No  Difficulty breathing on exertion: No  Nighttime Cough: Yes  Difficulty breathing when lying flat: No  Back pain: Yes  Muscle aches: Yes  Neck pain: No  Swollen joints: No  Joint pain: Yes  Bone pain: Yes  Muscle cramps: No  Muscle weakness: No  Joint stiffness: Yes  Bone fracture: No  Trouble with coordination: No  Dizziness or trouble with balance: No  Fainting or black-out spells: No  Memory loss: No  Headache: Yes  Seizures: No  Speech problems: No  Tingling: No  Tremor: No  Weakness: No  Difficulty walking: No  Paralysis: No  Numbness: No  Nervous or Anxious: Yes  Depression: Yes  Trouble sleeping: No  Trouble thinking or concentrating: No  Mood changes: No  Panic attacks: No      Physical Exam   LMP  (LMP Unknown)      General: Comfortable, no obvious distress, normal body habitus  Eyes: Sclera anicteric, moist conjunctiva  HENT: Atraumatic, oropharynx clear, moist mucous membranes with no mucosal ulcerations  Neck: Trachea midline,  supple. Thyroid: Thyroid is normal in size and texture  CV: normal rate.   Resp:  good effort, no evidence of loud wheezing  Abdomen:  obese, non distended.   Skin: No rashes, lesions, or subcutaneous nodules on exposed skin.   Psych: Alert and oriented x 3. Appropriate affect, good insight  Extremities: No peripheral edema  Musculoskeletal: Appropriate muscle bulk and strength  Neuro: Moves all four extremities. No focal deficits on limited exam. Gait normal.     Labs/Imaging     Pertinent Labs were reviewed and updated in EPIC and discussed briefly.  Radiology Results were  reviewed and updated in EPIC and discussed briefly.    Summary of recent findings:   Lab Results   Component Value Date    A1C 5.0 03/09/2021       TSH   Date Value Ref Range Status   12/07/2022 2.13 0.30 - 4.20 uIU/mL Final   04/14/2022 1.87 0.40 - 4.00 mU/L Final   05/28/2021 2.42 0.40 - 4.00 mU/L Final   04/21/2021 2.79 0.40 - 4.00 mU/L Final   03/09/2021 3.69 0.40 - 4.00 mU/L Final   01/11/2019 3.16 0.40 - 4.00 mU/L Final   06/09/2016 3.22 0.40 - 4.00 mU/L Final     T4 Free   Date Value Ref Range Status   05/28/2021 1.12 0.76 - 1.46 ng/dL Final   04/21/2021 1.05 0.76 - 1.46 ng/dL Final   03/09/2021 0.91 0.76 - 1.46 ng/dL Final   01/11/2019 1.04 0.76 - 1.46 ng/dL Final     Free T4   Date Value Ref Range Status   12/07/2022 1.12 0.90 - 1.70 ng/dL Final   04/14/2022 1.09 0.76 - 1.46 ng/dL Final       Creatinine   Date Value Ref Range Status   03/09/2021 0.63 0.52 - 1.04 mg/dL Final       Recent Labs   Lab Test 04/14/22  0813 01/11/19  1511   CHOL 159 159   HDL 52 57   LDL 93 88   TRIG 72 70       No results found for: [id number], [id number], [id number]    I personally reviewed the patient's outside records from [facility] EMR and Care Everywhere. Summary of pertinent findings in HPI.    Impression / Plan     1. Hypothyroidism  She never really had a period of overt hypothyroidism. All her TSH and FT4 in records wnl. She had low free t3 on 3/2021  and was started on thyroid replacement based on that. She is on lt4 at 50 mcg daily and liothironine 5 mg daily.  We discussed checking tft yearly or earlier if sx changes or significant weight on wegovy happened.    2. Obesity, BMI 38  Prescribed wegovy by PCP.   Following with PCP.    Test and/or medications prescribed today:  No orders of the defined types were placed in this encounter.        Follow up:yearly. Could graduate from endocrine and follow up with PCP.      Augie Trinidad MD  Endocrinology, Diabetes and Metabolism  [facility]

## 2023-06-12 NOTE — LETTER
6/12/2023       RE: Nazia Jasso  [address] [place] [address]     Dear Colleague,    Thank you for referring your patient, Nazia Jasso, to the [facility] [place] at [facility]. Please see a copy of my visit note below.    Endocrinology Clinic Visit       Endocrinology Clinic Visit 6/12/2023      Video-Visit Details    Type of service:  Video Visit    Joined the call at 6/12/2023, 11:09:43 am.  Left the call at 6/12/2023, 11:17:52 am.    Originating Location (pt. Location): Home        Distant Location (provider location):  Off-site    Mode of Communication:  Video Conference via [employer]Well    Physician has received verbal consent for a Video Visit from the patient? Yes    I spent a total of 20 minutes on the date of encounter reviewing medical records, evaluating the patient, coordinating care and documenting in the EHR, as detailed above.        NAME:  Nazia Jasso  PCP:  Sarah Gregorio  MRN:  [id number]  Reason for Consult:  hypothyroidism  Requesting Provider:  Fidencio Zambrano    Chief Complaint     Chief Complaint   Patient presents with    RECHECK       History of Present Illness     Nazia Jasso is a 52 year old female who is seen in clinic for hypothyroidism. She established with me on 12/7/2022.          She has been struggling with fatigue for few years. She had normal TFT over the years. She was seen by Dr. Zambrano on 4/2021, she had normal TSH and FT4, low FT3. She was started on LT4 50 mcg daily and liothyronine 5 mg daily. Most recent TFTs 4/2022 and 12/2022 wnl.     She continued to have fatigue despite thyroid hormone replacement. She said maybe a slight improvement. She used to be freezing with severe cold intolerance which improved with thyroid treatment. She said she is also going through menopause, no periods for the past  year and having hot flashes.  She has a stable wt over the past 5 years and having difficullty loosing wt.     She has KIYA, she started on CPAP 2 month and still no improvement with her fatigue. ROS is positive for diffuse pain and had a work up by rheumatology which was negative; probably fibromyalgia. Occasional dizziness and lhd which attributed to her reading glasses. No muscle weakness. She said she always been an easy bruiser. No purple stretch marks. No facial rounding.     Interval hx: continues to have joint pain otherwise everything the same. wegovy was prescribed by her PCP not started yet.   Currently takes levothyroxine on empty stomach and waits at least 30 minutes before eating.  Does not take calcium or iron containing multivitamins within 4 hours of taking the levothyroxine tablet.  No known celiac disease.      Her sister has hypothyroidism, levothyroxine did not help, synthroid worked for her.    Family hx: hypothyroidism in her sister . Father and brother hyperthyroidism.   Social: she     Problem List     Patient Active Problem List   Diagnosis    CARDIOVASCULAR SCREENING; LDL GOAL LESS THAN 160    Mild major depression (H)    Family history of thyroid problem    ASCUS of cervix with negative high risk HPV    KIYA (obstructive sleep apnea)- moderate (AHI 18)    Obesity (BMI 35.0-39.9) with comorbidity (H)    Chronic pain of both knees    Trochanteric bursitis of both hips    Major depressive disorder, recurrent episode, mild (H)        Medications     Current Outpatient Medications   Medication    Cholecalciferol (VITAMIN D-3) 5000 units TABS    citalopram (CELEXA) 40 MG tablet    levothyroxine (SYNTHROID/LEVOTHROID) 50 MCG tablet    liothyronine (CYTOMEL) 5 MCG tablet    Multiple Vitamin (MULTIVITAMINS PO)    Semaglutide-Weight Management (WEGOVY) 0.25 MG/0.5ML pen    [START ON 7/3/2023] Semaglutide-Weight Management (WEGOVY) 0.5 MG/0.5ML pen    cyclobenzaprine (FLEXERIL) 5 MG tablet     phenazopyridine (PYRIDIUM) 100 MG tablet     No current facility-administered medications for this visit.        Allergies     Allergies   Allergen Reactions    Septra Ds [Sulfamethoxazole W-Trimethoprim] Hives     Happened at end of course from 12/01/2011-12/    Amoxicillin     Erythromycin      hives    Penicillins      hives       Medical / Surgical History     Past Medical History:   Diagnosis Date    ASCUS of cervix with negative high risk HPV 6/17/16 6/17/16 ASCUS/neg HR HPV.     Depressive disorder 1997    Depressive disorder, not elsewhere classified     Depression (non-psychotic)    Endometriosis, site unspecified     Endometriosis    Female infertility of unspecified origin     Female infertility    Thyroid disease      No past surgical history on file.    Social History     Social History     Socioeconomic History    Marital status:      Spouse name: james    Number of children: 0    Years of education: 18    Highest education level: Not on file   Occupational History    Occupation: manager     Employer: [employer]   Tobacco Use    Smoking status: Never    Smokeless tobacco: Never   Vaping Use    Vaping status: Never Used   Substance and Sexual Activity    Alcohol use: Yes     Comment: 1-2 drink per month    Drug use: No    Sexual activity: Yes     Partners: Male     Birth control/protection: Condom   Other Topics Concern    Parent/sibling w/ CABG, MI or angioplasty before 65F 55M? No   Social History Narrative    Not on file     Social Determinants of Health     Financial Resource Strain: Not on file   Food Insecurity: Not on file   Transportation Needs: Not on file   Physical Activity: Not on file   Stress: Not on file   Social Connections: Not on file   Intimate Partner Violence: Not on file   Housing Stability: Not on file       Family History     Family History   Problem Relation Age of Onset    C.A.D. Paternal Grandfather     Cerebrovascular Disease Paternal  Grandfather     JOSE Maternal Grandfather     Hypertension Mother     Hypertension Maternal Grandmother     Prostate Cancer Father     Thyroid Disease Sister     Hypothyroidism Sister     No Known Problems Brother     Diabetes No family hx of     Breast Cancer No family hx of     Cancer - colorectal No family hx of        ROS     12 ROS completed, pertinent positive and negative in HPI  Has some common cold otherwise as above  Answers for HPI/ROS submitted by the patient on 6/12/2023  General Symptoms: No  Skin Symptoms: No  HENT Symptoms: Yes  EYE SYMPTOMS: No  HEART SYMPTOMS: No  LUNG SYMPTOMS: Yes  INTESTINAL SYMPTOMS: No  URINARY SYMPTOMS: No  GYNECOLOGIC SYMPTOMS: No  BREAST SYMPTOMS: No  SKELETAL SYMPTOMS: Yes  BLOOD SYMPTOMS: No  NERVOUS SYSTEM SYMPTOMS: Yes  MENTAL HEALTH SYMPTOMS: Yes  Ear pain: No  Ear discharge: No  Hearing loss: No  Tinnitus: No  Nosebleeds: No  Congestion: Yes  Sinus pain: No  Trouble swallowing: No   Voice hoarseness: Yes  Mouth sores: No  Sore throat: Yes  Tooth pain: No  Gum tenderness: No  Bleeding gums: No  Change in taste: No  Change in sense of smell: No  Dry mouth: Yes  Hearing aid used: No  Neck lump: No  Cough: Yes  Sputum or phlegm: Yes  Coughing up blood: No  Difficulty breating or shortness of breath: No  Snoring: Yes  Wheezing: No  Difficulty breathing on exertion: No  Nighttime Cough: Yes  Difficulty breathing when lying flat: No  Back pain: Yes  Muscle aches: Yes  Neck pain: No  Swollen joints: No  Joint pain: Yes  Bone pain: Yes  Muscle cramps: No  Muscle weakness: No  Joint stiffness: Yes  Bone fracture: No  Trouble with coordination: No  Dizziness or trouble with balance: No  Fainting or black-out spells: No  Memory loss: No  Headache: Yes  Seizures: No  Speech problems: No  Tingling: No  Tremor: No  Weakness: No  Difficulty walking: No  Paralysis: No  Numbness: No  Nervous or Anxious: Yes  Depression: Yes  Trouble sleeping: No  Trouble thinking or concentrating:  No  Mood changes: No  Panic attacks: No      Physical Exam   LMP  (LMP Unknown)      General: Comfortable, no obvious distress, normal body habitus  Eyes: Sclera anicteric, moist conjunctiva  HENT: Atraumatic, oropharynx clear, moist mucous membranes with no mucosal ulcerations  Neck: Trachea midline, supple. Thyroid: Thyroid is normal in size and texture  CV: normal rate.   Resp:  good effort, no evidence of loud wheezing  Abdomen:  obese, non distended.   Skin: No rashes, lesions, or subcutaneous nodules on exposed skin.   Psych: Alert and oriented x 3. Appropriate affect, good insight  Extremities: No peripheral edema  Musculoskeletal: Appropriate muscle bulk and strength  Neuro: Moves all four extremities. No focal deficits on limited exam. Gait normal.     Labs/Imaging     Pertinent Labs were reviewed and updated in EPIC and discussed briefly.  Radiology Results were  reviewed and updated in EPIC and discussed briefly.    Summary of recent findings:   Lab Results   Component Value Date    A1C 5.0 03/09/2021       TSH   Date Value Ref Range Status   12/07/2022 2.13 0.30 - 4.20 uIU/mL Final   04/14/2022 1.87 0.40 - 4.00 mU/L Final   05/28/2021 2.42 0.40 - 4.00 mU/L Final   04/21/2021 2.79 0.40 - 4.00 mU/L Final   03/09/2021 3.69 0.40 - 4.00 mU/L Final   01/11/2019 3.16 0.40 - 4.00 mU/L Final   06/09/2016 3.22 0.40 - 4.00 mU/L Final     T4 Free   Date Value Ref Range Status   05/28/2021 1.12 0.76 - 1.46 ng/dL Final   04/21/2021 1.05 0.76 - 1.46 ng/dL Final   03/09/2021 0.91 0.76 - 1.46 ng/dL Final   01/11/2019 1.04 0.76 - 1.46 ng/dL Final     Free T4   Date Value Ref Range Status   12/07/2022 1.12 0.90 - 1.70 ng/dL Final   04/14/2022 1.09 0.76 - 1.46 ng/dL Final       Creatinine   Date Value Ref Range Status   03/09/2021 0.63 0.52 - 1.04 mg/dL Final       Recent Labs   Lab Test 04/14/22  0813 01/11/19  1511   CHOL 159 159   HDL 52 57   LDL 93 88   TRIG 72 70       No results found for: [id number], [id number],  [id number]    I personally reviewed the patient's outside records from [facility] EMR and Care Everywhere. Summary of pertinent findings in HPI.    Impression / Plan     1. Hypothyroidism  She never really had a period of overt hypothyroidism. All her TSH and FT4 in records wnl. She had low free t3 on 3/2021 and was started on thyroid replacement based on that. She is on lt4 at 50 mcg daily and liothironine 5 mg daily.  We discussed checking tft yearly or earlier if sx changes or significant weight on wegovy happened.    2. Obesity, BMI 38  Prescribed wegovy by PCP.   Following with PCP.    Test and/or medications prescribed today:  No orders of the defined types were placed in this encounter.        Follow up:yearly. Could graduate from endocrine and follow up with PCP.      Augie Trinidad MD  Endocrinology, Diabetes and Metabolism  [facility]

## 2023-06-14 ENCOUNTER — LAB (OUTPATIENT)
Dept: LAB | Facility: CLINIC | Age: 53
End: 2023-06-14
Payer: COMMERCIAL

## 2023-06-14 DIAGNOSIS — Z13.220 SCREENING FOR HYPERLIPIDEMIA: ICD-10-CM

## 2023-06-14 DIAGNOSIS — Z13.1 SCREENING FOR DIABETES MELLITUS: ICD-10-CM

## 2023-06-14 DIAGNOSIS — M25.50 MULTIPLE JOINT PAIN: ICD-10-CM

## 2023-06-14 LAB
ALBUMIN SERPL BCG-MCNC: 4.1 G/DL (ref 3.5–5.2)
ALP SERPL-CCNC: 121 U/L (ref 35–104)
ALT SERPL W P-5'-P-CCNC: 17 U/L (ref 0–50)
ANION GAP SERPL CALCULATED.3IONS-SCNC: 14 MMOL/L (ref 7–15)
AST SERPL W P-5'-P-CCNC: 20 U/L (ref 0–45)
BILIRUB SERPL-MCNC: 0.4 MG/DL
BUN SERPL-MCNC: 11.2 MG/DL (ref 6–20)
CALCIUM SERPL-MCNC: 9.1 MG/DL (ref 8.6–10)
CHLORIDE SERPL-SCNC: 101 MMOL/L (ref 98–107)
CHOLEST SERPL-MCNC: 165 MG/DL
CREAT SERPL-MCNC: 0.68 MG/DL (ref 0.51–0.95)
DEPRECATED HCO3 PLAS-SCNC: 25 MMOL/L (ref 22–29)
GFR SERPL CREATININE-BSD FRML MDRD: >90 ML/MIN/1.73M2
GLUCOSE SERPL-MCNC: 98 MG/DL (ref 70–99)
HDLC SERPL-MCNC: 49 MG/DL
LDLC SERPL CALC-MCNC: 96 MG/DL
NONHDLC SERPL-MCNC: 116 MG/DL
POTASSIUM SERPL-SCNC: 4.1 MMOL/L (ref 3.4–5.3)
PROT SERPL-MCNC: 7.3 G/DL (ref 6.4–8.3)
SODIUM SERPL-SCNC: 140 MMOL/L (ref 136–145)
TRIGL SERPL-MCNC: 101 MG/DL

## 2023-06-14 PROCEDURE — 82306 VITAMIN D 25 HYDROXY: CPT

## 2023-06-14 PROCEDURE — 80061 LIPID PANEL: CPT

## 2023-06-14 PROCEDURE — 82670 ASSAY OF TOTAL ESTRADIOL: CPT

## 2023-06-14 PROCEDURE — 36415 COLL VENOUS BLD VENIPUNCTURE: CPT

## 2023-06-14 PROCEDURE — 80053 COMPREHEN METABOLIC PANEL: CPT

## 2023-06-14 NOTE — TELEPHONE ENCOUNTER
Prior Authorization Not Needed per Insurance    Medication: Semaglutide-Weight Management (WEGOVY) 0.5 MG/0.5ML pen  Insurance Company: [employer] - Phone [phone] Fax [phone]  Expected CoPay:      Pharmacy Filling the Rx: [id number] [place] [facility] - [place], [place] - [address] [place] [address]  Pharmacy Notified: No  Patient Notified: No  The PA on file for the 0.25 mg pens covers all dosing of the medication.

## 2023-06-15 LAB
DEPRECATED CALCIDIOL+CALCIFEROL SERPL-MC: 37 UG/L (ref 20–75)
ESTRADIOL SERPL-MCNC: 10 PG/ML

## 2023-06-16 ENCOUNTER — MYC MEDICAL ADVICE (OUTPATIENT)
Dept: FAMILY MEDICINE | Facility: CLINIC | Age: 53
End: 2023-06-16
Payer: COMMERCIAL

## 2023-06-16 NOTE — TELEPHONE ENCOUNTER
Routing to PCP    Patient would like explanation of high alkaline phosphatase level of 121    Aaliyah Baldwin RN

## 2023-06-20 ENCOUNTER — MYC MEDICAL ADVICE (OUTPATIENT)
Dept: FAMILY MEDICINE | Facility: CLINIC | Age: 53
End: 2023-06-20
Payer: COMMERCIAL

## 2023-06-20 DIAGNOSIS — E66.01 MORBID OBESITY (H): ICD-10-CM

## 2023-06-20 DIAGNOSIS — R74.8 ALKALINE PHOSPHATASE ELEVATION: Primary | ICD-10-CM

## 2023-06-20 NOTE — TELEPHONE ENCOUNTER
Sent patient MyChart message addressing concern.  Will await patient response.    Natalie Garcia, INNA, APRN, CNP

## 2023-06-20 NOTE — TELEPHONE ENCOUNTER
Routing to PCP      Pt takes Wegovy for weight loss- looks like she just started this beginning of the month.    Both wegovy and Mounjaro are on back order.  Please advise.      Missy Starr RN

## 2023-06-24 DIAGNOSIS — E66.01 MORBID OBESITY (H): ICD-10-CM

## 2023-06-26 RX ORDER — SEMAGLUTIDE 1 MG/.5ML
INJECTION, SOLUTION SUBCUTANEOUS
Refills: 0 | OUTPATIENT
Start: 2023-06-26

## 2023-06-26 NOTE — TELEPHONE ENCOUNTER
"Message from patient 6/22/23 stated:    \"thanks so much for your response. It sounds like my insurance will cover Saxenda for a limited period of time so if you could send an Rx for that to the [facility] that would be great.\"    I send in the Rx for Saxenda on 6/23/23 and routing to team to start PA.    Natalie Garcia, DNP, APRN, CNP  "

## 2023-07-06 ENCOUNTER — TELEPHONE (OUTPATIENT)
Dept: FAMILY MEDICINE | Facility: CLINIC | Age: 53
End: 2023-07-06
Payer: COMMERCIAL

## 2023-07-06 DIAGNOSIS — E66.01 MORBID OBESITY (H): Primary | ICD-10-CM

## 2023-07-06 NOTE — TELEPHONE ENCOUNTER
[facility] #[id number] faxed a Patient Request New Rx    PHARMACY COMMENTS:  Please send script for been needles for Saxenda as we did not get it in.    Thank you,  [facility] Pharmacist

## 2023-07-27 ENCOUNTER — LAB (OUTPATIENT)
Dept: LAB | Facility: CLINIC | Age: 53
End: 2023-07-27
Payer: COMMERCIAL

## 2023-07-27 DIAGNOSIS — R74.8 ALKALINE PHOSPHATASE ELEVATION: ICD-10-CM

## 2023-07-27 LAB — ALP SERPL-CCNC: 107 U/L (ref 35–104)

## 2023-07-27 PROCEDURE — 36415 COLL VENOUS BLD VENIPUNCTURE: CPT

## 2023-07-27 PROCEDURE — 84075 ASSAY ALKALINE PHOSPHATASE: CPT

## 2023-08-07 ENCOUNTER — LAB (OUTPATIENT)
Dept: LAB | Facility: CLINIC | Age: 53
End: 2023-08-07
Payer: COMMERCIAL

## 2023-08-07 DIAGNOSIS — R74.8 ELEVATED ALKALINE PHOSPHATASE LEVEL: ICD-10-CM

## 2023-08-07 LAB
GGT SERPL-CCNC: 15 U/L (ref 5–36)
TSH SERPL DL<=0.005 MIU/L-ACNC: 1.74 UIU/ML (ref 0.3–4.2)

## 2023-08-07 PROCEDURE — 84080 ASSAY ALKALINE PHOSPHATASES: CPT | Mod: 90

## 2023-08-07 PROCEDURE — 36415 COLL VENOUS BLD VENIPUNCTURE: CPT

## 2023-08-07 PROCEDURE — 99000 SPECIMEN HANDLING OFFICE-LAB: CPT

## 2023-08-07 PROCEDURE — 84443 ASSAY THYROID STIM HORMONE: CPT

## 2023-08-07 PROCEDURE — 82977 ASSAY OF GGT: CPT

## 2023-08-08 LAB — ALP BONE SERPL-MCNC: 18.2 UG/L

## 2023-08-27 ENCOUNTER — MYC MEDICAL ADVICE (OUTPATIENT)
Dept: ENDOCRINOLOGY | Facility: CLINIC | Age: 53
End: 2023-08-27
Payer: COMMERCIAL

## 2023-08-27 DIAGNOSIS — R74.8 ELEVATED ALKALINE PHOSPHATASE LEVEL: Primary | ICD-10-CM

## 2023-08-29 ENCOUNTER — TRANSFERRED RECORDS (OUTPATIENT)
Dept: FAMILY MEDICINE | Facility: CLINIC | Age: 53
End: 2023-08-29
Payer: COMMERCIAL

## 2023-08-31 ENCOUNTER — LAB (OUTPATIENT)
Dept: LAB | Facility: CLINIC | Age: 53
End: 2023-08-31
Payer: COMMERCIAL

## 2023-08-31 ENCOUNTER — OFFICE VISIT (OUTPATIENT)
Dept: FAMILY MEDICINE | Facility: CLINIC | Age: 53
End: 2023-08-31
Payer: COMMERCIAL

## 2023-08-31 VITALS
TEMPERATURE: 97.7 F | HEIGHT: 68 IN | WEIGHT: 248.8 LBS | SYSTOLIC BLOOD PRESSURE: 118 MMHG | BODY MASS INDEX: 37.71 KG/M2 | DIASTOLIC BLOOD PRESSURE: 82 MMHG | RESPIRATION RATE: 26 BRPM | OXYGEN SATURATION: 95 % | HEART RATE: 74 BPM

## 2023-08-31 DIAGNOSIS — R74.8 ELEVATED ALKALINE PHOSPHATASE LEVEL: ICD-10-CM

## 2023-08-31 DIAGNOSIS — N64.4 BREAST PAIN, LEFT: Primary | ICD-10-CM

## 2023-08-31 LAB
CALCIUM SERPL-MCNC: 9.2 MG/DL (ref 8.6–10)
PHOSPHATE SERPL-MCNC: 2.8 MG/DL (ref 2.5–4.5)

## 2023-08-31 PROCEDURE — 83970 ASSAY OF PARATHORMONE: CPT

## 2023-08-31 PROCEDURE — 82310 ASSAY OF CALCIUM: CPT

## 2023-08-31 PROCEDURE — 84075 ASSAY ALKALINE PHOSPHATASE: CPT

## 2023-08-31 PROCEDURE — 36415 COLL VENOUS BLD VENIPUNCTURE: CPT

## 2023-08-31 PROCEDURE — 99213 OFFICE O/P EST LOW 20 MIN: CPT | Performed by: NURSE PRACTITIONER

## 2023-08-31 PROCEDURE — 84100 ASSAY OF PHOSPHORUS: CPT

## 2023-08-31 PROCEDURE — 82306 VITAMIN D 25 HYDROXY: CPT

## 2023-08-31 ASSESSMENT — ANXIETY QUESTIONNAIRES
4. TROUBLE RELAXING: NOT AT ALL
GAD7 TOTAL SCORE: 3
5. BEING SO RESTLESS THAT IT IS HARD TO SIT STILL: NOT AT ALL
6. BECOMING EASILY ANNOYED OR IRRITABLE: NOT AT ALL
IF YOU CHECKED OFF ANY PROBLEMS ON THIS QUESTIONNAIRE, HOW DIFFICULT HAVE THESE PROBLEMS MADE IT FOR YOU TO DO YOUR WORK, TAKE CARE OF THINGS AT HOME, OR GET ALONG WITH OTHER PEOPLE: SOMEWHAT DIFFICULT
7. FEELING AFRAID AS IF SOMETHING AWFUL MIGHT HAPPEN: NOT AT ALL
8. IF YOU CHECKED OFF ANY PROBLEMS, HOW DIFFICULT HAVE THESE MADE IT FOR YOU TO DO YOUR WORK, TAKE CARE OF THINGS AT HOME, OR GET ALONG WITH OTHER PEOPLE?: SOMEWHAT DIFFICULT
GAD7 TOTAL SCORE: 3
3. WORRYING TOO MUCH ABOUT DIFFERENT THINGS: SEVERAL DAYS
7. FEELING AFRAID AS IF SOMETHING AWFUL MIGHT HAPPEN: NOT AT ALL
2. NOT BEING ABLE TO STOP OR CONTROL WORRYING: SEVERAL DAYS
1. FEELING NERVOUS, ANXIOUS, OR ON EDGE: SEVERAL DAYS
GAD7 TOTAL SCORE: 3

## 2023-08-31 ASSESSMENT — PAIN SCALES - GENERAL: PAINLEVEL: MILD PAIN (3)

## 2023-08-31 ASSESSMENT — PATIENT HEALTH QUESTIONNAIRE - PHQ9
SUM OF ALL RESPONSES TO PHQ QUESTIONS 1-9: 2
SUM OF ALL RESPONSES TO PHQ QUESTIONS 1-9: 2
10. IF YOU CHECKED OFF ANY PROBLEMS, HOW DIFFICULT HAVE THESE PROBLEMS MADE IT FOR YOU TO DO YOUR WORK, TAKE CARE OF THINGS AT HOME, OR GET ALONG WITH OTHER PEOPLE: SOMEWHAT DIFFICULT

## 2023-08-31 ASSESSMENT — ENCOUNTER SYMPTOMS: BREAST MASS: 0

## 2023-08-31 NOTE — PROGRESS NOTES
Assessment & Plan     Breast pain, left  Left breast pain for over 1 month. Exam noted left nodule at 12 and 2-3 o'clock, similar to right breast, so unlikely anything concerning. Given symptoms, recommended mammo and US. Orders placed.     - MA Diagnostic Digital Left; Future  - US Breast Left Limited 1-3 Quadrants; Future    Ordering of each unique test         Patient Instructions   Breast mammography and ultrasound ordered. They will call you to schedule, but you can also call [phone] to schedule your appointment.           Maria Fernanda Aguilera [facility]    Celine Mandujano is a 53 year old, presenting for the following health issues:  Breast Problem (Tenderness/)        8/31/2023     9:51 AM   Additional Questions   Roomed by Malathi   Accompanied by none         8/31/2023     9:51 AM   Patient Reported Additional Medications   Patient reports taking the following new medications none       History of Present Illness       Reason for visit:  Breast tenderness  Symptom onset:  More than a month  Symptoms include:  Tenderness pain  Symptom intensity:  Mild  Symptom progression:  Staying the same  Had these symptoms before:  No  What makes it worse:  No  What makes it better:  No    She eats 2-3 servings of fruits and vegetables daily.She consumes 0 sweetened beverage(s) daily.She exercises with enough effort to increase her heart rate 9 or less minutes per day.  She exercises with enough effort to increase her heart rate 3 or less days per week.   She is taking medications regularly.         Additional provider notes: Patient presents in clinic for left breast tenderness to the lateral and upper area of breast for more than a month. Symptoms are mild and staying the same. Symptoms come and go at times. Sometimes movement makes it worse. Last mammo was 12/29/22 and was normal.     No injury. No new workouts or movements.     No LMP recorded (lmp unknown). Patient is  "postmenopausal.      Allergies   Allergen Reactions    Septra Ds [Sulfamethoxazole W-Trimethoprim] Hives     Happened at end of course from 12/01/2011-12/    Amoxicillin     Erythromycin      hives    Penicillins      hives       Current Outpatient Medications   Medication    Cholecalciferol (VITAMIN D-3) 5000 units TABS    citalopram (CELEXA) 40 MG tablet    insulin pen needle (32G X 4 MM) 32G X 4 MM miscellaneous    levothyroxine (SYNTHROID/LEVOTHROID) 50 MCG tablet    liothyronine (CYTOMEL) 5 MCG tablet    liraglutide - Weight Management (SAXENDA) 18 MG/3ML pen    Multiple Vitamin (MULTIVITAMINS PO)    cyclobenzaprine (FLEXERIL) 5 MG tablet    Semaglutide-Weight Management (WEGOVY) 0.25 MG/0.5ML pen    Semaglutide-Weight Management (WEGOVY) 0.5 MG/0.5ML pen     No current facility-administered medications for this visit.       Past Medical History:   Diagnosis Date    ASCUS of cervix with negative high risk HPV 6/17/16 6/17/16 ASCUS/neg HR HPV.     Depressive disorder 1997    Depressive disorder, not elsewhere classified     Depression (non-psychotic)    Endometriosis, site unspecified     Endometriosis    Female infertility of unspecified origin     Female infertility    Thyroid disease             Review of Systems   Breasts:  Positive for tenderness. Negative for breast mass and discharge.   All other systems reviewed and are negative.           Objective    /82 (BP Location: Right arm, Patient Position: Sitting, Cuff Size: Adult Large)   Pulse 74   Temp 97.7  F (36.5  C) (Tympanic)   Resp 26   Ht 1.727 m (5' 8\")   Wt 112.9 kg (248 lb 12.8 oz)   LMP  (LMP Unknown)   SpO2 95%   BMI 37.83 kg/m    Body mass index is 37.83 kg/m .  Physical Exam  Vitals reviewed.   Constitutional:       General: She is not in acute distress.     Appearance: Normal appearance. She is not ill-appearing or toxic-appearing.   Chest:       Musculoskeletal:         General: Normal range of motion.   Skin:     " General: Skin is warm and dry.   Neurological:      Mental Status: She is alert and oriented to person, place, and time.   Psychiatric:         Behavior: Behavior normal.

## 2023-08-31 NOTE — PATIENT INSTRUCTIONS
Breast mammography and ultrasound ordered. They will call you to schedule, but you can also call [phone] to schedule your appointment.

## 2023-09-01 LAB
ALP SERPL-CCNC: 102 U/L (ref 35–104)
PTH-INTACT SERPL-MCNC: 30 PG/ML (ref 15–65)

## 2023-09-02 LAB
DEPRECATED CALCIDIOL+CALCIFEROL SERPL-MC: <40 UG/L (ref 20–75)
VITAMIN D2 SERPL-MCNC: <5 UG/L
VITAMIN D3 SERPL-MCNC: 35 UG/L

## 2023-09-03 DIAGNOSIS — F33.0 MAJOR DEPRESSIVE DISORDER, RECURRENT EPISODE, MILD (H): ICD-10-CM

## 2023-09-04 RX ORDER — CITALOPRAM HYDROBROMIDE 40 MG/1
40 TABLET ORAL DAILY
Qty: 90 TABLET | Refills: 0 | Status: SHIPPED | OUTPATIENT
Start: 2023-09-04 | End: 2023-12-13

## 2023-09-05 DIAGNOSIS — E66.01 MORBID OBESITY (H): ICD-10-CM

## 2023-09-05 NOTE — TELEPHONE ENCOUNTER
Routing to provider    Pt states she is on 30mg     RN assisted in scheduling appointment for follow-up     Patient verbalized understanding and in agreement with plan of care.     Chela Diaz RN

## 2023-09-05 NOTE — TELEPHONE ENCOUNTER
Prescription refill was for initial dose with titrating up instructions.  Please contact patient to find out what dose she is on and needs refilling of.  She should also schedule a follow up visit - can be virtual or in person - to follow up on weight management/check response to starting Saxenda.    Natalie Garcia, DNP, APRN, CNP

## 2023-09-12 ENCOUNTER — ANCILLARY PROCEDURE (OUTPATIENT)
Dept: MAMMOGRAPHY | Facility: CLINIC | Age: 53
End: 2023-09-12
Attending: NURSE PRACTITIONER
Payer: COMMERCIAL

## 2023-09-12 ENCOUNTER — VIRTUAL VISIT (OUTPATIENT)
Dept: FAMILY MEDICINE | Facility: CLINIC | Age: 53
End: 2023-09-12
Payer: COMMERCIAL

## 2023-09-12 DIAGNOSIS — N64.4 BREAST PAIN, LEFT: ICD-10-CM

## 2023-09-12 DIAGNOSIS — E66.01 MORBID OBESITY (H): ICD-10-CM

## 2023-09-12 PROCEDURE — 77065 DX MAMMO INCL CAD UNI: CPT | Mod: LT | Performed by: STUDENT IN AN ORGANIZED HEALTH CARE EDUCATION/TRAINING PROGRAM

## 2023-09-12 PROCEDURE — 76642 ULTRASOUND BREAST LIMITED: CPT | Mod: LT | Performed by: STUDENT IN AN ORGANIZED HEALTH CARE EDUCATION/TRAINING PROGRAM

## 2023-09-12 PROCEDURE — 99441 PR PHYSICIAN TELEPHONE EVALUATION 5-10 MIN: CPT | Mod: 93 | Performed by: NURSE PRACTITIONER

## 2023-09-12 PROCEDURE — G0279 TOMOSYNTHESIS, MAMMO: HCPCS | Performed by: STUDENT IN AN ORGANIZED HEALTH CARE EDUCATION/TRAINING PROGRAM

## 2023-09-12 NOTE — PROGRESS NOTES
Nazia is a 53 year old who is being evaluated via a billable telephone visit.      What phone number would you like to be contacted at?     [phone]     How would you like to obtain your AVS? Stu    Distant Location (provider location):  Off-site    Assessment & Plan     Obesity (BMI 35.0-39.9) with comorbidity (H)  Chronic, stable, continue current treatment.    - Continue liraglutide - Weight Management (SAXENDA) 18 MG/3ML pen; Inject 3 mg Subcutaneous daily  - insulin pen needle (32G X 4 MM) 32G X 4 MM miscellaneous; Use 1 pen needles daily or as directed.  Initial weight 255 pounds and patient reports down 9 pounds (would put her 246 pounds which is a 3.5% weight loss so far).  She is having side effects that are tolerable.  She will continue with current treatment.                  Natalie Garcia NP  [facility]   Nazia is a 53 year old, presenting for the following health issues:  Recheck Medication (Weight loss medication, /Using for 2 months. )        9/12/2023     1:35 PM   Additional Questions   Roomed by Brook PATEL     Medication Followup of  Weight loss medication Saxenda 3 mg subcutaneous daily  Taking Medication as prescribed: yes, beginning weight 255 pounds and she reports loss of 9-10 lbs since she started medication mid July 2023  Side Effects:  Yes  Medication Helping Symptoms:  yes      How many servings of fruits and vegetables do you eat daily?  2-3  On average, how many sweetened beverages do you drink each day (Examples: soda, juice, sweet tea, etc.  Do NOT count diet or artificially sweetened beverages)?   0  How many days per week do you exercise enough to make your heart beat faster? 3 or less  How many minutes a day do you exercise enough to make your heart beat faster? 20 - 29  How many days per week do you miss taking your medication? 0    Says since mid July 2023 has been on the Saxenda.  Overall it has been going well.   Does get sick to her stomach.  Nausea and upset stomach side effect are tolerable.      Review of Systems   Constitutional, HEENT, cardiovascular, pulmonary, gi and gu systems are negative, except as otherwise noted.      Objective    Vitals - Patient Reported  Weight (Patient Reported): 111.6 kg (246 lb)      Vitals:  No vitals were obtained today due to virtual visit.    Physical Exam   healthy, alert, and no distress  PSYCH: Alert and oriented times 3; coherent speech, normal   rate and volume, able to articulate logical thoughts, able   to abstract reason, no tangential thoughts, no hallucinations   or delusions  Her affect is normal  RESP: No cough, no audible wheezing, able to talk in full sentences  Remainder of exam unable to be completed due to telephone visits              Phone call duration: 10 minutes

## 2023-09-18 ENCOUNTER — TELEPHONE (OUTPATIENT)
Dept: FAMILY MEDICINE | Facility: CLINIC | Age: 53
End: 2023-09-18
Payer: COMMERCIAL

## 2023-09-18 DIAGNOSIS — C43.59 MELANOMA OF ABDOMEN (H): Primary | ICD-10-CM

## 2023-09-18 NOTE — TELEPHONE ENCOUNTER
Patient's dermatology clinic calling to inform PCP of new diagnosis    Invasive melanoma on abdomen     Depth 0.7mm     Which is 0.1 mm off from needing a lymph node biopsy    So patient is being sent to Dr. Deutsch for localized excision and discussion of lymph node biopsy     Carmen Bob RN  [facility]

## 2023-09-19 NOTE — TELEPHONE ENCOUNTER
Reviewed below, will await records from Dermatology for review.    Natalie Garcia, DNP, APRN, CNP

## 2023-09-20 PROBLEM — C43.59: Status: ACTIVE | Noted: 2023-08-29

## 2023-09-20 NOTE — TELEPHONE ENCOUNTER
Received dermatopathology report from [facility] - reviewed and sent for scanning into patient record.    Natalie Garcia, INNA, APRN, CNP

## 2023-10-05 ENCOUNTER — TRANSFERRED RECORDS (OUTPATIENT)
Dept: HEALTH INFORMATION MANAGEMENT | Facility: CLINIC | Age: 53
End: 2023-10-05
Payer: COMMERCIAL

## 2023-10-19 ENCOUNTER — OFFICE VISIT (OUTPATIENT)
Dept: OTOLARYNGOLOGY | Facility: OTHER | Age: 53
End: 2023-10-19
Payer: COMMERCIAL

## 2023-10-19 VITALS
HEIGHT: 68 IN | SYSTOLIC BLOOD PRESSURE: 116 MMHG | WEIGHT: 250 LBS | DIASTOLIC BLOOD PRESSURE: 68 MMHG | BODY MASS INDEX: 37.89 KG/M2 | TEMPERATURE: 97.3 F

## 2023-10-19 DIAGNOSIS — J34.3 NASAL TURBINATE HYPERTROPHY: ICD-10-CM

## 2023-10-19 DIAGNOSIS — J34.2 NASAL SEPTAL DEVIATION: ICD-10-CM

## 2023-10-19 DIAGNOSIS — H60.8X3 CHRONIC ECZEMATOUS OTITIS EXTERNA OF BOTH EARS: Primary | ICD-10-CM

## 2023-10-19 PROCEDURE — 99204 OFFICE O/P NEW MOD 45 MIN: CPT | Performed by: OTOLARYNGOLOGY

## 2023-10-19 RX ORDER — AZELASTINE 1 MG/ML
1 SPRAY, METERED NASAL 2 TIMES DAILY
Qty: 30 ML | Refills: 1 | Status: SHIPPED | OUTPATIENT
Start: 2023-10-19 | End: 2024-07-12

## 2023-10-19 RX ORDER — TRIAMCINOLONE ACETONIDE 1 MG/G
CREAM TOPICAL 2 TIMES DAILY
Qty: 30 G | Refills: 0 | Status: SHIPPED | OUTPATIENT
Start: 2023-10-19 | End: 2024-07-12

## 2023-10-19 ASSESSMENT — ENCOUNTER SYMPTOMS
DIZZINESS: 0
VOMITING: 0
SINUS PAIN: 0
BLURRED VISION: 0
TINGLING: 0
DOUBLE VISION: 0
BRUISES/BLEEDS EASILY: 0
PHOTOPHOBIA: 0
NAUSEA: 0
STRIDOR: 0
CONSTITUTIONAL NEGATIVE: 1
HEARTBURN: 0
HEADACHES: 0
TREMORS: 0
SORE THROAT: 0

## 2023-10-19 ASSESSMENT — PAIN SCALES - GENERAL: PAINLEVEL: MILD PAIN (2)

## 2023-10-19 NOTE — LETTER
10/19/2023         RE: Nazia Jasso  [address] [place] [address]        Dear Colleague,    Thank you for referring your patient, Nazia Jasso, to the [facility]. Please see a copy of my visit note below.    HPI    This is a 53 year old patient who felt a discomfort in her right tonsil region for several months. States nasal congestion and post nasal drip. Denies any sore throat, dysphagia, odynophagia, voice changes or facial pressure. She is on CPAP. No environmental allergies.       ENT Problem List    KIYA (obstructive sleep apnea)- moderate (AHI 18)  CARDIOVASCULAR SCREENING; LDL GOAL LESS THAN 160  Family history of thyroid problem  ASCUS of cervix with negative high risk HPV  Obesity (BMI 35.0-39.9) with comorbidity (H)  Chronic pain of both knees  Trochanteric bursitis of both hips  Major depressive disorder, recurrent episode, mild (H24)  Melanoma of abdomen (H)    Current Medications:  azelastine (ASTELIN) 0.1 % nasal spray  Cholecalciferol (VITAMIN D-3) 5000 units TABS  citalopram (CELEXA) 40 MG tablet  cyclobenzaprine (FLEXERIL) 5 MG tablet  insulin pen needle (32G X 4 MM) 32G X 4 MM miscellaneous    levothyroxine (SYNTHROID/LEVOTHROID) 50 MCG tablet    liothyronine (CYTOMEL) 5 MCG tablet  Multiple Vitamin (MULTIVITAMINS PO)  triamcinolone (KENALOG) 0.1 % external cream  liraglutide - Weight Management (SAXENDA) 18 MG/3ML pen  Semaglutide-Weight Management (WEGOVY) 0.25 MG/0.5ML pen  Semaglutide-Weight Management (WEGOVY) 0.5 MG/0.5ML pen        Review of Systems   Constitutional: Negative.    HENT:  Positive for congestion. Negative for ear discharge, ear pain, nosebleeds, sinus pain and sore throat.    Eyes:  Negative for blurred vision, double vision and photophobia.   Respiratory:  Negative for stridor.    Gastrointestinal:  Negative for heartburn, nausea and vomiting.   Skin: Negative.    Neurological:  Negative for dizziness,  tingling, tremors and headaches.   Endo/Heme/Allergies:  Negative for environmental allergies. Does not bruise/bleed easily.         Physical Exam  Vitals and nursing note reviewed.   Constitutional:       Appearance: Normal appearance.   HENT:      Head: Normocephalic and atraumatic.      Right Ear: Tympanic membrane, ear canal and external ear normal. No middle ear effusion. There is no impacted cerumen.      Left Ear: Tympanic membrane, ear canal and external ear normal.  No middle ear effusion. There is no impacted cerumen.      Nose: Septal deviation, mucosal edema and congestion present. No rhinorrhea.      Right Turbinates: Not swollen.      Left Turbinates: Swollen.      Mouth/Throat:      Mouth: Mucous membranes are moist.      Pharynx: Oropharynx is clear. Uvula midline.      Tonsils: 2+ on the right. 2+ on the left.   Eyes:      Extraocular Movements: Extraocular movements intact.      Pupils: Pupils are equal, round, and reactive to light.   Neurological:      Mental Status: She is alert.         Tonsil stone at right inferior pole  Septum is deviated to the right  Eczematoid otitis externa bilaterally    A/P  This 53 year old patient is having tonsil stones, deviated septum, KIYA, and eczematoid otitis externa bilaterally. Options were discussed. I will give her a topical nasal Azelastine x2 for 6 weeks and see her in the f/up as needed. Re: Eczematoid otitis externa bilaterally, a mild steroid ointment was given as needed.      Again, thank you for allowing me to participate in the care of your patient.        Sincerely,        Vidhi Coronado MD

## 2023-10-19 NOTE — PROGRESS NOTES
HPI    This is a 53 year old patient who felt a discomfort in her right tonsil region for several months. States nasal congestion and post nasal drip. Denies any sore throat, dysphagia, odynophagia, voice changes or facial pressure. She is on CPAP. No environmental allergies.       ENT Problem List    KIYA (obstructive sleep apnea)- moderate (AHI 18)  CARDIOVASCULAR SCREENING; LDL GOAL LESS THAN 160  Family history of thyroid problem  ASCUS of cervix with negative high risk HPV  Obesity (BMI 35.0-39.9) with comorbidity (H)  Chronic pain of both knees  Trochanteric bursitis of both hips  Major depressive disorder, recurrent episode, mild (H24)  Melanoma of abdomen (H)    Current Medications:  azelastine (ASTELIN) 0.1 % nasal spray  Cholecalciferol (VITAMIN D-3) 5000 units TABS  citalopram (CELEXA) 40 MG tablet  cyclobenzaprine (FLEXERIL) 5 MG tablet  insulin pen needle (32G X 4 MM) 32G X 4 MM miscellaneous    levothyroxine (SYNTHROID/LEVOTHROID) 50 MCG tablet    liothyronine (CYTOMEL) 5 MCG tablet  Multiple Vitamin (MULTIVITAMINS PO)  triamcinolone (KENALOG) 0.1 % external cream  liraglutide - Weight Management (SAXENDA) 18 MG/3ML pen  Semaglutide-Weight Management (WEGOVY) 0.25 MG/0.5ML pen  Semaglutide-Weight Management (WEGOVY) 0.5 MG/0.5ML pen        Review of Systems   Constitutional: Negative.    HENT:  Positive for congestion. Negative for ear discharge, ear pain, nosebleeds, sinus pain and sore throat.    Eyes:  Negative for blurred vision, double vision and photophobia.   Respiratory:  Negative for stridor.    Gastrointestinal:  Negative for heartburn, nausea and vomiting.   Skin: Negative.    Neurological:  Negative for dizziness, tingling, tremors and headaches.   Endo/Heme/Allergies:  Negative for environmental allergies. Does not bruise/bleed easily.         Physical Exam  Vitals and nursing note reviewed.   Constitutional:       Appearance: Normal appearance.   HENT:      Head: Normocephalic and  atraumatic.      Right Ear: Tympanic membrane, ear canal and external ear normal. No middle ear effusion. There is no impacted cerumen.      Left Ear: Tympanic membrane, ear canal and external ear normal.  No middle ear effusion. There is no impacted cerumen.      Nose: Septal deviation, mucosal edema and congestion present. No rhinorrhea.      Right Turbinates: Not swollen.      Left Turbinates: Swollen.      Mouth/Throat:      Mouth: Mucous membranes are moist.      Pharynx: Oropharynx is clear. Uvula midline.      Tonsils: 2+ on the right. 2+ on the left.   Eyes:      Extraocular Movements: Extraocular movements intact.      Pupils: Pupils are equal, round, and reactive to light.   Neurological:      Mental Status: She is alert.         Tonsil stone at right inferior pole  Septum is deviated to the right  Eczematoid otitis externa bilaterally    A/P  This 53 year old patient is having tonsil stones, deviated septum, KIYA, and eczematoid otitis externa bilaterally. Options were discussed. I will give her a topical nasal Azelastine x2 for 6 weeks and see her in the f/up as needed. Re: Eczematoid otitis externa bilaterally, a mild steroid ointment was given as needed.

## 2023-10-23 ENCOUNTER — TRANSFERRED RECORDS (OUTPATIENT)
Dept: HEALTH INFORMATION MANAGEMENT | Facility: CLINIC | Age: 53
End: 2023-10-23
Payer: COMMERCIAL

## 2023-12-08 DIAGNOSIS — G47.33 OSA (OBSTRUCTIVE SLEEP APNEA): Primary | Chronic | ICD-10-CM

## 2023-12-13 DIAGNOSIS — F33.0 MAJOR DEPRESSIVE DISORDER, RECURRENT EPISODE, MILD (H): ICD-10-CM

## 2023-12-13 RX ORDER — CITALOPRAM HYDROBROMIDE 40 MG/1
40 TABLET ORAL DAILY
Qty: 90 TABLET | Refills: 0 | Status: SHIPPED | OUTPATIENT
Start: 2023-12-13 | End: 2024-01-07

## 2023-12-27 DIAGNOSIS — Z83.49 FAMILY HISTORY OF THYROID PROBLEM: ICD-10-CM

## 2023-12-27 RX ORDER — LEVOTHYROXINE SODIUM 50 UG/1
50 TABLET ORAL DAILY
Qty: 90 TABLET | Refills: 5 | Status: SHIPPED | OUTPATIENT
Start: 2023-12-27 | End: 2024-06-17

## 2024-01-06 DIAGNOSIS — F33.0 MAJOR DEPRESSIVE DISORDER, RECURRENT EPISODE, MILD (H): ICD-10-CM

## 2024-01-07 RX ORDER — CITALOPRAM HYDROBROMIDE 40 MG/1
40 TABLET ORAL DAILY
Qty: 90 TABLET | Refills: 0 | Status: SHIPPED | OUTPATIENT
Start: 2024-01-07

## 2024-02-12 DIAGNOSIS — R94.6 ABNORMAL FINDING ON THYROID FUNCTION TEST: ICD-10-CM

## 2024-02-12 RX ORDER — LIOTHYRONINE SODIUM 5 UG/1
5 TABLET ORAL DAILY
Qty: 90 TABLET | Refills: 3 | Status: SHIPPED | OUTPATIENT
Start: 2024-02-12 | End: 2024-06-17

## 2024-02-12 NOTE — TELEPHONE ENCOUNTER
[facility] Call Center    Phone Message    May a detailed message be left on voicemail: yes     Reason for Call: Medication Refill Request    Has the patient contacted the pharmacy for the refill? Yes   Name of medication being requested:    liothyronine (CYTOMEL) 5 MCG tablet     Provider who prescribed the medication: Augie Trinidad MD   Pharmacy: [facility] [id number] [place], [place] - [address] [facility] [address]   Date medication is needed: Patient is out    Patient requesting a 90 day supply sent to pharmacy if possible to help her get to her appointment, thank you!  Action Taken: Message routed to:  [facility] ([facility] PCC    Travel Screening: Not Applicable

## 2024-02-12 NOTE — TELEPHONE ENCOUNTER
liothyronine (CYTOMEL) 5 MCG tablet   90 tablet 3 12/7/2022 6/12/2023  [facility] [place]    Augie Trinidad MD  Endocrinology, Diabetes, and Metabolism      Routed because:  not on protocol.  request per pt call

## 2024-04-26 ENCOUNTER — PATIENT OUTREACH (OUTPATIENT)
Dept: CARE COORDINATION | Facility: CLINIC | Age: 54
End: 2024-04-26
Payer: COMMERCIAL

## 2024-05-10 ENCOUNTER — PATIENT OUTREACH (OUTPATIENT)
Dept: CARE COORDINATION | Facility: CLINIC | Age: 54
End: 2024-05-10
Payer: COMMERCIAL

## 2024-06-17 ENCOUNTER — VIRTUAL VISIT (OUTPATIENT)
Dept: ENDOCRINOLOGY | Facility: CLINIC | Age: 54
End: 2024-06-17
Payer: COMMERCIAL

## 2024-06-17 DIAGNOSIS — R94.6 ABNORMAL FINDING ON THYROID FUNCTION TEST: ICD-10-CM

## 2024-06-17 DIAGNOSIS — Z83.49 FAMILY HISTORY OF THYROID PROBLEM: ICD-10-CM

## 2024-06-17 PROCEDURE — 99214 OFFICE O/P EST MOD 30 MIN: CPT | Mod: 95 | Performed by: STUDENT IN AN ORGANIZED HEALTH CARE EDUCATION/TRAINING PROGRAM

## 2024-06-17 RX ORDER — LEVOTHYROXINE SODIUM 50 UG/1
50 TABLET ORAL DAILY
Qty: 90 TABLET | Refills: 3 | Status: SHIPPED | OUTPATIENT
Start: 2024-06-17

## 2024-06-17 RX ORDER — LIOTHYRONINE SODIUM 5 UG/1
2.5 TABLET ORAL 2 TIMES DAILY
Qty: 90 TABLET | Refills: 3 | Status: SHIPPED | OUTPATIENT
Start: 2024-06-17

## 2024-06-17 NOTE — LETTER
6/17/2024       RE: Nazia Jasso  [address] [place] [address]     Dear Colleague,    Thank you for referring your patient, Nazia Jasso, to the [facility] [place] [facility]. Please see a copy of my visit note below.    Endocrinology Clinic Visit         Video-Visit Details    Type of service:  Video Visit    Joined the call at 6/17/2024, 10:10:20 am.  Left the call at 6/17/2024, 10:59:51 am.      Originating Location (pt. Location): Home        Distant Location (provider location):  Off-site    Mode of Communication:  Video Conference via [employer]    Physician has received verbal consent for a Video Visit from the patient? Yes    I spent a total of 30 minutes on the date of encounter reviewing medical records, evaluating the patient, coordinating care and documenting in the EHR, as detailed above.        NAME:  Nazia Jasso  PCP:  Sarah Gregorio  MRN:  [id number]  Reason for Consult:  hypothyroidism  Requesting Provider:  Fidencio Zambrano    Chief Complaint     Chief Complaint   Patient presents with    Follow Up       History of Present Illness     Nazia Jasso is a 52 year old female who is seen in clinic for hypothyroidism. She established with me on 12/7/2022. Last seen 6/2023.    She has been struggling with fatigue for few years. She had normal TFT over the years. She was seen by Dr. Zambrano on 4/2021, she had normal TSH and FT4, low FT3. She was started on LT4 50 mcg daily and liothyronine 5 mg daily. Most recent TFTs wnl.     She continued to have fatigue despite thyroid hormone replacement. She said maybe a slight improvement. She used to be freezing with severe cold intolerance which improved with thyroid treatment. She was also going through menopause, no periods for the past year and having hot flashes.  She has a stable wt  over the past 5 years and having difficullty loosing wt.     She has KIYA, she started on CPAP 2 month and still no improvement with her fatigue. ROS is positive for diffuse pain and had a work up by rheumatology which was negative; probably fibromyalgia. Occasional dizziness and lhd which attributed to her reading glasses. No muscle weakness. She said she always been an easy bruiser. No purple stretch marks. No facial rounding.       Interval hx:  Last visit we discussed graduation to PCP.  Her labs after her visit were significant for elevated alk phos, normal liver enzymes including GGT. On repeat labs alk phos trended down to normal, bone alk phos wnl for postmenopausal. We discussed throught [employer] mobile messaging to hold off on bone scan and continue to monitor labs.    She reported knee pain bilateral L>R, especially when getting up. It is chronic and has been getting worse. She also has bilateral hip pain.    She is following with her PCP in regard of her obeisty. She was prescribed wegovy, but both wegovy and mounjaro not covered by her insurance.  Saxenda was covered by her insurance for a short duration, she lost 10 lbs while on it, had significant nausea on it.    She was started on wellbutrin and has been feeling way better in regards to her energy and fatigue.        Family hx: hypothyroidism in her sister . Father and brother hyperthyroidism.   Social: she     Problem List     Patient Active Problem List   Diagnosis    CARDIOVASCULAR SCREENING; LDL GOAL LESS THAN 160    Family history of thyroid problem    ASCUS of cervix with negative high risk HPV    KIYA (obstructive sleep apnea)- moderate (AHI 18)    Obesity (BMI 35.0-39.9) with comorbidity (H)    Chronic pain of both knees    Trochanteric bursitis of both hips    Major depressive disorder, recurrent episode, mild (H24)    Melanoma of abdomen (H)        Medications     Current Outpatient Medications   Medication Sig Dispense Refill    Cholecalciferol  (VITAMIN D-3) 5000 units TABS       citalopram (CELEXA) 40 MG tablet TAKE 1 TABLET BY MOUTH EVERY DAY 90 tablet 0    cyclobenzaprine (FLEXERIL) 5 MG tablet Take 1/2-1 tab prior to bedtime, prn.  If still symptomatic or if still not sleeping well and well-tolerated can increase by half tablet (half milligram increments) up to 2 tablets prior to bedtime, as needed. 45 tablet 2    levothyroxine (SYNTHROID/LEVOTHROID) 50 MCG tablet Take 1 tablet (50 mcg) by mouth daily 90 tablet 3    liothyronine (CYTOMEL) 5 MCG tablet Take 0.5 tablets (2.5 mcg) by mouth 2 times daily 90 tablet 3    Multiple Vitamin (MULTIVITAMINS PO)       azelastine (ASTELIN) 0.1 % nasal spray Spray 1 spray into both nostrils 2 times daily 30 mL 1    insulin pen needle (32G X 4 MM) 32G X 4 MM miscellaneous Use 1 pen needles daily or as directed. 100 each 3    liraglutide - Weight Management (SAXENDA) 18 MG/3ML pen Inject 3 mg Subcutaneous daily (Patient not taking: Reported on 10/19/2023) 45 mL 1    Semaglutide-Weight Management (WEGOVY) 0.25 MG/0.5ML pen Inject 0.25 mg Subcutaneous once a week For Week 1 through week 4 (Patient not taking: Reported on 8/31/2023) 2 mL 0    Semaglutide-Weight Management (WEGOVY) 0.5 MG/0.5ML pen Inject 0.5 mg Subcutaneous once a week For Week 5 through week 8 (Patient not taking: Reported on 8/31/2023) 2 mL 0    triamcinolone (KENALOG) 0.1 % external cream Apply topically 2 times daily 30 g 0     No current facility-administered medications for this visit.        Allergies     Allergies   Allergen Reactions    Septra Ds [Sulfamethoxazole W-Trimethoprim] Hives     Happened at end of course from 12/01/2011-12/    Amoxicillin     Erythromycin      hives    Penicillins      hives       Medical / Surgical History     Past Medical History:   Diagnosis Date    ASCUS of cervix with negative high risk HPV 06/17/2016 6/17/16 ASCUS/neg HR HPV.     Depressive disorder 1997    Endometriosis, site unspecified      Endometriosis    Female infertility of unspecified origin     Female infertility    Melanoma of abdomen (H) 08/29/2023    Thyroid disease      No past surgical history on file.    Social History     Social History     Socioeconomic History    Marital status:      Spouse name: james    Number of children: 0    Years of education: 18    Highest education level: Not on file   Occupational History    Occupation: manager     Employer: [employer]   Tobacco Use    Smoking status: Never     Passive exposure: Never    Smokeless tobacco: Never   Vaping Use    Vaping status: Never Used   Substance and Sexual Activity    Alcohol use: Yes     Comment: 1-2 drink per month    Drug use: No    Sexual activity: Yes     Partners: Male     Birth control/protection: Condom   Other Topics Concern    Parent/sibling w/ CABG, MI or angioplasty before 65F 55M? No   Social History Narrative    Not on file     Social Determinants of Health     Financial Resource Strain: Not on file   Food Insecurity: Not on file   Transportation Needs: Not on file   Physical Activity: Not on file   Stress: Not on file   Social Connections: Not on file   Interpersonal Safety: Not on file   Housing Stability: Not on file       Family History     Family History   Problem Relation Age of Onset    C.A.D. Paternal Grandfather     Cerebrovascular Disease Paternal Grandfather     C.A.D. Maternal Grandfather     Hypertension Mother     Hypertension Maternal Grandmother     Prostate Cancer Father     Thyroid Disease Sister     Hypothyroidism Sister     No Known Problems Brother     Diabetes No family hx of     Breast Cancer No family hx of     Cancer - colorectal No family hx of        ROS     12 ROS completed, pertinent positive and negative in HPI      Physical Exam   LMP  (LMP Unknown)      General: Comfortable, no obvious distress, normal body habitus  Eyes: Sclera anicteric, moist conjunctiva  HENT: Atraumatic, oropharynx clear, moist mucous  "membranes with no mucosal ulcerations  Neck: Trachea midline, supple. Thyroid: Thyroid is normal in size and texture  CV: normal rate.   Resp:  good effort, no evidence of loud wheezing  Abdomen:  obese, non distended.   Skin: No rashes, lesions, or subcutaneous nodules on exposed skin.   Psych: Alert and oriented x 3. Appropriate affect, good insight  Extremities: No peripheral edema  Musculoskeletal: Appropriate muscle bulk and strength  Neuro: Moves all four extremities. No focal deficits on limited exam. Gait normal.     Labs/Imaging     Pertinent Labs were reviewed and updated in EPIC and discussed briefly.  Radiology Results were  reviewed and updated in EPIC and discussed briefly.    Summary of recent findings:   Lab Results   Component Value Date    A1C 5.0 03/09/2021       TSH   Date Value Ref Range Status   08/07/2023 1.74 0.30 - 4.20 uIU/mL Final   12/07/2022 2.13 0.30 - 4.20 uIU/mL Final   04/14/2022 1.87 0.40 - 4.00 mU/L Final   05/28/2021 2.42 0.40 - 4.00 mU/L Final   04/21/2021 2.79 0.40 - 4.00 mU/L Final   03/09/2021 3.69 0.40 - 4.00 mU/L Final   01/11/2019 3.16 0.40 - 4.00 mU/L Final   06/09/2016 3.22 0.40 - 4.00 mU/L Final     T4 Free   Date Value Ref Range Status   05/28/2021 1.12 0.76 - 1.46 ng/dL Final   04/21/2021 1.05 0.76 - 1.46 ng/dL Final   03/09/2021 0.91 0.76 - 1.46 ng/dL Final   01/11/2019 1.04 0.76 - 1.46 ng/dL Final     Free T4   Date Value Ref Range Status   12/07/2022 1.12 0.90 - 1.70 ng/dL Final   04/14/2022 1.09 0.76 - 1.46 ng/dL Final       Creatinine   Date Value Ref Range Status   06/14/2023 0.68 0.51 - 0.95 mg/dL Final   03/09/2021 0.63 0.52 - 1.04 mg/dL Final       Recent Labs   Lab Test 04/14/22  0813 01/11/19  1511   CHOL 159 159   HDL 52 57   LDL 93 88   TRIG 72 70       No results found for: \"[id number]\", \"[id number]\", \"[id number]\"    I personally reviewed the patient's outside records from [facility] EMR and Care Everywhere. Summary of pertinent findings in HPI.    Impression " / Plan     1. Hypothyroidism  She never really had a period of overt hypothyroidism. All her TSH and FT4 in records wnl. She had low free t3 on 3/2021 and was started on thyroid replacement based on that. She is on lt4 at 50 mcg daily and liothironine 5 mg daily. Last TSH 8/2023 wnl.  She prefers to continue same replacement.  Due for TSH 8/2024.    2. Obesity, BMI 38  Following with PCP. Reviewed available wt loss medications other than glp1a including phentermine/topamax and naltrexone. She would to discuss further with PCP.     Test and/or medications prescribed today:  Orders Placed This Encounter   Procedures    TSH with free T4 reflex         Follow up: PRN. Could graduate from endocrine and follow up with PCP.        Augie Trinidad MD  Endocrinology, Diabetes and Metabolism  [facility]

## 2024-06-17 NOTE — NURSING NOTE
Is the patient currently in the state of [place]? YES    Visit mode:VIDEO    If the visit is dropped, the patient can be reconnected by: VIDEO VISIT: Text to cell phone:   Telephone Information:   Mobile [phone]       Will anyone else be joining the visit? NO  (If patient encounters technical issues they should call [phone])    How would you like to obtain your AVS? MyChart    Are changes needed to the allergy or medication list? Pt stated no changes to allergies and Pt stated no med changes    Are refills needed on medications prescribed by this physician? YES; levothyroxine and liothyronine    Reason for visit: Follow Up    Sneha Lowery LPN LPN

## 2024-06-17 NOTE — PROGRESS NOTES
Endocrinology Clinic Visit         Video-Visit Details    Type of service:  Video Visit    Joined the call at 6/17/2024, 10:10:20 am.  Left the call at 6/17/2024, 10:59:51 am.      Originating Location (pt. Location): Home        Distant Location (provider location):  Off-site    Mode of Communication:  Video Conference via [employer]    Physician has received verbal consent for a Video Visit from the patient? Yes    I spent a total of 30 minutes on the date of encounter reviewing medical records, evaluating the patient, coordinating care and documenting in the EHR, as detailed above.        NAME:  Nazia Jasso  PCP:  Sarah Gregorio  MRN:  [id number]  Reason for Consult:  hypothyroidism  Requesting Provider:  Fidencio Zambrano    Chief Complaint     Chief Complaint   Patient presents with    Follow Up       History of Present Illness     Nazia Jasso is a 52 year old female who is seen in clinic for hypothyroidism. She established with me on 12/7/2022. Last seen 6/2023.    She has been struggling with fatigue for few years. She had normal TFT over the years. She was seen by Dr. Zambrano on 4/2021, she had normal TSH and FT4, low FT3. She was started on LT4 50 mcg daily and liothyronine 5 mg daily. Most recent TFTs wnl.     She continued to have fatigue despite thyroid hormone replacement. She said maybe a slight improvement. She used to be freezing with severe cold intolerance which improved with thyroid treatment. She was also going through menopause, no periods for the past year and having hot flashes.  She has a stable wt over the past 5 years and having difficullty loosing wt.     She has KIYA, she started on CPAP 2 month and still no improvement with her fatigue. ROS is positive for diffuse pain and had a work up by rheumatology which was negative; probably fibromyalgia. Occasional dizziness and lhd which attributed to her reading glasses. No muscle weakness.  She said she always been an easy bruiser. No purple stretch marks. No facial rounding.       Interval hx:  Last visit we discussed graduation to PCP.  Her labs after her visit were significant for elevated alk phos, normal liver enzymes including GGT. On repeat labs alk phos trended down to normal, bone alk phos wnl for postmenopausal. We discussed throught [employer] messaging to hold off on bone scan and continue to monitor labs.    She reported knee pain bilateral L>R, especially when getting up. It is chronic and has been getting worse. She also has bilateral hip pain.    She is following with her PCP in regard of her obeisty. She was prescribed wegovy, but both wegovy and mounjaro not covered by her insurance.  Saxenda was covered by her insurance for a short duration, she lost 10 lbs while on it, had significant nausea on it.    She was started on wellbutrin and has been feeling way better in regards to her energy and fatigue.        Family hx: hypothyroidism in her sister . Father and brother hyperthyroidism.   Social: she     Problem List     Patient Active Problem List   Diagnosis    CARDIOVASCULAR SCREENING; LDL GOAL LESS THAN 160    Family history of thyroid problem    ASCUS of cervix with negative high risk HPV    KIYA (obstructive sleep apnea)- moderate (AHI 18)    Obesity (BMI 35.0-39.9) with comorbidity (H)    Chronic pain of both knees    Trochanteric bursitis of both hips    Major depressive disorder, recurrent episode, mild (H24)    Melanoma of abdomen (H)        Medications     Current Outpatient Medications   Medication Sig Dispense Refill    Cholecalciferol (VITAMIN D-3) 5000 units TABS       citalopram (CELEXA) 40 MG tablet TAKE 1 TABLET BY MOUTH EVERY DAY 90 tablet 0    cyclobenzaprine (FLEXERIL) 5 MG tablet Take 1/2-1 tab prior to bedtime, prn.  If still symptomatic or if still not sleeping well and well-tolerated can increase by half tablet (half milligram increments) up to 2 tablets prior to  bedtime, as needed. 45 tablet 2    levothyroxine (SYNTHROID/LEVOTHROID) 50 MCG tablet Take 1 tablet (50 mcg) by mouth daily 90 tablet 3    liothyronine (CYTOMEL) 5 MCG tablet Take 0.5 tablets (2.5 mcg) by mouth 2 times daily 90 tablet 3    Multiple Vitamin (MULTIVITAMINS PO)       azelastine (ASTELIN) 0.1 % nasal spray Spray 1 spray into both nostrils 2 times daily 30 mL 1    insulin pen needle (32G X 4 MM) 32G X 4 MM miscellaneous Use 1 pen needles daily or as directed. 100 each 3    liraglutide - Weight Management (SAXENDA) 18 MG/3ML pen Inject 3 mg Subcutaneous daily (Patient not taking: Reported on 10/19/2023) 45 mL 1    Semaglutide-Weight Management (WEGOVY) 0.25 MG/0.5ML pen Inject 0.25 mg Subcutaneous once a week For Week 1 through week 4 (Patient not taking: Reported on 8/31/2023) 2 mL 0    Semaglutide-Weight Management (WEGOVY) 0.5 MG/0.5ML pen Inject 0.5 mg Subcutaneous once a week For Week 5 through week 8 (Patient not taking: Reported on 8/31/2023) 2 mL 0    triamcinolone (KENALOG) 0.1 % external cream Apply topically 2 times daily 30 g 0     No current facility-administered medications for this visit.        Allergies     Allergies   Allergen Reactions    Septra Ds [Sulfamethoxazole W-Trimethoprim] Hives     Happened at end of course from 12/01/2011-12/    Amoxicillin     Erythromycin      hives    Penicillins      hives       Medical / Surgical History     Past Medical History:   Diagnosis Date    ASCUS of cervix with negative high risk HPV 06/17/2016 6/17/16 ASCUS/neg HR HPV.     Depressive disorder 1997    Endometriosis, site unspecified     Endometriosis    Female infertility of unspecified origin     Female infertility    Melanoma of abdomen (H) 08/29/2023    Thyroid disease      No past surgical history on file.    Social History     Social History     Socioeconomic History    Marital status:      Spouse name: james    Number of children: 0    Years of education: 18    Highest  education level: Not on file   Occupational History    Occupation: manager     Employer: [employer]   Tobacco Use    Smoking status: Never     Passive exposure: Never    Smokeless tobacco: Never   Vaping Use    Vaping status: Never Used   Substance and Sexual Activity    Alcohol use: Yes     Comment: 1-2 drink per month    Drug use: No    Sexual activity: Yes     Partners: Male     Birth control/protection: Condom   Other Topics Concern    Parent/sibling w/ CABG, MI or angioplasty before 65F 55M? No   Social History Narrative    Not on file     Social Determinants of Health     Financial Resource Strain: Not on file   Food Insecurity: Not on file   Transportation Needs: Not on file   Physical Activity: Not on file   Stress: Not on file   Social Connections: Not on file   Interpersonal Safety: Not on file   Housing Stability: Not on file       Family History     Family History   Problem Relation Age of Onset    C.A.D. Paternal Grandfather     Cerebrovascular Disease Paternal Grandfather     C.A.D. Maternal Grandfather     Hypertension Mother     Hypertension Maternal Grandmother     Prostate Cancer Father     Thyroid Disease Sister     Hypothyroidism Sister     No Known Problems Brother     Diabetes No family hx of     Breast Cancer No family hx of     Cancer - colorectal No family hx of        ROS     12 ROS completed, pertinent positive and negative in HPI      Physical Exam   LMP  (LMP Unknown)      General: Comfortable, no obvious distress, normal body habitus  Eyes: Sclera anicteric, moist conjunctiva  HENT: Atraumatic, oropharynx clear, moist mucous membranes with no mucosal ulcerations  Neck: Trachea midline, supple. Thyroid: Thyroid is normal in size and texture  CV: normal rate.   Resp:  good effort, no evidence of loud wheezing  Abdomen:  obese, non distended.   Skin: No rashes, lesions, or subcutaneous nodules on exposed skin.   Psych: Alert and oriented x 3. Appropriate affect, good  "insight  Extremities: No peripheral edema  Musculoskeletal: Appropriate muscle bulk and strength  Neuro: Moves all four extremities. No focal deficits on limited exam. Gait normal.     Labs/Imaging     Pertinent Labs were reviewed and updated in EPIC and discussed briefly.  Radiology Results were  reviewed and updated in EPIC and discussed briefly.    Summary of recent findings:   Lab Results   Component Value Date    A1C 5.0 03/09/2021       TSH   Date Value Ref Range Status   08/07/2023 1.74 0.30 - 4.20 uIU/mL Final   12/07/2022 2.13 0.30 - 4.20 uIU/mL Final   04/14/2022 1.87 0.40 - 4.00 mU/L Final   05/28/2021 2.42 0.40 - 4.00 mU/L Final   04/21/2021 2.79 0.40 - 4.00 mU/L Final   03/09/2021 3.69 0.40 - 4.00 mU/L Final   01/11/2019 3.16 0.40 - 4.00 mU/L Final   06/09/2016 3.22 0.40 - 4.00 mU/L Final     T4 Free   Date Value Ref Range Status   05/28/2021 1.12 0.76 - 1.46 ng/dL Final   04/21/2021 1.05 0.76 - 1.46 ng/dL Final   03/09/2021 0.91 0.76 - 1.46 ng/dL Final   01/11/2019 1.04 0.76 - 1.46 ng/dL Final     Free T4   Date Value Ref Range Status   12/07/2022 1.12 0.90 - 1.70 ng/dL Final   04/14/2022 1.09 0.76 - 1.46 ng/dL Final       Creatinine   Date Value Ref Range Status   06/14/2023 0.68 0.51 - 0.95 mg/dL Final   03/09/2021 0.63 0.52 - 1.04 mg/dL Final       Recent Labs   Lab Test 04/14/22  0813 01/11/19  1511   CHOL 159 159   HDL 52 57   LDL 93 88   TRIG 72 70       No results found for: \"[id number]\", \"[id number]\", \"[id number]\"    I personally reviewed the patient's outside records from [facility] EMR and Care Everywhere. Summary of pertinent findings in HPI.    Impression / Plan     1. Hypothyroidism  She never really had a period of overt hypothyroidism. All her TSH and FT4 in records wnl. She had low free t3 on 3/2021 and was started on thyroid replacement based on that. She is on lt4 at 50 mcg daily and liothironine 5 mg daily. Last TSH 8/2023 wnl.  She prefers to continue same replacement.  Due for TSH " 8/2024.    2. Obesity, BMI 38  Following with PCP. Reviewed available wt loss medications other than glp1a including phentermine/topamax and naltrexone. She would to discuss further with PCP.     Test and/or medications prescribed today:  Orders Placed This Encounter   Procedures    TSH with free T4 reflex         Follow up: PRN. Could graduate from endocrine and follow up with PCP.        Augie Trinidad MD  Endocrinology, Diabetes and Metabolism  [facility]

## 2024-07-12 ENCOUNTER — ANCILLARY PROCEDURE (OUTPATIENT)
Dept: GENERAL RADIOLOGY | Facility: CLINIC | Age: 54
End: 2024-07-12
Payer: COMMERCIAL

## 2024-07-12 ENCOUNTER — OFFICE VISIT (OUTPATIENT)
Dept: FAMILY MEDICINE | Facility: CLINIC | Age: 54
End: 2024-07-12
Payer: COMMERCIAL

## 2024-07-12 ENCOUNTER — NURSE TRIAGE (OUTPATIENT)
Dept: FAMILY MEDICINE | Facility: CLINIC | Age: 54
End: 2024-07-12

## 2024-07-12 VITALS
HEIGHT: 68 IN | BODY MASS INDEX: 38.85 KG/M2 | SYSTOLIC BLOOD PRESSURE: 123 MMHG | WEIGHT: 256.31 LBS | OXYGEN SATURATION: 96 % | TEMPERATURE: 98.6 F | DIASTOLIC BLOOD PRESSURE: 58 MMHG | HEART RATE: 64 BPM | RESPIRATION RATE: 16 BRPM

## 2024-07-12 DIAGNOSIS — R05.1 ACUTE COUGH: ICD-10-CM

## 2024-07-12 DIAGNOSIS — H66.91 RIGHT ACUTE OTITIS MEDIA: Primary | ICD-10-CM

## 2024-07-12 PROCEDURE — 99213 OFFICE O/P EST LOW 20 MIN: CPT

## 2024-07-12 PROCEDURE — 71046 X-RAY EXAM CHEST 2 VIEWS: CPT | Mod: TC | Performed by: RADIOLOGY

## 2024-07-12 RX ORDER — CEFDINIR 300 MG/1
300 CAPSULE ORAL 2 TIMES DAILY
Qty: 14 CAPSULE | Refills: 0 | Status: SHIPPED | OUTPATIENT
Start: 2024-07-12 | End: 2024-07-19

## 2024-07-12 RX ORDER — BUPROPION HYDROCHLORIDE 300 MG/1
300 TABLET ORAL EVERY MORNING
COMMUNITY
Start: 2024-05-02 | End: 2024-10-06 | Stop reason: SINTOL

## 2024-07-12 RX ORDER — FLUTICASONE PROPIONATE 50 MCG
1 SPRAY, SUSPENSION (ML) NASAL DAILY
Qty: 16 G | Refills: 0 | Status: SHIPPED | OUTPATIENT
Start: 2024-07-12

## 2024-07-12 ASSESSMENT — PATIENT HEALTH QUESTIONNAIRE - PHQ9
10. IF YOU CHECKED OFF ANY PROBLEMS, HOW DIFFICULT HAVE THESE PROBLEMS MADE IT FOR YOU TO DO YOUR WORK, TAKE CARE OF THINGS AT HOME, OR GET ALONG WITH OTHER PEOPLE: NOT DIFFICULT AT ALL
SUM OF ALL RESPONSES TO PHQ QUESTIONS 1-9: 2
SUM OF ALL RESPONSES TO PHQ QUESTIONS 1-9: 2

## 2024-07-12 NOTE — ASSESSMENT & PLAN NOTE
Patient presents today with infectious symptoms x 2 weeks.  On exam, she has evidence of acute otitis media on the right including a bulging and erythematous tympanic membrane.  She does have multiple antibiotic allergies, so we discussed treating with cefdinir and the possibility of a cross sensitivity reaction.  She will monitor and reach out if she develops a rash or other symptoms.  I would also recommend the addition of Flonase nasal spray 1 to 2 sprays daily given her upcoming air travel.  She can engage in other supportive care such as over-the-counter medications as they are helpful, humidified air, steam showers and warm compresses.  Given her history of pneumonia and cough greater than 2 weeks, I will also obtain a chest x-ray and patient will present to the [facility] for this.  I will plan to follow-up on results.  We discussed reasons to return to care including worsening of current symptoms, new concerning symptoms specifically fever, or persistence despite this plan.  Patient expressed understanding of and agreement with this.  All questions were answered.

## 2024-07-12 NOTE — PROGRESS NOTES
Assessment & Plan   Problem List Items Addressed This Visit       Right acute otitis media - Primary     Patient presents today with infectious symptoms x 2 weeks.  On exam, she has evidence of acute otitis media on the right including a bulging and erythematous tympanic membrane.  She does have multiple antibiotic allergies, so we discussed treating with cefdinir and the possibility of a cross sensitivity reaction.  She will monitor and reach out if she develops a rash or other symptoms.  I would also recommend the addition of Flonase nasal spray 1 to 2 sprays daily given her upcoming air travel.  She can engage in other supportive care such as over-the-counter medications as they are helpful, humidified air, steam showers and warm compresses.  Given her history of pneumonia and cough greater than 2 weeks, I will also obtain a chest x-ray and patient will present to the [facility] for this.  I will plan to follow-up on results.  We discussed reasons to return to care including worsening of current symptoms, new concerning symptoms specifically fever, or persistence despite this plan.  Patient expressed understanding of and agreement with this.  All questions were answered.         Relevant Medications    fluticasone (FLONASE) 50 MCG/ACT nasal spray    cefdinir (OMNICEF) 300 MG capsule     Other Visit Diagnoses       Acute cough        Relevant Medications    fluticasone (FLONASE) 50 MCG/ACT nasal spray    Other Relevant Orders    XR Chest 2 Views           Celine Mandujano is a 54 year old, presenting for the following health issues:  Cold Symptoms (For 2 weeks. Fluid in ears. Cough. H/o pneumonia. )        7/12/2024     1:25 PM   Additional Questions   Roomed by sac   Accompanied by self         7/12/2024     1:25 PM   Patient Reported Additional Medications   Patient reports taking the following new medications no     OV to discuss infectious symptoms x2 weeks  Initial symptoms included some mild URI  "such as fatigue. She took a COVID test initially and it was negative.  Current symptoms include bilateral ear plugging, R>L. Feels like she can hear fluid whooshing. The ears are not painful in general; sometimes she will have an uncomfortable sensation. Cough is productive; phlegm is discolored. No rhinorrhea or pharyngitis. No significant sinus congestion.   She denies fevers. She denies any wheezing. Has some chest congestion when she is about to cough. No chest pain or palpitations.     She endorses a history of pneumonia, most recently two or three years ago.   Has upcoming plane travel which concerns her  Home care has included rest, increasing fluids. She tried some ear wax drops which were not helpful.    History of Present Illness       Reason for visit:  Cough ear infection?  Symptom onset:  3-7 days ago    She eats 2-3 servings of fruits and vegetables daily.She consumes 1 sweetened beverage(s) daily.She exercises with enough effort to increase her heart rate 10 to 19 minutes per day.  She exercises with enough effort to increase her heart rate 4 days per week.   She is taking medications regularly.         Objective    /58 (BP Location: Left arm, Patient Position: Sitting, Cuff Size: Adult Large)   Pulse 64   Temp 98.6  F (37  C) (Oral)   Resp 16   Ht 1.727 m (5' 8\")   Wt 116.3 kg (256 lb 5 oz)   LMP  (LMP Unknown)   SpO2 96%   BMI 38.97 kg/m    Body mass index is 38.97 kg/m .    Physical Exam  Vitals and nursing note reviewed.   Constitutional:       General: She is not in acute distress.     Appearance: Normal appearance. She is not toxic-appearing.   HENT:      Right Ear: No drainage, swelling or tenderness. There is no impacted cerumen. Tympanic membrane is erythematous and bulging. Tympanic membrane is not perforated.      Left Ear: No drainage, swelling or tenderness. There is no impacted cerumen. Tympanic membrane is erythematous. Tympanic membrane is not perforated or bulging. "   Cardiovascular:      Rate and Rhythm: Normal rate and regular rhythm.      Heart sounds: Normal heart sounds.   Pulmonary:      Effort: Pulmonary effort is normal. No respiratory distress.      Breath sounds: No wheezing.      Comments: Intermittent, wet cough  Musculoskeletal:      Cervical back: Neck supple. No rigidity or tenderness.   Lymphadenopathy:      Cervical: No cervical adenopathy.   Neurological:      Mental Status: She is alert.           Signed Electronically by: LEXIS Dugan CNP

## 2024-07-12 NOTE — TELEPHONE ENCOUNTER
Received call from patient. She is calling in concern to a cold that she is experiencing. She is experiencing ear congestion as well.    Patient began experiencing symptoms two weeks ago, after traveling out of state. She had came back with the cold, and tested Negative for COVID.     Patient experiences ear pain at times in certain positions. She describes she has a respiratory cold.     Patient denies any additional symptoms.     Advised that patient should be seen in clinic within the next 3 days. Assisted with booking appointment on [facility] at [facility] as [facility]. Advised for any further worsening, patient should be seen sooner in . Patient verbalized understanding and has no further questions at this time.     KALEY Richards RN  [facility]    Reason for Disposition   Ear congestion present > 48 hours    Additional Information   Negative: Ear pain is main symptom   Negative: Hearing loss (complete or partial) is main symptom   Negative: Earwax is main concern   Negative: Has nasal allergies and they are acting up   Negative: Earache lasts > 1 hour   Negative: Pus or cloudy discharge from ear canal   Negative: Patient wants to be seen    Protocols used: Ear - Congestion-A-OH

## 2024-07-16 ENCOUNTER — MYC MEDICAL ADVICE (OUTPATIENT)
Dept: FAMILY MEDICINE | Facility: CLINIC | Age: 54
End: 2024-07-16
Payer: COMMERCIAL

## 2024-07-16 DIAGNOSIS — R05.1 ACUTE COUGH: Primary | ICD-10-CM

## 2024-07-16 NOTE — TELEPHONE ENCOUNTER
Patient update on symptoms and has further questions following 7/12/24 OV.          Rhiannon Alexis RN  [facility]

## 2024-07-17 RX ORDER — PREDNISONE 20 MG/1
20 TABLET ORAL DAILY
Qty: 5 TABLET | Refills: 0 | Status: SHIPPED | OUTPATIENT
Start: 2024-07-17 | End: 2024-07-22

## 2024-07-17 NOTE — TELEPHONE ENCOUNTER
Patient requesting message be sent to covering provider, Dr. Ang, and to PCP Aurelia Gonzalez CNP, for review/recommendations.      Rhiannon Alexis RN  [facility]

## 2024-07-22 RX ORDER — AZITHROMYCIN 250 MG/1
TABLET, FILM COATED ORAL
Qty: 6 TABLET | Refills: 0 | Status: SHIPPED | OUTPATIENT
Start: 2024-07-22 | End: 2024-07-27

## 2024-07-22 NOTE — TELEPHONE ENCOUNTER
Patient calling to request care team call pharmacy with authorization to dispense azithromyocin due to possible citalopram interaction.    [id number] @ [phone]

## 2024-07-22 NOTE — TELEPHONE ENCOUNTER
Routing to provider for review/advice on pharmacy request for authorization to dispense azithromycin due to possible citalopram interaction.         Rhiannon Alexis RN  [facility]

## 2024-07-23 NOTE — TELEPHONE ENCOUNTER
I authorize pharmacy to dispense azithromycin - patient has tolerated historically while on citalopram without issue.

## 2024-07-23 NOTE — TELEPHONE ENCOUNTER
Detailed vm left for pharmacy giving authorization to fill azithromycin. Encouraged call back with questions/concerns.

## 2024-08-11 ENCOUNTER — HEALTH MAINTENANCE LETTER (OUTPATIENT)
Age: 54
End: 2024-08-11

## 2024-08-13 ENCOUNTER — PATIENT OUTREACH (OUTPATIENT)
Dept: CARE COORDINATION | Facility: CLINIC | Age: 54
End: 2024-08-13
Payer: COMMERCIAL

## 2024-08-16 ENCOUNTER — ANCILLARY PROCEDURE (OUTPATIENT)
Dept: MAMMOGRAPHY | Facility: CLINIC | Age: 54
End: 2024-08-16
Attending: NURSE PRACTITIONER
Payer: COMMERCIAL

## 2024-08-16 DIAGNOSIS — Z12.31 VISIT FOR SCREENING MAMMOGRAM: ICD-10-CM

## 2024-08-16 PROCEDURE — 77067 SCR MAMMO BI INCL CAD: CPT | Mod: TC | Performed by: RADIOLOGY

## 2024-08-16 PROCEDURE — 77063 BREAST TOMOSYNTHESIS BI: CPT | Mod: TC | Performed by: RADIOLOGY

## 2024-08-29 ENCOUNTER — TRANSFERRED RECORDS (OUTPATIENT)
Dept: HEALTH INFORMATION MANAGEMENT | Facility: CLINIC | Age: 54
End: 2024-08-29
Payer: COMMERCIAL

## 2024-10-03 ENCOUNTER — OFFICE VISIT (OUTPATIENT)
Dept: FAMILY MEDICINE | Facility: CLINIC | Age: 54
End: 2024-10-03
Payer: COMMERCIAL

## 2024-10-03 VITALS
HEART RATE: 72 BPM | BODY MASS INDEX: 39.1 KG/M2 | DIASTOLIC BLOOD PRESSURE: 86 MMHG | SYSTOLIC BLOOD PRESSURE: 130 MMHG | RESPIRATION RATE: 16 BRPM | WEIGHT: 258 LBS | OXYGEN SATURATION: 94 % | TEMPERATURE: 98.9 F | HEIGHT: 68 IN

## 2024-10-03 DIAGNOSIS — F33.0 MAJOR DEPRESSIVE DISORDER, RECURRENT EPISODE, MILD (H): ICD-10-CM

## 2024-10-03 DIAGNOSIS — N90.7 EPIDERMAL CYST OF VULVA: ICD-10-CM

## 2024-10-03 DIAGNOSIS — C43.59 MELANOMA OF ABDOMEN (H): ICD-10-CM

## 2024-10-03 DIAGNOSIS — Z00.00 ROUTINE GENERAL MEDICAL EXAMINATION AT A HEALTH CARE FACILITY: Primary | ICD-10-CM

## 2024-10-03 DIAGNOSIS — E66.01 MORBID OBESITY (H): ICD-10-CM

## 2024-10-03 DIAGNOSIS — N95.1 VAGINAL DRYNESS, MENOPAUSAL: ICD-10-CM

## 2024-10-03 DIAGNOSIS — G89.29 CHRONIC PAIN OF LEFT KNEE: ICD-10-CM

## 2024-10-03 DIAGNOSIS — N95.1 MENOPAUSAL SYNDROME (HOT FLASHES): ICD-10-CM

## 2024-10-03 DIAGNOSIS — M25.562 CHRONIC PAIN OF LEFT KNEE: ICD-10-CM

## 2024-10-03 DIAGNOSIS — Z23 NEED FOR TDAP VACCINATION: ICD-10-CM

## 2024-10-03 LAB
ALBUMIN SERPL BCG-MCNC: 4.2 G/DL (ref 3.5–5.2)
ALP SERPL-CCNC: 135 U/L (ref 40–150)
ALT SERPL W P-5'-P-CCNC: 20 U/L (ref 0–50)
ANION GAP SERPL CALCULATED.3IONS-SCNC: 8 MMOL/L (ref 7–15)
AST SERPL W P-5'-P-CCNC: 22 U/L (ref 0–45)
BILIRUB SERPL-MCNC: 0.4 MG/DL
BUN SERPL-MCNC: 12.3 MG/DL (ref 6–20)
CALCIUM SERPL-MCNC: 9.2 MG/DL (ref 8.8–10.4)
CHLORIDE SERPL-SCNC: 104 MMOL/L (ref 98–107)
CHOLEST SERPL-MCNC: 156 MG/DL
CREAT SERPL-MCNC: 0.68 MG/DL (ref 0.51–0.95)
EGFRCR SERPLBLD CKD-EPI 2021: >90 ML/MIN/1.73M2
ERYTHROCYTE [DISTWIDTH] IN BLOOD BY AUTOMATED COUNT: 12.3 % (ref 10–15)
FASTING STATUS PATIENT QL REPORTED: YES
FASTING STATUS PATIENT QL REPORTED: YES
GLUCOSE SERPL-MCNC: 95 MG/DL (ref 70–99)
HCO3 SERPL-SCNC: 27 MMOL/L (ref 22–29)
HCT VFR BLD AUTO: 45 % (ref 35–47)
HDLC SERPL-MCNC: 54 MG/DL
HGB BLD-MCNC: 14.6 G/DL (ref 11.7–15.7)
LDLC SERPL CALC-MCNC: 87 MG/DL
MCH RBC QN AUTO: 29.4 PG (ref 26.5–33)
MCHC RBC AUTO-ENTMCNC: 32.4 G/DL (ref 31.5–36.5)
MCV RBC AUTO: 91 FL (ref 78–100)
NONHDLC SERPL-MCNC: 102 MG/DL
PLATELET # BLD AUTO: 230 10E3/UL (ref 150–450)
POTASSIUM SERPL-SCNC: 4.3 MMOL/L (ref 3.4–5.3)
PROT SERPL-MCNC: 7.5 G/DL (ref 6.4–8.3)
RBC # BLD AUTO: 4.96 10E6/UL (ref 3.8–5.2)
SODIUM SERPL-SCNC: 139 MMOL/L (ref 135–145)
TRIGL SERPL-MCNC: 74 MG/DL
TSH SERPL DL<=0.005 MIU/L-ACNC: 2.08 UIU/ML (ref 0.3–4.2)
VIT D+METAB SERPL-MCNC: 26 NG/ML (ref 20–50)
WBC # BLD AUTO: 5.7 10E3/UL (ref 4–11)

## 2024-10-03 PROCEDURE — 99213 OFFICE O/P EST LOW 20 MIN: CPT | Mod: 25 | Performed by: NURSE PRACTITIONER

## 2024-10-03 PROCEDURE — 82306 VITAMIN D 25 HYDROXY: CPT | Performed by: NURSE PRACTITIONER

## 2024-10-03 PROCEDURE — 99396 PREV VISIT EST AGE 40-64: CPT | Mod: 57 | Performed by: NURSE PRACTITIONER

## 2024-10-03 PROCEDURE — 80053 COMPREHEN METABOLIC PANEL: CPT | Performed by: NURSE PRACTITIONER

## 2024-10-03 PROCEDURE — 84443 ASSAY THYROID STIM HORMONE: CPT | Performed by: NURSE PRACTITIONER

## 2024-10-03 PROCEDURE — 90471 IMMUNIZATION ADMIN: CPT | Performed by: NURSE PRACTITIONER

## 2024-10-03 PROCEDURE — 85027 COMPLETE CBC AUTOMATED: CPT | Performed by: NURSE PRACTITIONER

## 2024-10-03 PROCEDURE — 80061 LIPID PANEL: CPT | Performed by: NURSE PRACTITIONER

## 2024-10-03 PROCEDURE — 90715 TDAP VACCINE 7 YRS/> IM: CPT | Performed by: NURSE PRACTITIONER

## 2024-10-03 PROCEDURE — 36415 COLL VENOUS BLD VENIPUNCTURE: CPT | Performed by: NURSE PRACTITIONER

## 2024-10-03 RX ORDER — DESVENLAFAXINE 50 MG/1
50 TABLET, FILM COATED, EXTENDED RELEASE ORAL EVERY MORNING
COMMUNITY
Start: 2024-09-03

## 2024-10-03 SDOH — HEALTH STABILITY: PHYSICAL HEALTH: ON AVERAGE, HOW MANY DAYS PER WEEK DO YOU ENGAGE IN MODERATE TO STRENUOUS EXERCISE (LIKE A BRISK WALK)?: 2 DAYS

## 2024-10-03 ASSESSMENT — PAIN SCALES - GENERAL: PAINLEVEL: NO PAIN (0)

## 2024-10-03 ASSESSMENT — PATIENT HEALTH QUESTIONNAIRE - PHQ9
SUM OF ALL RESPONSES TO PHQ QUESTIONS 1-9: 2
10. IF YOU CHECKED OFF ANY PROBLEMS, HOW DIFFICULT HAVE THESE PROBLEMS MADE IT FOR YOU TO DO YOUR WORK, TAKE CARE OF THINGS AT HOME, OR GET ALONG WITH OTHER PEOPLE: NOT DIFFICULT AT ALL
SUM OF ALL RESPONSES TO PHQ QUESTIONS 1-9: 2

## 2024-10-03 ASSESSMENT — SOCIAL DETERMINANTS OF HEALTH (SDOH): HOW OFTEN DO YOU GET TOGETHER WITH FRIENDS OR RELATIVES?: ONCE A WEEK

## 2024-10-03 NOTE — PATIENT INSTRUCTIONS
Genitourinary Syndrome of Menopause    Use vaginal moisturizer three times per week (Replens, Vagisil Moisturizer, Feminease, Moist Again, Luvena, or K-Y Liquibeads), or Coconut Oil.    Use vaginal lubricant with sex (water-based (such as Astroglide, Slippery Stuff, K-Y Jelly), silicone-based (such as Pjur, ID Millennium), or oil-based (such as Elegance Women's Lubricant).  Oil-based lubricants cause breakdown of latex condoms        Patient Education   Preventive Care Advice   This is general advice given by our system to help you stay healthy. However, your care team may have specific advice just for you. Please talk to your care team about your preventive care needs.  Nutrition  Eat 5 or more servings of fruits and vegetables each day.  Try wheat bread, brown rice and whole grain pasta (instead of white bread, rice, and pasta).  Get enough calcium and vitamin D. Check the label on foods and aim for 100% of the RDA (recommended daily allowance).  Lifestyle  Exercise at least 150 minutes each week  (30 minutes a day, 5 days a week).  Do muscle strengthening activities 2 days a week. These help control your weight and prevent disease.  No smoking.  Wear sunscreen to prevent skin cancer.  Have a dental exam and cleaning every 6 months.  Yearly exams  See your health care team every year to talk about:  Any changes in your health.  Any medicines your care team has prescribed.  Preventive care, family planning, and ways to prevent chronic diseases.  Shots (vaccines)   HPV shots (up to age 26), if you've never had them before.  Hepatitis B shots (up to age 59), if you've never had them before.  COVID-19 shot: Get this shot when it's due.  Flu shot: Get a flu shot every year.  Tetanus shot: Get a tetanus shot every 10 years.  Pneumococcal, hepatitis A, and RSV shots: Ask your care team if you need these based on your risk.  Shingles shot (for age 50 and up)  General health tests  Diabetes screening:  Starting at age 35,  Get screened for diabetes at least every 3 years.  If you are younger than age 35, ask your care team if you should be screened for diabetes.  Cholesterol test: At age 39, start having a cholesterol test every 5 years, or more often if advised.  Bone density scan (DEXA): At age 50, ask your care team if you should have this scan for osteoporosis (brittle bones).  Hepatitis C: Get tested at least once in your life.  STIs (sexually transmitted infections)  Before age 24: Ask your care team if you should be screened for STIs.  After age 24: Get screened for STIs if you're at risk. You are at risk for STIs (including HIV) if:  You are sexually active with more than one person.  You don't use condoms every time.  You or a partner was diagnosed with a sexually transmitted infection.  If you are at risk for HIV, ask about PrEP medicine to prevent HIV.  Get tested for HIV at least once in your life, whether you are at risk for HIV or not.  Cancer screening tests  Cervical cancer screening: If you have a cervix, begin getting regular cervical cancer screening tests starting at age 21.  Breast cancer scan (mammogram): If you've ever had breasts, begin having regular mammograms starting at age 40. This is a scan to check for breast cancer.  Colon cancer screening: It is important to start screening for colon cancer at age 45.  Have a colonoscopy test every 10 years (or more often if you're at risk) Or, ask your provider about stool tests like a FIT test every year or Cologuard test every 3 years.  To learn more about your testing options, visit:   .  For help making a decision, visit:   https://bit.ly/ei72468.  Prostate cancer screening test: If you have a prostate, ask your care team if a prostate cancer screening test (PSA) at age 55 is right for you.  Lung cancer screening: If you are a current or former smoker ages 50 to 80, ask your care team if ongoing lung cancer screenings are right for you.  For informational purposes  only. Not to replace the advice of your health care provider. Copyright   2023 [facility]. All rights reserved. Clinically reviewed by the [facility]. PLDT 880220 - REV 01/24.

## 2024-10-03 NOTE — NURSING NOTE
Prior to immunization administration, verified patients identity using patient s name and date of birth. Please see Immunization Activity for additional information.     Screening Questionnaire for Adult Immunization    Are you sick today?   No   Do you have allergies to medications, food, a vaccine component or latex?   No   Have you ever had a serious reaction after receiving a vaccination?   No   Do you have a long-term health problem with heart, lung, kidney, or metabolic disease (e.g., diabetes), asthma, a blood disorder, no spleen, complement component deficiency, a cochlear implant, or a spinal fluid leak?  Are you on long-term aspirin therapy?   No   Do you have cancer, leukemia, HIV/AIDS, or any other immune system problem?   No   Do you have a parent, brother, or sister with an immune system problem?   No   In the past 3 months, have you taken medications that affect  your immune system, such as prednisone, other steroids, or anticancer drugs; drugs for the treatment of rheumatoid arthritis, Crohn s disease, or psoriasis; or have you had radiation treatments?   No   Have you had a seizure, or a brain or other nervous system problem?   No   During the past year, have you received a transfusion of blood or blood    products, or been given immune (gamma) globulin or antiviral drug?   No   For women: Are you pregnant or is there a chance you could become       pregnant during the next month?   No   Have you received any vaccinations in the past 4 weeks?   No     Immunization questionnaire answers were all negative.      Patient instructed to remain in clinic for 15 minutes afterwards, and to report any adverse reactions.     Screening performed by Claudia Johnson MA on 10/3/2024 at 10:09 AM.

## 2024-10-03 NOTE — PROGRESS NOTES
"Preventive Care Visit  [facility]  Natalie Garcia NP, Nurse Practitioner - Family  Oct 3, 2024      Assessment & Plan     Routine general medical examination at a health care facility  - Comprehensive metabolic panel (BMP + Alb, Alk Phos, ALT, AST, Total. Bili, TP)  - Lipid panel reflex to direct LDL Fasting  - TSH with free T4 reflex  - CBC with platelets  - Vitamin D Deficiency    Chronic pain of left knee    - Physical Therapy  Referral; Future    Vaginal dryness, menopausal  Discussed with patient the indication and use of medication(s), risks/benefits, and potential adverse side effects.  Patient/guardian verbalized understanding and agreement with the plan.   - estradiol (ESTRACE) 0.1 MG/GM vaginal cream; Place 1 g vaginally three times a week. Start with 500 mg to 1 g/day intravaginally for 2 weeks, then 1 g three times per week    Menopausal syndrome (hot flashes)  Discussed possible Gabapentin at night to help with the bothersome nighttime hot flashes.  Patient wants to consider this and will contact me if she would like prescription for the Gabapentin.    Obesity (BMI 35.0-39.9) with comorbidity (H)  Counseled as below.    Major depressive disorder, recurrent episode, mild (H)  Known issue that I take into account for their medical decisions, no current exacerbations or new concerns.     Melanoma of abdomen (H)  History of.  Follows with Dermatology.    Epidermal cyst of vulva  Patient desires removal so will refer to OB/GYN.  - Ob/Gyn  Referral; Future    Need for Tdap vaccination    - TDAP 10-64Y (ADACEL,BOOSTRIX)          BMI  Estimated body mass index is 39.23 kg/m  as calculated from the following:    Height as of this encounter: 1.727 m (5' 8\").    Weight as of this encounter: 117 kg (258 lb).   Weight management plan: Discussed healthy diet and exercise guidelines    Counseling  Appropriate preventive services were addressed with this patient via " screening, questionnaire, or discussion as appropriate for fall prevention, nutrition, physical activity, Tobacco-use cessation, social engagement, weight loss and cognition.  Checklist reviewing preventive services available has been given to the patient.  Reviewed patient's diet, addressing concerns and/or questions.   She is at risk for lack of exercise and has been provided with information to increase physical activity for the benefit of her well-being.   She is at risk for psychosocial distress and has been provided with information to reduce risk.         Celine Mandujano is a 54 year old, presenting for the following:  Physical (Fasting )        10/3/2024     8:48 AM   Additional Questions   Roomed by Brook ANDERSON       Health Care Directive  Patient does not have a Health Care Directive or Living Will: Discussed advance care planning with patient; information given to patient to review.    HPI    Hot flashes.    Wellbutrin may be causing some hot flashes.  So it was discontinued.  Then went on Pristiq and caused hot flashes.  More at nighttime.  Vaginal dryness.    Cluster of multiple epidermoid cysts on the right labia majora, all measuring approx 3 mm in diameter     Couple years into menopause.    L knee, Physical Therapy in past made worse.              10/3/2024   General Health   How would you rate your overall physical health? (!) FAIR   Feel stress (tense, anxious, or unable to sleep) Only a little      (!) STRESS CONCERN      10/3/2024   Nutrition   Three or more servings of calcium each day? (!) NO   Diet: Regular (no restrictions)   How many servings of fruit and vegetables per day? (!) 2-3   How many sweetened beverages each day? 0-1            10/3/2024   Exercise   Days per week of moderate/strenous exercise 2 days      (!) EXERCISE CONCERN      10/3/2024   Social Factors   Frequency of gathering with friends or relatives Once a week   Worry food won't last until get money to buy more No    Food not last or not have enough money for food? No   Do you have housing? (Housing is defined as stable permanent housing and does not include staying ouside in a car, in a tent, in an abandoned building, in an overnight shelter, or couch-surfing.) Yes   Are you worried about losing your housing? No   Lack of transportation? No   Unable to get utilities (heat,electricity)? No            10/3/2024   Fall Risk   Fallen 2 or more times in the past year? No   Trouble with walking or balance? Yes              10/3/2024   Dental   Dentist two times every year? Yes            10/3/2024   TB Screening   Were you born outside of the [place]? No          Today's PHQ-9 Score:       10/3/2024     8:45 AM   PHQ-9 SCORE   PHQ-9 Total Score MyChart 2 (Minimal depression)   PHQ-9 Total Score 2         10/3/2024   Substance Use   Alcohol more than 3/day or more than 7/wk No   Do you use any other substances recreationally? No        Social History     Tobacco Use    Smoking status: Never     Passive exposure: Never    Smokeless tobacco: Never   Vaping Use    Vaping status: Never Used   Substance Use Topics    Alcohol use: Yes     Comment: 1-2 drink per month    Drug use: No           8/16/2024   LAST FHS-7 RESULTS   1st degree relative breast or ovarian cancer No   Any relative bilateral breast cancer No   Any male have breast cancer No   Any ONE woman have BOTH breast AND ovarian cancer No   Any woman with breast cancer before 50yrs No   2 or more relatives with breast AND/OR ovarian cancer No   2 or more relatives with breast AND/OR bowel cancer No           Mammogram Screening - Mammogram every 1-2 years updated in Health Maintenance based on mutual decision making        10/3/2024   STI Screening   New sexual partner(s) since last STI/HIV test? No        History of abnormal Pap smear: Yes- see problem list        Latest Ref Rng & Units 5/26/2023     1:45 PM 1/21/2020    10:53 AM 1/21/2020    10:43 AM   PAP / HPV   PAP  Negative  for Intraepithelial Lesion or Malignancy (NILM)      PAP (Historical)    NIL    HPV 16 DNA Negative Negative  Negative     HPV 18 DNA Negative Negative  Negative     Other HR HPV Negative Negative  Negative       ASCVD Risk   The 10-year ASCVD risk score (Malick HATCH, et al., 2019) is: 1.3%    Values used to calculate the score:      Age: 54 years      Sex: Female      Is Non- : No      Diabetic: No      Tobacco smoker: No      Systolic Blood Pressure: 130 mmHg      Is BP treated: No      HDL Cholesterol: 54 mg/dL      Total Cholesterol: 156 mg/dL           Reviewed and updated as needed this visit by Provider     Meds   Med Hx  Surg Hx  Fam Hx            BP Readings from Last 3 Encounters:   10/03/24 130/86   07/12/24 123/58   10/19/23 116/68    Wt Readings from Last 3 Encounters:   10/03/24 117 kg (258 lb)   07/12/24 116.3 kg (256 lb 5 oz)   10/19/23 113.4 kg (250 lb)                  Patient Active Problem List   Diagnosis    CARDIOVASCULAR SCREENING; LDL GOAL LESS THAN 160    Family history of thyroid problem    ASCUS of cervix with negative high risk HPV    KIYA (obstructive sleep apnea)- moderate (AHI 18)    Obesity (BMI 35.0-39.9) with comorbidity (H)    Chronic pain of both knees    Trochanteric bursitis of both hips    Major depressive disorder, recurrent episode, mild (H)    Melanoma of abdomen (H)    Abnormal finding on thyroid function test    Right acute otitis media     History reviewed. No pertinent surgical history.    Social History     Tobacco Use    Smoking status: Never     Passive exposure: Never    Smokeless tobacco: Never   Substance Use Topics    Alcohol use: Yes     Comment: 1-2 drink per month     Family History   Problem Relation Age of Onset    C.A.D. Paternal Grandfather     Cerebrovascular Disease Paternal Grandfather     C.A.D. Maternal Grandfather     Hypertension Mother     Hypertension Maternal Grandmother     Prostate Cancer Father     Thyroid  "Disease Sister     Hypothyroidism Sister     No Known Problems Brother     Diabetes No family hx of     Breast Cancer No family hx of     Cancer - colorectal No family hx of          Current Outpatient Medications   Medication Sig Dispense Refill    Cholecalciferol (VITAMIN D-3) 5000 units TABS       citalopram (CELEXA) 40 MG tablet TAKE 1 TABLET BY MOUTH EVERY DAY (Patient taking differently: Take 20 mg by mouth daily.) 90 tablet 0    cyclobenzaprine (FLEXERIL) 5 MG tablet Take 1/2-1 tab prior to bedtime, prn.  If still symptomatic or if still not sleeping well and well-tolerated can increase by half tablet (half milligram increments) up to 2 tablets prior to bedtime, as needed. 45 tablet 2    desvenlafaxine (PRISTIQ) 50 MG 24 hr tablet Take 50 mg by mouth every morning.      levothyroxine (SYNTHROID/LEVOTHROID) 50 MCG tablet Take 1 tablet (50 mcg) by mouth daily 90 tablet 3    liothyronine (CYTOMEL) 5 MCG tablet Take 0.5 tablets (2.5 mcg) by mouth 2 times daily 90 tablet 3    fluticasone (FLONASE) 50 MCG/ACT nasal spray Spray 1 spray into both nostrils daily (Patient not taking: Reported on 10/3/2024) 16 g 0    Multiple Vitamin (MULTIVITAMINS PO)  (Patient not taking: Reported on 10/3/2024)       Allergies   Allergen Reactions    Septra Ds [Sulfamethoxazole W-Trimethoprim] Hives     Happened at end of course from 12/01/2011-12/    Amoxicillin     Erythromycin      hives    Penicillins      hives          Objective    Exam  /86 (BP Location: Right arm, Patient Position: Sitting, Cuff Size: Adult Large)   Pulse 72   Temp 98.9  F (37.2  C) (Oral)   Resp 16   Ht 1.727 m (5' 8\")   Wt 117 kg (258 lb)   LMP  (LMP Unknown)   SpO2 94%   BMI 39.23 kg/m     Estimated body mass index is 39.23 kg/m  as calculated from the following:    Height as of this encounter: 1.727 m (5' 8\").    Weight as of this encounter: 117 kg (258 lb).    Physical Exam  GENERAL: healthy, alert, no distress, and obese  EYES: Eyes " grossly normal to inspection, PERRL and conjunctivae and sclerae normal  HENT: ear canals and TM's normal, nose and mouth without ulcers or lesions  NECK: no adenopathy, no asymmetry, masses, or scars  RESP: lungs clear to auscultation - no rales, rhonchi or wheezes  BREAST: normal without masses, tenderness or nipple discharge and no palpable axillary masses or adenopathy  CV: regular rate and rhythm, normal S1 S2, no S3 or S4, no murmur, click or rub, no peripheral edema   (female): vaginal mucosal atrophy.  Cluster of multiple epidermoid cysts on labia majora, all approximately 3 mm  SKIN: no suspicious lesions or rashes  NEURO: Normal strength and tone, mentation intact and speech normal  PSYCH: mentation appears normal, affect normal/bright        Signed Electronically by: Natalie Garcia NP    Answers submitted by the patient for this visit:  Patient Health Questionnaire (Submitted on 10/3/2024)  If you checked off any problems, how difficult have these problems made it for you to do your work, take care of things at home, or get along with other people?: Not difficult at all  PHQ9 TOTAL SCORE: 2

## 2024-10-05 ENCOUNTER — THERAPY VISIT (OUTPATIENT)
Dept: PHYSICAL THERAPY | Facility: CLINIC | Age: 54
End: 2024-10-05
Attending: NURSE PRACTITIONER
Payer: COMMERCIAL

## 2024-10-05 DIAGNOSIS — M25.562 CHRONIC PAIN OF LEFT KNEE: ICD-10-CM

## 2024-10-05 DIAGNOSIS — G89.29 CHRONIC PAIN OF LEFT KNEE: ICD-10-CM

## 2024-10-05 PROCEDURE — 97161 PT EVAL LOW COMPLEX 20 MIN: CPT | Mod: GP | Performed by: PHYSICAL THERAPIST

## 2024-10-05 PROCEDURE — 97110 THERAPEUTIC EXERCISES: CPT | Mod: GP | Performed by: PHYSICAL THERAPIST

## 2024-10-05 ASSESSMENT — ACTIVITIES OF DAILY LIVING (ADL)
AS_A_RESULT_OF_YOUR_KNEE_INJURY,_HOW_WOULD_YOU_RATE_YOUR_CURRENT_LEVEL_OF_DAILY_ACTIVITY?: ABNORMAL
STAND: ACTIVITY IS MINIMALLY DIFFICULT
GO UP STAIRS: ACTIVITY IS VERY DIFFICULT
RISE FROM A CHAIR: ACTIVITY IS FAIRLY DIFFICULT
LIMPING: I DO NOT HAVE THE SYMPTOM
RAW_SCORE: 35
SIT WITH YOUR KNEE BENT: ACTIVITY IS MINIMALLY DIFFICULT
WEAKNESS: THE SYMPTOM AFFECTS MY ACTIVITY MODERATELY
PLEASE_INDICATE_YOR_PRIMARY_REASON_FOR_REFERRAL_TO_THERAPY:: KNEE
KNEEL ON THE FRONT OF YOUR KNEE: ACTIVITY IS VERY DIFFICULT
SWELLING: I DO NOT HAVE THE SYMPTOM
PAIN: THE SYMPTOM AFFECTS MY ACTIVITY SEVERELY
WALK: ACTIVITY IS MINIMALLY DIFFICULT
KNEE_ACTIVITY_OF_DAILY_LIVING_SCORE: 50
KNEE_ACTIVITY_OF_DAILY_LIVING_SUM: 35
GO DOWN STAIRS: ACTIVITY IS VERY DIFFICULT
HOW_WOULD_YOU_RATE_THE_OVERALL_FUNCTION_OF_YOUR_KNEE_DURING_YOUR_USUAL_DAILY_ACTIVITIES?: ABNORMAL
KNEEL ON THE FRONT OF YOUR KNEE: ACTIVITY IS VERY DIFFICULT
AS_A_RESULT_OF_YOUR_KNEE_INJURY,_HOW_WOULD_YOU_RATE_YOUR_CURRENT_LEVEL_OF_DAILY_ACTIVITY?: ABNORMAL
HOW_WOULD_YOU_RATE_THE_CURRENT_FUNCTION_OF_YOUR_KNEE_DURING_YOUR_USUAL_DAILY_ACTIVITIES_ON_A_SCALE_FROM_0_TO_100_WITH_100_BEING_YOUR_LEVEL_OF_KNEE_FUNCTION_PRIOR_TO_YOUR_INJURY_AND_0_BEING_THE_INABILITY_TO_PERFORM_ANY_OF_YOUR_USUAL_DAILY_ACTIVITIES?: 60
STIFFNESS: THE SYMPTOM AFFECTS MY ACTIVITY MODERATELY
HOW_WOULD_YOU_RATE_THE_CURRENT_FUNCTION_OF_YOUR_KNEE_DURING_YOUR_USUAL_DAILY_ACTIVITIES_ON_A_SCALE_FROM_0_TO_100_WITH_100_BEING_YOUR_LEVEL_OF_KNEE_FUNCTION_PRIOR_TO_YOUR_INJURY_AND_0_BEING_THE_INABILITY_TO_PERFORM_ANY_OF_YOUR_USUAL_DAILY_ACTIVITIES?: 60
LIMPING: I DO NOT HAVE THE SYMPTOM
GO UP STAIRS: ACTIVITY IS VERY DIFFICULT
GIVING WAY, BUCKLING OR SHIFTING OF KNEE: THE SYMPTOM AFFECTS MY ACTIVITY MODERATELY
PAIN: THE SYMPTOM AFFECTS MY ACTIVITY SEVERELY
SQUAT: ACTIVITY IS VERY DIFFICULT
SQUAT: ACTIVITY IS VERY DIFFICULT
HOW_WOULD_YOU_RATE_THE_OVERALL_FUNCTION_OF_YOUR_KNEE_DURING_YOUR_USUAL_DAILY_ACTIVITIES?: ABNORMAL
SWELLING: I DO NOT HAVE THE SYMPTOM
GIVING WAY, BUCKLING OR SHIFTING OF KNEE: THE SYMPTOM AFFECTS MY ACTIVITY MODERATELY
STIFFNESS: THE SYMPTOM AFFECTS MY ACTIVITY MODERATELY
WEAKNESS: THE SYMPTOM AFFECTS MY ACTIVITY MODERATELY
SIT WITH YOUR KNEE BENT: ACTIVITY IS MINIMALLY DIFFICULT
WALK: ACTIVITY IS MINIMALLY DIFFICULT
STAND: ACTIVITY IS MINIMALLY DIFFICULT
GO DOWN STAIRS: ACTIVITY IS VERY DIFFICULT
RISE FROM A CHAIR: ACTIVITY IS FAIRLY DIFFICULT

## 2024-10-05 NOTE — PROGRESS NOTES
PHYSICAL THERAPY EVALUATION  Type of Visit: Evaluation        Fall Risk Screen:  Fall screen completed by: PT  Have you fallen 2 or more times in the past year?: No  Have you fallen and had an injury in the past year?: No  Is patient a fall risk?: No    Subjective   Patient reports to physical therapy for pain in L>R knee.  Notes that she has done PT in the past but didn't continue with it.  Stairs are the hardest thing to do with stepping up, sitting down, standing up, etc.  Walking seems to be okay.  B hip pain, worse with waking up due to sleeping on side.        Presenting condition or subjective complaint: knee pain and weakness  Date of onset: 10/03/24 (provider referral date)    Relevant medical history:     Dates & types of surgery: last fall melanoma removal    Prior diagnostic imaging/testing results: X-ray     Prior therapy history for the same diagnosis, illness or injury:        Prior Level of Function  Transfers: Independent  Ambulation: Independent  ADL: Independent  IADL: Driving, Finances, Housekeeping, Laundry, Meal preparation, Medication management, Work    Living Environment  Social support: With a significant other or spouse   Type of home: 2-story   Stairs to enter the home:         Ramp: No   Stairs inside the home: Yes 12 Is there a railing: Yes     Help at home: None  Equipment owned:       Employment: Yes   Hobbies/Interests: gardening reading    Patient goals for therapy: do stairs and get up from sitting without pain    Pain assessment: See objective evaluation for additional pain details     Objective   KNEE EVALUATION  PAIN: Pain Level at Rest: 1/10  Pain Level with Use: 8/10  Pain Location: knee  Pain Quality: Aching and Sharp  Pain Frequency: intermittent  Pain is Worst: daytime  Pain is Exacerbated By: stairs, bending knee, getting out of bed in the morning   Pain is Relieved By: rest  Pain Progression: Worsened  INTEGUMENTARY (edema, incisions):  No significant  swelling noted   POSTURE:   GAIT:  Weightbearing Status: WBAT  Assistive Device(s): None  Gait Deviations: Antalgic  Knee extension decreased L  BALANCE/PROPRIOCEPTION:   WEIGHTBEARING ALIGNMENT:   NON-WEIGHTBEARING ALIGNMENT:   ROM:   (Degrees) Left AROM Left PROM  Right AROM Right PROM   Knee Flexion 129      Knee Extension Lacking 5 deg       Pain:   End feel:     STRENGTH:   Pain: - none + mild ++ moderate +++ severe  Strength Scale: 0-5/5 Left Right   Knee Flexion 4+ 5-   Knee Extension 4- 5-   Quad Set 4+ 5-     FLEXIBILITY:   SPECIAL TESTS:    Left Right   Apley's (Meniscus) Negative  Negative    Grazyna's (Meniscus) Negative  Negative    Arsen's (ITB/TFL)     Patellar Apprehension Test Negative     Patella Tracking     Ligamentous Stability Negative  Negative    Anterior Drawer (ACL)     Posterior Drawer (PCL)     Prone Dial Test at 30 Deg and 90 Deg (PCL/PLC)     Valgus Stress Testing at 0 Deg and 30 Deg     Varus Stress Testing at 0 Deg and 30 Deg        FUNCTIONAL TESTS: SLS: L side < 10 sec   PALPATION:  TTP To medial knee joint, pes anserine, infrapatellar fat pad   JOINT MOBILITY:  decreased knee extension, posterior glide     Assessment & Plan   CLINICAL IMPRESSIONS  Medical Diagnosis: Chronic pain of left knee (M25.562, G89.29)    Treatment Diagnosis: L knee pain   Impression/Assessment: Patient is a 54 year old female with left knee complaints.  The following significant findings have been identified: Pain, Decreased ROM/flexibility, Decreased joint mobility, Decreased strength, Impaired gait, Impaired muscle performance, and Decreased activity tolerance. These impairments interfere with their ability to perform self care tasks, work tasks, recreational activities, household chores, and community mobility as compared to previous level of function.     Clinical Decision Making (Complexity):  Clinical Presentation: Stable/Uncomplicated  Clinical Presentation Rationale: based on medical and personal  factors listed in PT evaluation  Clinical Decision Making (Complexity): Low complexity    PLAN OF CARE  Treatment Interventions:  Modalities: Cryotherapy  Interventions: Manual Therapy, Neuromuscular Re-education, Therapeutic Activity, Therapeutic Exercise, Self-Care/Home Management    Long Term Goals     PT Goal 1  Goal Identifier: Lifting/bending  Goal Description: Patient will demonstrate lifting 25# item from floor to waist height with hip hinge strategy and no increase in pain x 5 reps to indicate ability to complete household tasks such as dressing, groceries, laundry, and yardwork.  Rationale: to maximize safety and independence with performance of ADLs and functional tasks  Target Date: 12/28/24      Frequency of Treatment: every 1-2 weeks  Duration of Treatment: 12 weeks    Recommended Referrals to Other Professionals:  No further referral needed   Education Assessment:   Learner/Method: Patient;No Barriers to Learning    Risks and benefits of evaluation/treatment have been explained.   Patient/Family/caregiver agrees with Plan of Care.     Evaluation Time:     PT Eval, Low Complexity Minutes (01307): 15   Present: Not applicable     Signing Clinician: Jenny Ochoa, PT

## 2024-10-06 RX ORDER — ESTRADIOL 0.1 MG/G
1 CREAM VAGINAL
Qty: 42.5 G | Refills: 3 | Status: SHIPPED | OUTPATIENT
Start: 2024-10-07

## 2024-10-17 ENCOUNTER — THERAPY VISIT (OUTPATIENT)
Dept: PHYSICAL THERAPY | Facility: CLINIC | Age: 54
End: 2024-10-17
Payer: COMMERCIAL

## 2024-10-17 DIAGNOSIS — G89.29 CHRONIC PAIN OF LEFT KNEE: Primary | ICD-10-CM

## 2024-10-17 DIAGNOSIS — M25.562 CHRONIC PAIN OF LEFT KNEE: Primary | ICD-10-CM

## 2024-10-17 PROCEDURE — 97110 THERAPEUTIC EXERCISES: CPT | Mod: GP | Performed by: PHYSICAL THERAPIST

## 2024-10-17 PROCEDURE — 97140 MANUAL THERAPY 1/> REGIONS: CPT | Mod: GP | Performed by: PHYSICAL THERAPIST

## 2024-11-09 ENCOUNTER — THERAPY VISIT (OUTPATIENT)
Dept: PHYSICAL THERAPY | Facility: CLINIC | Age: 54
End: 2024-11-09
Attending: NURSE PRACTITIONER
Payer: COMMERCIAL

## 2024-11-09 DIAGNOSIS — G89.29 CHRONIC PAIN OF LEFT KNEE: Primary | ICD-10-CM

## 2024-11-09 DIAGNOSIS — M25.562 CHRONIC PAIN OF LEFT KNEE: Primary | ICD-10-CM

## 2024-11-09 PROCEDURE — 97110 THERAPEUTIC EXERCISES: CPT | Mod: GP | Performed by: PHYSICAL THERAPIST

## 2024-11-09 PROCEDURE — 97530 THERAPEUTIC ACTIVITIES: CPT | Mod: GP | Performed by: PHYSICAL THERAPIST

## 2025-01-14 ENCOUNTER — VIRTUAL VISIT (OUTPATIENT)
Dept: FAMILY MEDICINE | Facility: CLINIC | Age: 55
End: 2025-01-14
Payer: COMMERCIAL

## 2025-01-14 DIAGNOSIS — G47.33 OSA (OBSTRUCTIVE SLEEP APNEA): Primary | Chronic | ICD-10-CM

## 2025-01-14 DIAGNOSIS — E66.01 MORBID OBESITY (H): ICD-10-CM

## 2025-01-14 PROCEDURE — 98006 SYNCH AUDIO-VIDEO EST MOD 30: CPT | Performed by: NURSE PRACTITIONER

## 2025-01-14 NOTE — PROGRESS NOTES
Nazia is a 54 year old who is being evaluated via a billable video visit.    How would you like to obtain your AVS? Mail a copy  If the video visit is dropped, the invitation should be resent by: Send to e-mail at: [email or web address]  Will anyone else be joining your video visit? No      Assessment & Plan     KIYA (obstructive sleep apnea)- moderate (AHI 18)  - Discussed step-wise approach to medication and recommend follow-up in 3 months  - tirzepatide-Weight Management (ZEPBOUND) 2.5 MG/0.5ML prefilled pen; Inject 0.5 mLs (2.5 mg) subcutaneously every 7 days for 4 doses.  - tirzepatide-Weight Management (ZEPBOUND) 5 MG/0.5ML prefilled pen; Inject 0.5 mLs (5 mg) subcutaneously every 7 days for 4 doses.  - tirzepatide-Weight Management (ZEPBOUND) 7.5 MG/0.5ML prefilled pen; Inject 0.5 mLs (7.5 mg) subcutaneously every 7 days for 4 doses.    Morbid obesity (H)  - She would like to begin Zepbound medication for weight loss management and improvement in sleep apnea. She was previously on Victoza and medication was no longer covered by insurance.   Initial weight 258 pounds (last documented 10/3/24)  - tirzepatide-Weight Management (ZEPBOUND) 2.5 MG/0.5ML prefilled pen; Inject 0.5 mLs (2.5 mg) subcutaneously every 7 days for 4 doses.  - tirzepatide-Weight Management (ZEPBOUND) 5 MG/0.5ML prefilled pen; Inject 0.5 mLs (5 mg) subcutaneously every 7 days for 4 doses.  - tirzepatide-Weight Management (ZEPBOUND) 7.5 MG/0.5ML prefilled pen; Inject 0.5 mLs (7.5 mg) subcutaneously every 7 days for 4 doses.                Subjective   Nazia is a 54 year old, presenting for the following health issues:  Follow Up (Discuss zepbound for sleep apnea /)    History of Present Illness       Reason for visit:  Apnea medication - Zepbound?   She is taking medications regularly.    Took weight loss medication a while ago and insurance stopped covering medication. She is currently using her CPAP and feels that she is getting  enough sleep but would like to improve her sleep and lose weight as well.                 Objective           Vitals:  No vitals were obtained today due to virtual visit.    Physical Exam   GENERAL: alert and no distress  EYES: Eyes grossly normal to inspection.  No discharge or erythema, or obvious scleral/conjunctival abnormalities.  RESP: No audible wheeze, cough, or visible cyanosis.    SKIN: Visible skin clear. No significant rash, abnormal pigmentation or lesions.  NEURO: Cranial nerves grossly intact.  Mentation and speech appropriate for age.  PSYCH: Appropriate affect, tone, and pace of words          Video-Visit Details  Joined the call at 1/14/2025, 12:48:42 pm.  Left the call at 1/14/2025, 1:05:30 pm.  You were on the call for 16 minutes 47 seconds .  Type of service:  Video Visit   Originating Location (pt. Location): Home    Distant Location (provider location):  On-site  Platform used for Video Visit: Jamison  Signed Electronically by: KRISHNA Collado FNP-S

## 2025-03-01 ENCOUNTER — HOSPITAL ENCOUNTER (EMERGENCY)
Facility: CLINIC | Age: 55
Discharge: HOME | End: 2025-03-01
Attending: FAMILY MEDICINE | Admitting: FAMILY MEDICINE
Payer: COMMERCIAL

## 2025-03-01 ENCOUNTER — NURSE TRIAGE (OUTPATIENT)
Dept: NURSING | Facility: CLINIC | Age: 55
End: 2025-03-01
Payer: COMMERCIAL

## 2025-03-01 ENCOUNTER — APPOINTMENT (OUTPATIENT)
Dept: GENERAL RADIOLOGY | Facility: CLINIC | Age: 55
End: 2025-03-01
Attending: FAMILY MEDICINE
Payer: COMMERCIAL

## 2025-03-01 ENCOUNTER — APPOINTMENT (OUTPATIENT)
Dept: ULTRASOUND IMAGING | Facility: CLINIC | Age: 55
End: 2025-03-01
Attending: FAMILY MEDICINE
Payer: COMMERCIAL

## 2025-03-01 ENCOUNTER — APPOINTMENT (OUTPATIENT)
Dept: CT IMAGING | Facility: CLINIC | Age: 55
End: 2025-03-01
Attending: FAMILY MEDICINE
Payer: COMMERCIAL

## 2025-03-01 VITALS
HEART RATE: 61 BPM | BODY MASS INDEX: 37.89 KG/M2 | RESPIRATION RATE: 18 BRPM | TEMPERATURE: 97.6 F | SYSTOLIC BLOOD PRESSURE: 124 MMHG | DIASTOLIC BLOOD PRESSURE: 61 MMHG | OXYGEN SATURATION: 99 % | HEIGHT: 68 IN | WEIGHT: 250 LBS

## 2025-03-01 DIAGNOSIS — R07.89 ATYPICAL CHEST PAIN: ICD-10-CM

## 2025-03-01 LAB
ALBUMIN SERPL BCG-MCNC: 3.9 G/DL (ref 3.5–5.2)
ALP SERPL-CCNC: 120 U/L (ref 40–150)
ALT SERPL W P-5'-P-CCNC: 21 U/L (ref 0–50)
ANION GAP SERPL CALCULATED.3IONS-SCNC: 9 MMOL/L (ref 7–15)
APTT PPP: 29 SECONDS (ref 22–38)
AST SERPL W P-5'-P-CCNC: 24 U/L (ref 0–45)
ATRIAL RATE - MUSE: 64 BPM
BASOPHILS # BLD AUTO: 0 10E3/UL (ref 0–0.2)
BASOPHILS NFR BLD AUTO: 1 %
BILIRUB SERPL-MCNC: 0.3 MG/DL
BUN SERPL-MCNC: 10.6 MG/DL (ref 6–20)
CALCIUM SERPL-MCNC: 9 MG/DL (ref 8.8–10.4)
CHLORIDE SERPL-SCNC: 106 MMOL/L (ref 98–107)
CREAT SERPL-MCNC: 0.6 MG/DL (ref 0.51–0.95)
D DIMER PPP FEU-MCNC: 0.83 UG/ML FEU (ref 0–0.5)
DIASTOLIC BLOOD PRESSURE - MUSE: NORMAL MMHG
EGFRCR SERPLBLD CKD-EPI 2021: >90 ML/MIN/1.73M2
EOSINOPHIL # BLD AUTO: 0.1 10E3/UL (ref 0–0.7)
EOSINOPHIL NFR BLD AUTO: 2 %
ERYTHROCYTE [DISTWIDTH] IN BLOOD BY AUTOMATED COUNT: 12.8 % (ref 10–15)
GLUCOSE SERPL-MCNC: 132 MG/DL (ref 70–99)
HCO3 SERPL-SCNC: 26 MMOL/L (ref 22–29)
HCT VFR BLD AUTO: 42.1 % (ref 35–47)
HGB BLD-MCNC: 13.6 G/DL (ref 11.7–15.7)
IMM GRANULOCYTES # BLD: 0 10E3/UL
IMM GRANULOCYTES NFR BLD: 0 %
INR PPP: 1.07 (ref 0.85–1.15)
INTERPRETATION ECG - MUSE: NORMAL
LIPASE SERPL-CCNC: 34 U/L (ref 13–60)
LYMPHOCYTES # BLD AUTO: 1.2 10E3/UL (ref 0.8–5.3)
LYMPHOCYTES NFR BLD AUTO: 27 %
MCH RBC QN AUTO: 29.4 PG (ref 26.5–33)
MCHC RBC AUTO-ENTMCNC: 32.3 G/DL (ref 31.5–36.5)
MCV RBC AUTO: 91 FL (ref 78–100)
MONOCYTES # BLD AUTO: 0.3 10E3/UL (ref 0–1.3)
MONOCYTES NFR BLD AUTO: 7 %
NEUTROPHILS # BLD AUTO: 2.7 10E3/UL (ref 1.6–8.3)
NEUTROPHILS NFR BLD AUTO: 63 %
NRBC # BLD AUTO: 0 10E3/UL
NRBC BLD AUTO-RTO: 0 /100
NT-PROBNP SERPL-MCNC: <36 PG/ML (ref 0–900)
P AXIS - MUSE: 78 DEGREES
PLATELET # BLD AUTO: 249 10E3/UL (ref 150–450)
POTASSIUM SERPL-SCNC: 3.8 MMOL/L (ref 3.4–5.3)
PR INTERVAL - MUSE: 198 MS
PROT SERPL-MCNC: 6.7 G/DL (ref 6.4–8.3)
QRS DURATION - MUSE: 104 MS
QT - MUSE: 436 MS
QTC - MUSE: 449 MS
R AXIS - MUSE: 41 DEGREES
RBC # BLD AUTO: 4.63 10E6/UL (ref 3.8–5.2)
SODIUM SERPL-SCNC: 141 MMOL/L (ref 135–145)
SYSTOLIC BLOOD PRESSURE - MUSE: NORMAL MMHG
T AXIS - MUSE: 60 DEGREES
TROPONIN T SERPL HS-MCNC: <6 NG/L
VENTRICULAR RATE- MUSE: 64 BPM
WBC # BLD AUTO: 4.3 10E3/UL (ref 4–11)

## 2025-03-01 PROCEDURE — 85610 PROTHROMBIN TIME: CPT | Performed by: FAMILY MEDICINE

## 2025-03-01 PROCEDURE — 71275 CT ANGIOGRAPHY CHEST: CPT

## 2025-03-01 PROCEDURE — 93005 ELECTROCARDIOGRAM TRACING: CPT | Mod: 59

## 2025-03-01 PROCEDURE — 250N000011 HC RX IP 250 OP 636: Performed by: FAMILY MEDICINE

## 2025-03-01 PROCEDURE — 93308 TTE F-UP OR LMTD: CPT

## 2025-03-01 PROCEDURE — 99285 EMERGENCY DEPT VISIT HI MDM: CPT | Mod: 25

## 2025-03-01 PROCEDURE — 71046 X-RAY EXAM CHEST 2 VIEWS: CPT

## 2025-03-01 PROCEDURE — 85379 FIBRIN DEGRADATION QUANT: CPT | Performed by: FAMILY MEDICINE

## 2025-03-01 PROCEDURE — 85004 AUTOMATED DIFF WBC COUNT: CPT | Performed by: FAMILY MEDICINE

## 2025-03-01 PROCEDURE — 250N000009 HC RX 250: Performed by: FAMILY MEDICINE

## 2025-03-01 PROCEDURE — 93971 EXTREMITY STUDY: CPT | Mod: 26 | Performed by: RADIOLOGY

## 2025-03-01 PROCEDURE — 258N000003 HC RX IP 258 OP 636: Performed by: FAMILY MEDICINE

## 2025-03-01 PROCEDURE — 96360 HYDRATION IV INFUSION INIT: CPT

## 2025-03-01 PROCEDURE — 82247 BILIRUBIN TOTAL: CPT | Performed by: FAMILY MEDICINE

## 2025-03-01 PROCEDURE — 71275 CT ANGIOGRAPHY CHEST: CPT | Mod: 26 | Performed by: RADIOLOGY

## 2025-03-01 PROCEDURE — 85730 THROMBOPLASTIN TIME PARTIAL: CPT | Performed by: FAMILY MEDICINE

## 2025-03-01 PROCEDURE — 82947 ASSAY GLUCOSE BLOOD QUANT: CPT | Performed by: FAMILY MEDICINE

## 2025-03-01 PROCEDURE — 83880 ASSAY OF NATRIURETIC PEPTIDE: CPT | Performed by: FAMILY MEDICINE

## 2025-03-01 PROCEDURE — 84484 ASSAY OF TROPONIN QUANT: CPT | Performed by: FAMILY MEDICINE

## 2025-03-01 PROCEDURE — 93971 EXTREMITY STUDY: CPT | Mod: RT

## 2025-03-01 PROCEDURE — 83690 ASSAY OF LIPASE: CPT | Performed by: FAMILY MEDICINE

## 2025-03-01 PROCEDURE — 36415 COLL VENOUS BLD VENIPUNCTURE: CPT | Performed by: FAMILY MEDICINE

## 2025-03-01 PROCEDURE — 71046 X-RAY EXAM CHEST 2 VIEWS: CPT | Mod: 26 | Performed by: RADIOLOGY

## 2025-03-01 RX ORDER — IOPAMIDOL 755 MG/ML
73 INJECTION, SOLUTION INTRAVASCULAR ONCE
Status: COMPLETED | OUTPATIENT
Start: 2025-03-01 | End: 2025-03-01

## 2025-03-01 RX ORDER — LIDOCAINE 40 MG/G
CREAM TOPICAL
Status: DISCONTINUED | OUTPATIENT
Start: 2025-03-01 | End: 2025-03-01 | Stop reason: HOSPADM

## 2025-03-01 RX ADMIN — SODIUM CHLORIDE 101 ML: 9 INJECTION, SOLUTION INTRAVENOUS at 14:07

## 2025-03-01 RX ADMIN — IOPAMIDOL 73 ML: 755 INJECTION, SOLUTION INTRAVENOUS at 14:07

## 2025-03-01 RX ADMIN — SODIUM CHLORIDE 1000 ML: 0.9 INJECTION, SOLUTION INTRAVENOUS at 12:20

## 2025-03-01 ASSESSMENT — ACTIVITIES OF DAILY LIVING (ADL)
ADLS_ACUITY_SCORE: 41

## 2025-03-01 ASSESSMENT — COLUMBIA-SUICIDE SEVERITY RATING SCALE - C-SSRS
2. HAVE YOU ACTUALLY HAD ANY THOUGHTS OF KILLING YOURSELF IN THE PAST MONTH?: NO
6. HAVE YOU EVER DONE ANYTHING, STARTED TO DO ANYTHING, OR PREPARED TO DO ANYTHING TO END YOUR LIFE?: NO
1. IN THE PAST MONTH, HAVE YOU WISHED YOU WERE DEAD OR WISHED YOU COULD GO TO SLEEP AND NOT WAKE UP?: NO

## 2025-03-01 NOTE — ED TRIAGE NOTES
"Chest pain - Pt reports chest pain x 1 month. Pt continues \"It always rogerio wakes me up in the night\". Pt denies chest pain at time of triage but then adds \"I feels rogerio tight\".      Triage Assessment (Adult)       Row Name 03/01/25 0951          Triage Assessment    Airway WDL WDL        Respiratory WDL    Respiratory WDL WDL        Skin Circulation/Temperature WDL    Skin Circulation/Temperature WDL WDL        Cardiac WDL    Cardiac WDL WDL        Peripheral/Neurovascular WDL    Peripheral Neurovascular WDL WDL        Cognitive/Neuro/Behavioral WDL    Cognitive/Neuro/Behavioral WDL WDL                     "

## 2025-03-01 NOTE — DISCHARGE INSTRUCTIONS
Home.  Your EKG looked normal  Your labs including heart enzyme test etc. were normal.  Your D-dimer test which is slightly elevated which we then did a CT of her chest which the preliminary CT does not show any sign of blood clots etc.  You do have some gallstones seen on the CT scan but do not feel that this is causing your pain currently at this point as these typically come with eating and noticing pain after eating.  Your symptoms may be reflux related you could try an antacid at night or Pepcid or Prilosec over-the-counter to see if it makes any difference.  Follow-up with your physician and talk to them about your symptoms they may consider doing an outpatient stress test but at this point your symptoms are very atypical and reassuring that they are not exertional.  Monitor your symptoms and if any change now related to activity shortness of breath diaphoresis IV pressure or anything else like that return to the ER follow-up with MD.  Your ultrasound of the right leg was negative for any clots also.    Results for orders placed or performed during the hospital encounter of 03/01/25   XR Chest 2 Views     Status: None    Narrative    EXAM: XR CHEST 2 VIEWS  3/1/2025 10:45 AM      HISTORY: cp    COMPARISON: 1/6/2017.    FINDINGS: Two views of the chest. Trachea is midline.  Cardiomediastinal silhouette is within normal limits. Both lungs are  clear. No pleural effusion or pneumothorax. Imaged upper abdomen is  within normal limits. No acute osseous abnormalities.      Impression    IMPRESSION: No acute airspace opacities.    JIGAR RUEDA MD         SYSTEM ID:  [id number]   US Lower Extremity Venous Duplex Right     Status: None    Narrative    EXAMINATION: DOPPLER VENOUS ULTRASOUND OF THE RIGHT LOWER EXTREMITY,  3/1/2025 11:04 AM     COMPARISON: None.    HISTORY: hx cp also right lower ext pain eval for dvt    TECHNIQUE:  Gray-scale evaluation with compression, spectral flow, and  color Doppler assessment of  the deep venous system of the right leg  from groin to knee, and then at the ankle.    FINDINGS:  In the right lower extremity, the common femoral, femoral, popliteal  and posterior tibial veins demonstrate normal compressibility and  blood flow.      Impression    IMPRESSION:  1.  No evidence of right lower extremity deep venous thrombosis.    I have personally reviewed the examination and initial interpretation  and I agree with the findings.    JIGAR RUEDA MD         SYSTEM ID:  [id number]   CT Chest Pulmonary Embolism w Contrast     Status: None (Preliminary result)    Impression    RESIDENT PRELIMINARY INTERPRETATION  IMPRESSION:   1.  No pulmonary embolism. No acute consolidation.  2.  Partially visualized cholelithiasis and small hypoattenuating  lesions throughout the liver favored to represent hemangiomas.   Partial thromboplastin time     Status: Normal   Result Value Ref Range    aPTT 29 22 - 38 Seconds   INR     Status: Normal   Result Value Ref Range    INR 1.07 0.85 - 1.15   D dimer quantitative     Status: Abnormal   Result Value Ref Range    D-Dimer Quantitative 0.83 (H) 0.00 - 0.50 ug/mL FEU    Narrative    This D-dimer assay is intended for use in conjunction with a clinical pretest probability assessment model to exclude pulmonary embolism (PE) and deep venous thrombosis (DVT) in outpatients suspected of PE or DVT. The cut-off value is 0.50 ug/mL FEU.    For patients 50 years of age or older, the application of age-adjusted cut-off values for D-Dimer may increase the specificity without significant effect on sensitivity. The literature suggested calculation age adjusted cut-off in ug/L = age in years x 10 ug/L. The results in this laboratory are reported as ug/mL rather than ug/L. The calculation for age adjusted cut off in ug/mL= age in years x 0.01 ug/mL. For example, the cut off for a 76 year old male is 77 x 0.01 ug/mL = 0.76 ug/mL (760 ug/L).    GIOVANA Pedro et al. Age adjusted D-dimer cut-off  levels to rule out pulmonary embolism: The ADJUST-PE Study. ROSEANNA 2014;311:9356-5807.; HJ Gustabo et al. Diagnostic accuracy of conventional or age adjusted D-dimer cutoff values in older patients with suspected venous thromboembolism. Systemic review and meta-analysis. BMJ 2013:346:f2492.   Comprehensive metabolic panel     Status: Abnormal   Result Value Ref Range    Sodium 141 135 - 145 mmol/L    Potassium 3.8 3.4 - 5.3 mmol/L    Carbon Dioxide (CO2) 26 22 - 29 mmol/L    Anion Gap 9 7 - 15 mmol/L    Urea Nitrogen 10.6 6.0 - 20.0 mg/dL    Creatinine 0.60 0.51 - 0.95 mg/dL    GFR Estimate >90 >60 mL/min/1.73m2    Calcium 9.0 8.8 - 10.4 mg/dL    Chloride 106 98 - 107 mmol/L    Glucose 132 (H) 70 - 99 mg/dL    Alkaline Phosphatase 120 40 - 150 U/L    AST 24 0 - 45 U/L    ALT 21 0 - 50 U/L    Protein Total 6.7 6.4 - 8.3 g/dL    Albumin 3.9 3.5 - 5.2 g/dL    Bilirubin Total 0.3 <=1.2 mg/dL   Lipase     Status: Normal   Result Value Ref Range    Lipase 34 13 - 60 U/L   Troponin T, High Sensitivity     Status: Normal   Result Value Ref Range    Troponin T, High Sensitivity <6 <=14 ng/L   Nt probnp inpatient (BNP)     Status: Normal   Result Value Ref Range    N terminal Pro BNP Inpatient <36 0 - 900 pg/mL   CBC with platelets and differential     Status: None   Result Value Ref Range    WBC Count 4.3 4.0 - 11.0 10e3/uL    RBC Count 4.63 3.80 - 5.20 10e6/uL    Hemoglobin 13.6 11.7 - 15.7 g/dL    Hematocrit 42.1 35.0 - 47.0 %    MCV 91 78 - 100 fL    MCH 29.4 26.5 - 33.0 pg    MCHC 32.3 31.5 - 36.5 g/dL    RDW 12.8 10.0 - 15.0 %    Platelet Count 249 150 - 450 10e3/uL    % Neutrophils 63 %    % Lymphocytes 27 %    % Monocytes 7 %    % Eosinophils 2 %    % Basophils 1 %    % Immature Granulocytes 0 %    NRBCs per 100 WBC 0 <1 /100    Absolute Neutrophils 2.7 1.6 - 8.3 10e3/uL    Absolute Lymphocytes 1.2 0.8 - 5.3 10e3/uL    Absolute Monocytes 0.3 0.0 - 1.3 10e3/uL    Absolute Eosinophils 0.1 0.0 - 0.7 10e3/uL    Absolute  Basophils 0.0 0.0 - 0.2 10e3/uL    Absolute Immature Granulocytes 0.0 <=0.4 10e3/uL    Absolute NRBCs 0.0 10e3/uL   EKG 12-lead, tracing only     Status: None (Preliminary result)   Result Value Ref Range    Systolic Blood Pressure  mmHg    Diastolic Blood Pressure  mmHg    Ventricular Rate 64 BPM    Atrial Rate 64 BPM    NV Interval 198 ms    QRS Duration 104 ms     ms    QTc 449 ms    P Axis 78 degrees    R AXIS 41 degrees    T Axis 60 degrees    Interpretation ECG Sinus rhythm  Normal ECG      CBC with platelets differential     Status: None    Narrative    The following orders were created for panel order CBC with platelets differential.  Procedure                               Abnormality         Status                     ---------                               -----------         ------                     CBC with platelets and d...[[id number]]                      Final result                 Please view results for these tests on the individual orders.

## 2025-03-01 NOTE — ED PROVIDER NOTES
"    [facility] ([facility])    3/01/25       ED PROVIDER NOTE   History     Chief Complaint   Patient presents with    Chest Pain     Pt reports chest pain x 1 month. Pt continues \"It always rogerio wakes me up in the night\". Pt denies chest pain at time of triage but then adds \"I feels rogerio tight\".      The history is provided by the patient and medical records.     Nazia Jasso is a 54 year old female with history of thyroid disease on levothyroxine, obstructive sleep apnea, obesity (on Zepbound since 2/11/2025) who presents to the ED for chest pain for the past month.  She states that this pain tends to wake her up at night and feels tight.  Patient describes it more in the morning when she wakes up but not during the day, not exertional related.  She describes it as mid left anterior chest pain, somewhat sharp, is not pleuritic, it last for several minutes but then goes away.  There is really no associated nausea, vomiting, reflux symptoms, no shortness of breath with this.  Patient has noted a little bit of right calf tenderness over the last month but no history of any DVTs or recent travel at all.  She is only on estrogen cream vaginally and not oral estrogen.  As noted, no history of blood clots.  Patient melanoma history is that of local excision with just surveillance follow-up.  Patient's family history notable for grandparents with heart disease but no heart disease in parents or other siblings.  She herself does not have heart disease otherwise.  Denies reflux symptoms, now presents without symptoms at this point.  Maybe a questionable cough noted, but no other COVID-like symptoms.    Past Medical History  Past Medical History:   Diagnosis Date    ASCUS of cervix with negative high risk HPV 06/17/2016 6/17/16 ASCUS/neg HR HPV.     Depressive disorder 1997    Endometriosis, site unspecified     Endometriosis    Female infertility of unspecified origin     " "Female infertility    Melanoma of abdomen (H) 08/29/2023    Thyroid disease      History reviewed. No pertinent surgical history.  Cholecalciferol (VITAMIN D-3) 5000 units TABS  citalopram (CELEXA) 40 MG tablet  cyclobenzaprine (FLEXERIL) 5 MG tablet  desvenlafaxine (PRISTIQ) 50 MG 24 hr tablet  estradiol (ESTRACE) 0.1 MG/GM vaginal cream  fluticasone (FLONASE) 50 MCG/ACT nasal spray  levothyroxine (SYNTHROID/LEVOTHROID) 50 MCG tablet  liothyronine (CYTOMEL) 5 MCG tablet  Multiple Vitamin (MULTIVITAMINS PO)  tirzepatide-Weight Management (ZEPBOUND) 5 MG/0.5ML prefilled pen  [START ON 4/8/2025] tirzepatide-Weight Management (ZEPBOUND) 7.5 MG/0.5ML prefilled pen      Allergies   Allergen Reactions    Septra Ds [Sulfamethoxazole W-Trimethoprim] Hives     Happened at end of course from 12/01/2011-12/    Amoxicillin     Erythromycin      hives    Penicillins      hives     Family History  Family History   Problem Relation Age of Onset    C.A.D. Paternal Grandfather     Cerebrovascular Disease Paternal Grandfather     C.A.D. Maternal Grandfather     Hypertension Mother     Hypertension Maternal Grandmother     Prostate Cancer Father     Thyroid Disease Sister     Hypothyroidism Sister     No Known Problems Brother     Diabetes No family hx of     Breast Cancer No family hx of     Cancer - colorectal No family hx of      Social History   Social History     Tobacco Use    Smoking status: Never     Passive exposure: Never    Smokeless tobacco: Never   Vaping Use    Vaping status: Never Used   Substance Use Topics    Alcohol use: Yes     Comment: 1-2 drink per month    Drug use: No      A medically appropriate review of systems was performed with pertinent positives and negatives noted in the HPI, and all other systems negative.    Physical Exam   BP: (!) 143/65  Pulse: 69  Temp: 97.8  F (36.6  C)  Resp: 17  Height: 172.7 cm (5' 8\")  Weight: 113.4 kg (250 lb)  SpO2: 95 %    Physical Exam  Vitals and nursing note " reviewed.   Constitutional:       General: She is not in acute distress.     Appearance: Normal appearance. She is well-developed.      Comments: Patient symptom-free here with  does give collateral information   HENT:      Head: Normocephalic and atraumatic.      Mouth/Throat:      Mouth: Mucous membranes are moist.      Pharynx: Oropharynx is clear.   Eyes:      General: No scleral icterus.     Extraocular Movements: Extraocular movements intact.      Conjunctiva/sclera: Conjunctivae normal.      Pupils: Pupils are equal, round, and reactive to light.   Cardiovascular:      Rate and Rhythm: Normal rate and regular rhythm.   Pulmonary:      Effort: Pulmonary effort is normal. No respiratory distress.      Breath sounds: No stridor.   Chest:      Chest wall: No tenderness.   Abdominal:      General: Abdomen is flat. There is no distension.      Tenderness: There is no abdominal tenderness. There is no guarding.   Musculoskeletal:         General: Tenderness present. No swelling.      Cervical back: Normal range of motion and neck supple. No rigidity.      Comments: Patient right calf mildly tender but not swollen negative Homans' sign   Skin:     General: Skin is warm and dry.      Capillary Refill: Capillary refill takes less than 2 seconds.      Findings: No rash.   Neurological:      General: No focal deficit present.      Mental Status: She is alert and oriented to person, place, and time. Mental status is at baseline.   Psychiatric:      Comments: Appropriate       ED Course, Procedures, & Data        Records are in epic at this point.  Patient otherwise stable.  Collateral history per  also.  Meds reviewed allergies reviewed.  Refer to HPI also.    In the ER we did do an EKG appeared normal no tachycardia noted chest x-ray appears normal interpreted by myself 2 view also without signs of effusion pneumothorax heart failure infiltrates etc.  Patient's troponin was negative.  BNP negative  chemistries normal etc. D-dimer elevated 0.83.    Patient formed the results has been symptom-free a liter of saline given.  Patient has CT chest PE protocol which was read as negative for pulmonary embolism.  No other infiltrate seen.  There are some gallstones seen.    Discussed with patient here in the ER.  Patient is symptom-free her symptoms very atypical it is more of a sharp chest pain that is pleuritic last 5 to 10 minutes wakes in the morning not every morning but not exertional related etc.  Feel it may be reflux chest wall could be medication related but patient this point is comfortable going home will try GI medications to see if this helps potentially monitoring her symptoms return if worsening symptoms or change following up with MD may consider an outpatient stress test just to make sure she is concerned about this also patient agrees with plan discharge with .        Procedures  Results for orders placed during the hospital encounter of 03/01/25    POC US ECHO LIMITED    Impression  Limited Bedside Cardiac Ultrasound, performed and interpreted by me.  Indication: Chest Pain.  Parasternal long axis, parasternal short axis, and apical 4 chamber views were acquired.  Image quality was satisfactory.    Findings:  Global left ventricular function appears intact.  Chambers do not appear dilated.  There is no evidence of free fluid within the pericardium.    IMPRESSION: Grossly normal left ventricular function and chamber size.  No pericardial effusion..            EKG Interpretation:      Interpreted by Jim Zavala MD  Time reviewed: 1002  Symptoms at time of EKG: chest pain hx pain free currently   Rhythm: normal sinus   Rate: normal  Axis: normal  Ectopy: none  Conduction: normal  ST Segments/ T Waves: No ST-T wave changes  Q Waves: none  Comparison to prior: No old EKG available    Clinical Impression: normal EKG          Results for orders placed or performed during the hospital  encounter of 03/01/25   XR Chest 2 Views     Status: None    Narrative    EXAM: XR CHEST 2 VIEWS  3/1/2025 10:45 AM      HISTORY: cp    COMPARISON: 1/6/2017.    FINDINGS: Two views of the chest. Trachea is midline.  Cardiomediastinal silhouette is within normal limits. Both lungs are  clear. No pleural effusion or pneumothorax. Imaged upper abdomen is  within normal limits. No acute osseous abnormalities.      Impression    IMPRESSION: No acute airspace opacities.    JIGAR RUEDA MD         SYSTEM ID:  [id number]   POC US ECHO LIMITED     Status: None    Impression    Limited Bedside Cardiac Ultrasound, performed and interpreted by me.   Indication: Chest Pain.  Parasternal long axis, parasternal short axis, and apical 4 chamber views were acquired.   Image quality was satisfactory.    Findings:    Global left ventricular function appears intact.  Chambers do not appear dilated.  There is no evidence of free fluid within the pericardium.    IMPRESSION: Grossly normal left ventricular function and chamber size.  No pericardial effusion..       US Lower Extremity Venous Duplex Right     Status: None    Narrative    EXAMINATION: DOPPLER VENOUS ULTRASOUND OF THE RIGHT LOWER EXTREMITY,  3/1/2025 11:04 AM     COMPARISON: None.    HISTORY: hx cp also right lower ext pain eval for dvt    TECHNIQUE:  Gray-scale evaluation with compression, spectral flow, and  color Doppler assessment of the deep venous system of the right leg  from groin to knee, and then at the ankle.    FINDINGS:  In the right lower extremity, the common femoral, femoral, popliteal  and posterior tibial veins demonstrate normal compressibility and  blood flow.      Impression    IMPRESSION:  1.  No evidence of right lower extremity deep venous thrombosis.    I have personally reviewed the examination and initial interpretation  and I agree with the findings.    JIGAR RUEDA MD         SYSTEM ID:  [id number]   CT Chest Pulmonary Embolism w Contrast      Status: None    Narrative    EXAM: CT CHEST PULMONARY EMBOLISM W CONTRAST, 3/1/2025 2:17 PM    HISTORY: 54 years Female cp with elevated d dimer    COMPARISON: Chest x-ray 3/1/2025.    TECHNIQUE: CTA imaging obtained through the chest with contrast was  performed. Coronal, sagittal and axial MIP reformatted images  obtained. Images reviewed in soft tissue, lung, and bone windows.    CONTRAST: 73ml isovue 370    FINDINGS:   image(s) are noncontributory. There is good contrast  opacification of the pulmonary vessels. No pulmonary embolus. No right  heart strain     LINES/TUBES: None.    LUNG: No focal consolidation. Sub-6 mm nodules, for example in the  right upper lobe (6/91). Rounded atelectasis in the anterior left  lower lobe.    AIRWAYS: Patent tracheobronchial tree without intraluminal mass    PLEURA: No pneumothorax or pleural effusion No pleural mass or  calcification    VASCULAR: Normal size of the great vessels  Normal configuration of  the great vessels    CARDIAC: No cardiomegaly No pericardial effusion    MEDIASTINUM: No suspicious lymphadenopathy.    CHEST WALL: Unremarkable.    LOWER NECK: Thyroid unremarkable.    UPPER ABDOMEN: Limited evaluation. Esophagus appears unremarkable.  Cholelithiasis. Right posterior lobe of the liver there is a poorly  demarcated 1.1 x 1.0 cm hypoattenuating lesion (4/255). Additional  smaller lesion in the left lobe (4/266) in the right lateral lobe  (4/231)    MUSCULOSKELETAL: Thoracic dextroscoliosis. No acute osseous  abnormality. No suspicious osseous lesions. Unremarkable soft tissues.      Impression    IMPRESSION:   1.  No pulmonary embolism. No acute consolidation.  2.  Partially visualized cholelithiasis and small hypoattenuating  lesions throughout the liver favored to represent hemangiomas. Could  correlate with any prior outside imaging of the liver if available.    I have personally reviewed the examination and initial interpretation  and I agree with the  findings.    BRIONNA HAMILTON MD         SYSTEM ID:  [id number]   Partial thromboplastin time     Status: Normal   Result Value Ref Range    aPTT 29 22 - 38 Seconds   INR     Status: Normal   Result Value Ref Range    INR 1.07 0.85 - 1.15   D dimer quantitative     Status: Abnormal   Result Value Ref Range    D-Dimer Quantitative 0.83 (H) 0.00 - 0.50 ug/mL FEU    Narrative    This D-dimer assay is intended for use in conjunction with a clinical pretest probability assessment model to exclude pulmonary embolism (PE) and deep venous thrombosis (DVT) in outpatients suspected of PE or DVT. The cut-off value is 0.50 ug/mL FEU.    For patients 50 years of age or older, the application of age-adjusted cut-off values for D-Dimer may increase the specificity without significant effect on sensitivity. The literature suggested calculation age adjusted cut-off in ug/L = age in years x 10 ug/L. The results in this laboratory are reported as ug/mL rather than ug/L. The calculation for age adjusted cut off in ug/mL= age in years x 0.01 ug/mL. For example, the cut off for a 76 year old male is 77 x 0.01 ug/mL = 0.76 ug/mL (760 ug/L).    M Willis et al. Age adjusted D-dimer cut-off levels to rule out pulmonary embolism: The ADJUST-PE Study. ROSEANNA 2014;311:0415-5339.; HJ uGstabo et al. Diagnostic accuracy of conventional or age adjusted D-dimer cutoff values in older patients with suspected venous thromboembolism. Systemic review and meta-analysis. BMJ 2013:346:f2492.   Comprehensive metabolic panel     Status: Abnormal   Result Value Ref Range    Sodium 141 135 - 145 mmol/L    Potassium 3.8 3.4 - 5.3 mmol/L    Carbon Dioxide (CO2) 26 22 - 29 mmol/L    Anion Gap 9 7 - 15 mmol/L    Urea Nitrogen 10.6 6.0 - 20.0 mg/dL    Creatinine 0.60 0.51 - 0.95 mg/dL    GFR Estimate >90 >60 mL/min/1.73m2    Calcium 9.0 8.8 - 10.4 mg/dL    Chloride 106 98 - 107 mmol/L    Glucose 132 (H) 70 - 99 mg/dL    Alkaline Phosphatase 120 40 - 150 U/L    AST  24 0 - 45 U/L    ALT 21 0 - 50 U/L    Protein Total 6.7 6.4 - 8.3 g/dL    Albumin 3.9 3.5 - 5.2 g/dL    Bilirubin Total 0.3 <=1.2 mg/dL   Lipase     Status: Normal   Result Value Ref Range    Lipase 34 13 - 60 U/L   Troponin T, High Sensitivity     Status: Normal   Result Value Ref Range    Troponin T, High Sensitivity <6 <=14 ng/L   Nt probnp inpatient (BNP)     Status: Normal   Result Value Ref Range    N terminal Pro BNP Inpatient <36 0 - 900 pg/mL   CBC with platelets and differential     Status: None   Result Value Ref Range    WBC Count 4.3 4.0 - 11.0 10e3/uL    RBC Count 4.63 3.80 - 5.20 10e6/uL    Hemoglobin 13.6 11.7 - 15.7 g/dL    Hematocrit 42.1 35.0 - 47.0 %    MCV 91 78 - 100 fL    MCH 29.4 26.5 - 33.0 pg    MCHC 32.3 31.5 - 36.5 g/dL    RDW 12.8 10.0 - 15.0 %    Platelet Count 249 150 - 450 10e3/uL    % Neutrophils 63 %    % Lymphocytes 27 %    % Monocytes 7 %    % Eosinophils 2 %    % Basophils 1 %    % Immature Granulocytes 0 %    NRBCs per 100 WBC 0 <1 /100    Absolute Neutrophils 2.7 1.6 - 8.3 10e3/uL    Absolute Lymphocytes 1.2 0.8 - 5.3 10e3/uL    Absolute Monocytes 0.3 0.0 - 1.3 10e3/uL    Absolute Eosinophils 0.1 0.0 - 0.7 10e3/uL    Absolute Basophils 0.0 0.0 - 0.2 10e3/uL    Absolute Immature Granulocytes 0.0 <=0.4 10e3/uL    Absolute NRBCs 0.0 10e3/uL   EKG 12-lead, tracing only     Status: None   Result Value Ref Range    Systolic Blood Pressure  mmHg    Diastolic Blood Pressure  mmHg    Ventricular Rate 64 BPM    Atrial Rate 64 BPM    PA Interval 198 ms    QRS Duration 104 ms     ms    QTc 449 ms    P Axis 78 degrees    R AXIS 41 degrees    T Axis 60 degrees    Interpretation ECG       Sinus rhythm  Normal ECG    Unconfirmed report - interpretation of this ECG is computer generated - see medical record for final interpretation  Confirmed by - EMERGENCY ROOM, PHYSICIAN ([id number]),  Yasmani Sterling ([id number]) on 3/1/2025 8:24:24 PM     CBC with platelets differential     Status: None     Narrative    The following orders were created for panel order CBC with platelets differential.  Procedure                               Abnormality         Status                     ---------                               -----------         ------                     CBC with platelets and d...[[id number]]                      Final result                 Please view results for these tests on the individual orders.     Medications   sodium chloride 0.9% BOLUS 1,000 mL (0 mLs Intravenous Stopped 3/1/25 1342)   iopamidol (ISOVUE-370) solution 73 mL (73 mLs Intravenous $Given 3/1/25 1407)   sodium chloride 0.9 % (101 mLs Intravenous $Given 3/1/25 1407)     Labs Ordered and Resulted from Time of ED Arrival to Time of ED Departure   D DIMER QUANTITATIVE - Abnormal       Result Value    D-Dimer Quantitative 0.83 (*)    COMPREHENSIVE METABOLIC PANEL - Abnormal    Sodium 141      Potassium 3.8      Carbon Dioxide (CO2) 26      Anion Gap 9      Urea Nitrogen 10.6      Creatinine 0.60      GFR Estimate >90      Calcium 9.0      Chloride 106      Glucose 132 (*)     Alkaline Phosphatase 120      AST 24      ALT 21      Protein Total 6.7      Albumin 3.9      Bilirubin Total 0.3     PARTIAL THROMBOPLASTIN TIME - Normal    aPTT 29     INR - Normal    INR 1.07     LIPASE - Normal    Lipase 34     TROPONIN T, HIGH SENSITIVITY - Normal    Troponin T, High Sensitivity <6     NT PROBNP INPATIENT - Normal    N terminal Pro BNP Inpatient <36     CBC WITH PLATELETS AND DIFFERENTIAL    WBC Count 4.3      RBC Count 4.63      Hemoglobin 13.6      Hematocrit 42.1      MCV 91      MCH 29.4      MCHC 32.3      RDW 12.8      Platelet Count 249      % Neutrophils 63      % Lymphocytes 27      % Monocytes 7      % Eosinophils 2      % Basophils 1      % Immature Granulocytes 0      NRBCs per 100 WBC 0      Absolute Neutrophils 2.7      Absolute Lymphocytes 1.2      Absolute Monocytes 0.3      Absolute Eosinophils 0.1      Absolute Basophils  0.0      Absolute Immature Granulocytes 0.0      Absolute NRBCs 0.0       CT Chest Pulmonary Embolism w Contrast   Final Result   IMPRESSION:    1.  No pulmonary embolism. No acute consolidation.   2.  Partially visualized cholelithiasis and small hypoattenuating   lesions throughout the liver favored to represent hemangiomas. Could   correlate with any prior outside imaging of the liver if available.      I have personally reviewed the examination and initial interpretation   and I agree with the findings.      BRIONNA HAMILTON MD            SYSTEM ID:  [id number]      US Lower Extremity Venous Duplex Right   Final Result   IMPRESSION:   1.  No evidence of right lower extremity deep venous thrombosis.      I have personally reviewed the examination and initial interpretation   and I agree with the findings.      JIGAR RUEDA MD            SYSTEM ID:  [id number]      XR Chest 2 Views   Final Result   IMPRESSION: No acute airspace opacities.      JIGAR RUEDA MD            SYSTEM ID:  [id number]      POC US ECHO LIMITED   Final Result   Limited Bedside Cardiac Ultrasound, performed and interpreted by me.    Indication: Chest Pain.   Parasternal long axis, parasternal short axis, and apical 4 chamber views were acquired.    Image quality was satisfactory.      Findings:     Global left ventricular function appears intact.   Chambers do not appear dilated.   There is no evidence of free fluid within the pericardium.      IMPRESSION: Grossly normal left ventricular function and chamber size.  No pericardial effusion..                   Critical care was not performed.     Medical Decision Making  The patient's presentation was of moderate complexity (an acute illness with systemic symptoms).    The patient's evaluation involved:  an assessment requiring an independent historian (see separate area of note for details)  review of external note(s) from 3+ sources (see separate area of note for details)  review of 3+ test  result(s) ordered prior to this encounter (see separate area of note for details)  ordering and/or review of 3+ test(s) in this encounter (see separate area of note for details)    The patient's management necessitated moderate risk (prescription drug management including medications given in the ED).    Assessment & Plan   54-year-old female presents ER with 3 to 4 weeks of intermittent chest pain describes it just upon wakening mid chest area somewhat sharp and pleuritic it resolved after 10 minutes no other associated symptoms with it is not exertional related no cough cold symptoms here in the ER is evaluated symptom-free EKG was normal no tachycardia D-dimer elevated 0.83 other labs cardiac workup otherwise negative.  CT scan chest was negative for PE there are some gallstones but it does not seem to be diet related patient aware though that just does have gallstones.  At this point patient is comfortably discharged can try GI remedies as potential GERD may be the cause reassuring it is not exertional related no other symptoms with it.  Patient will also follow-up primary physician may consider an outpatient stress echo.  Patient return if any concerns agrees with plan, discharged with her .       I have reviewed the nursing notes. I have reviewed the findings, diagnosis, plan and need for follow up with the patient.    Discharge Medication List as of 3/1/2025  4:04 PM          Final diagnoses:   Atypical chest pain       Jim Zavala MD  [facility] EMERGENCY DEPARTMENT  3/1/2025    This note was created at least in part by the use of dragon voice dictation system. Inadvertent typographical errors may still exist.  Jim Zavala MD.  Patient evaluated in the emergency department during the COVID-19 pandemic period. Careful attention to patients safety was addressed throughout the evaluation. Evaluation and treatment management was initiated with disposition made efficiently and  appropriate as possible to minimize any risk of potential exposure to patient during this evaluation.          Jim Zavala MD  03/01/25 [id number]

## 2025-03-01 NOTE — TELEPHONE ENCOUNTER
"Nurse Triage SBAR    Is this a 2nd Level Triage? NO    Situation: Patient calling.     Background: Chest pain.     Assessment: Chest pain intermittently for two weeks.  No chest pain currently, but did have chest pain this morning which woke her from sleeping. Pain lasted twn minutes this morning. No arm or jaw pain, no fever. She currently feels chest \"tightness.\" Pt stated symptoms have been increasing over the past two weeks.     Protocol Recommended Disposition:   Go to ED Now    Recommendation: Go to ED now.  Pt agreeable.           Does the patient meet one of the following criteria for ADS visit consideration? 16+ years old, with an [facility] PCP     TIP  Providers, please consider if this condition is appropriate for management at one of our Acute and Diagnostic Services sites.     If patient is a good candidate, please use dotphrase <dot>triageresponse and select Refer to ADS to document.    Reason for Disposition   [1] Chest pain (or \"angina\") comes and goes AND [2] is happening more often (increasing in frequency) or getting worse (increasing in severity)  (Exception: Chest pains that last only a few seconds.)    Additional Information   Negative: SEVERE difficulty breathing (e.g., struggling for each breath, speaks in single words)   Negative: Difficult to awaken or acting confused (e.g., disoriented, slurred speech)   Negative: Shock suspected (e.g., cold/pale/clammy skin, too weak to stand, low BP, rapid pulse)   Negative: Passed out (i.e., lost consciousness, collapsed and was not responding)   Negative: Chest pain lasting longer than 5 minutes and ANY of the following:    history of heart disease  (i.e., heart attack, bypass surgery, angina, angioplasty, CHF; not just a heart murmur)    described as crushing, pressure-like, or heavy    age > 50    age > 30 AND at least one cardiac risk factor (i.e., hypertension, diabetes, obesity, smoker or strong family history of heart disease)    not relieved with " nitroglycerin   Negative: Heart beating < 50 beats per minute OR > 140 beats per minute   Negative: Visible sweat on face or sweat dripping down face   Negative: Sounds like a life-threatening emergency to the triager   Negative: Followed a chest injury   Negative: SEVERE chest pain    Protocols used: Chest Pain-A-AH

## 2025-03-03 ENCOUNTER — PATIENT OUTREACH (OUTPATIENT)
Dept: FAMILY MEDICINE | Facility: CLINIC | Age: 55
End: 2025-03-03
Payer: COMMERCIAL

## 2025-03-03 NOTE — TELEPHONE ENCOUNTER
Patient seen in the ER 3/1/25.    Diagnosis.    Atypical chest pain       Recommendations.    Schedule an appointment with Natalie Garcia NP (Nurse Practitioner - Family)     Christine M Klisch, RN

## 2025-03-03 NOTE — TELEPHONE ENCOUNTER
"ED/Discharge Protocol    \"Hi, my name is Edita Peñaloza RN, a registered nurse, and I am calling on behalf of Radha's office at [facility].  I am calling to follow up and see how things are going for you after your recent visit.\"    \"I see that you were in the (ER/UC/IP) on 3/1/25.    How are you doing now that you are home?\" \"I do not have the chest pain anymore and they kind of ruled it out to possible heart burn or esophageal stuff\".     Is patient experiencing symptoms that may require a hospital visit?  No    Discharge Instructions    \"Let's review your discharge instructions.  What is/are the follow-up recommendations?  Pt. Response: \" Yes, I should see Natalie so I am willing to schedule\".     \"Were you instructed to make a follow-up appointment?\"  Pt. Response: Yes.  Has appointment been made?   Yes      \"When you see the provider, I would recommend that you bring your discharge instructions with you.    Medications    \"How many new medications are you on since your hospitalization/ED visit?\"    0-1  \"How many of your current medicines changed (dose, timing, name, etc.) while you were in the hospital/ED visit?\"   0-1  \"Do you have questions about your medications?\"   No  \"Were you newly diagnosed with heart failure, COPD, diabetes or did you have a heart attack?\"   No  For patients on insulin: \"Did you start on insulin in the hospital or did you have your insulin dose changed?\"   No  Post Discharge Medication Reconciliation Status: discharge medications reconciled, continue medications without change.    Was MTM referral placed (*Make sure to put transitions as reason for referral)?   No    Call Summary    \"Do you have any questions or concerns about your condition or care plan at the moment?\"    No  Triage nurse advice given: Please call the clinic back if symptoms return, new symptoms develop before appt. Agreeable and understood.     Patient was in ER 1x in the past year (assess appropriateness of ER " "visits.)      \"If you have questions or things don't continue to improve, we encourage you contact us through the main clinic number,  [phone].  Even if the clinic is not open, triage nurses are available 24/7 to help you.     We would like you to know that our clinic has extended hours (provide information).  We also have urgent care (provide details on closest location and hours/contact info)\"      \"Thank you for your time and take care!\"      Thanks,  TARUN Kohler  [facility]       "

## 2025-03-04 ENCOUNTER — THERAPY VISIT (OUTPATIENT)
Dept: PHYSICAL THERAPY | Facility: CLINIC | Age: 55
End: 2025-03-04
Payer: COMMERCIAL

## 2025-03-04 DIAGNOSIS — G89.29 CHRONIC PAIN OF LEFT KNEE: Primary | ICD-10-CM

## 2025-03-04 DIAGNOSIS — M25.562 CHRONIC PAIN OF LEFT KNEE: Primary | ICD-10-CM

## 2025-03-04 PROCEDURE — 97110 THERAPEUTIC EXERCISES: CPT | Mod: GP

## 2025-03-04 NOTE — PROGRESS NOTES
03/04/25 0500   Appointment Info   Signing clinician's name / credentials Fareed Ortega, DPT, CSCS   Total/Authorized Visits E&T 8   Visits Used 4   Medical Diagnosis Chronic pain of left knee (M25.562, G89.29)   PT Tx Diagnosis L knee pain   Progress Note/Certification   Onset of illness/injury or Date of Surgery 10/03/24  (provider referral date)   Therapy Frequency every 1-2 weeks   Predicted Duration 6 weeks   Progress Note Due Date 04/15/25   Progress Note Completed Date 03/04/25   PT Goal 1   Goal Identifier Lifting/bending   Goal Description Patient will demonstrate lifting 25# item from floor to waist height with hip hinge strategy and no increase in pain x 5 reps to indicate ability to complete household tasks such as dressing, groceries, laundry, and yardwork.   Rationale to maximize safety and independence with performance of ADLs and functional tasks   Goal Progress lifting is tricky, but she's having trouble differentiating if it is a balance or strength issue   Target Date 04/15/25   Subjective Report   Subjective Report Returns today after 4 month hiatus from PT. Left knee is still the issue, continues to have pain mostly with stairs. Goal today is to recheck and get back into things. Has not kept up with exercises. Symptoms are about the same overall, was seeing improvement when she was participating but it has returned to baseline. Notes that patella mobilization was helpful at the time. Pain is not as significant as it was before, but down is still the most painful. 0/10 pain. Also endorses some significant right hip tightness/pain.   Objective Measures   Objective Measures Objective Measure 1;Objective Measure 2;Objective Measure 3;Objective Measure 4   Objective Measure 1   Objective Measure AROM L Knee   Details 0-120+   Objective Measure 2   Objective Measure hip and knee MMTs   Details 5/5 arjun - pt observes significant effort with L hip abduction and ER MMTs   Objective Measure 3   Objective  "Measure Quad Set   Details Good activation   Objective Measure 4   Objective Measure patellar mobility   Details WNL in all directions except for mild restriction in lateral glide, guarding appears to be present   Therapeutic Procedure/Exercise   Therapeutic Procedures: strength, endurance, ROM, flexibility minutes (19471) 40   Ther Proc 1 obj exam   Ther Proc 1 - Details see above   Ther Proc 2 Step Ups   Ther Proc 2 - Details x10 on 6\" stepstool arjun - no pain reported   Ther Proc 3 Education   Ther Proc 3 - Details education on utilizing brace if needed as well as discussing goal of PT to build up \"internal brace\". Encouraged pt to not wear brace unless absolutely necessary. Also educated on tendinopathy pathophysiology and how PT can impact/treat that condition/symptoms. Pt demos understanding   PTRx Ther Proc 1 Standing Quadriceps Stretch using Chair   PTRx Ther Proc 1 - Details x20\" hold for review - pt finds easier to perform compared to amber position stretch   PTRx Ther Proc 2 Pretzel Stretch   PTRx Ther Proc 2 - Details VR - performs often and finds very helpful for hip tightness   PTRx Ther Proc 3 Seated Piriformis Stretch   PTRx Ther Proc 3 - Details VR - performs often and finds helpful for hip tightness   PTRx Ther Proc 4 Patella Mobilization Medial   PTRx Ther Proc 4 - Details VR - pt reports this was very helpful   Skilled Intervention Exercise review/modification based on patient response   Patient Response/Progress Tolerates well   PTRx Ther Proc 5 Bridging With Theraband   PTRx Ther Proc 5 - Details 2x10 bluTB with 5\" hold - cueing for GS   PTRx Ther Proc 6 Short Arc Knee Extension (Long Sitting)   PTRx Ther Proc 6 - Details x10 - easy to do but feels good activation of quad    Education   Learner/Method Patient;No Barriers to Learning   Plan   Home program PTRx - online   Plan for next session Assess response to HEP, progress quad/prox hip strength/loading tolerance as able, taping?   Total " Session Time   Timed Code Treatment Minutes 40   Total Treatment Time (sum of timed and untimed services) 40         PLAN  Continue therapy per current plan of care. Pt returns today after 4 month hiatus endorsing minimal symptoms. Goal today was to recheck and assess her current position and return to consistent performance of HEP. Demos minimal impairments but can benefit from PT services to address these impairments and achieve goal.     Beginning/End Dates of Progress Note Reporting Period:  03/04/25 to 03/04/2025    Referring Provider:  Natalie Garcia

## 2025-03-11 ENCOUNTER — OFFICE VISIT (OUTPATIENT)
Dept: FAMILY MEDICINE | Facility: CLINIC | Age: 55
End: 2025-03-11
Payer: COMMERCIAL

## 2025-03-11 VITALS
BODY MASS INDEX: 39.37 KG/M2 | WEIGHT: 259.8 LBS | RESPIRATION RATE: 16 BRPM | HEART RATE: 67 BPM | HEIGHT: 68 IN | DIASTOLIC BLOOD PRESSURE: 82 MMHG | SYSTOLIC BLOOD PRESSURE: 126 MMHG | TEMPERATURE: 98.8 F | OXYGEN SATURATION: 95 %

## 2025-03-11 DIAGNOSIS — M79.661 PAIN OF RIGHT LOWER LEG: ICD-10-CM

## 2025-03-11 DIAGNOSIS — R94.6 ABNORMAL FINDING ON THYROID FUNCTION TEST: ICD-10-CM

## 2025-03-11 DIAGNOSIS — R07.9 ACUTE CHEST PAIN: Primary | ICD-10-CM

## 2025-03-11 PROCEDURE — 1125F AMNT PAIN NOTED PAIN PRSNT: CPT | Performed by: NURSE PRACTITIONER

## 2025-03-11 PROCEDURE — 99213 OFFICE O/P EST LOW 20 MIN: CPT | Performed by: NURSE PRACTITIONER

## 2025-03-11 PROCEDURE — 84443 ASSAY THYROID STIM HORMONE: CPT | Performed by: NURSE PRACTITIONER

## 2025-03-11 PROCEDURE — 80048 BASIC METABOLIC PNL TOTAL CA: CPT | Performed by: NURSE PRACTITIONER

## 2025-03-11 PROCEDURE — 83735 ASSAY OF MAGNESIUM: CPT | Performed by: NURSE PRACTITIONER

## 2025-03-11 PROCEDURE — 3074F SYST BP LT 130 MM HG: CPT | Performed by: NURSE PRACTITIONER

## 2025-03-11 PROCEDURE — 3079F DIAST BP 80-89 MM HG: CPT | Performed by: NURSE PRACTITIONER

## 2025-03-11 PROCEDURE — 36415 COLL VENOUS BLD VENIPUNCTURE: CPT | Performed by: NURSE PRACTITIONER

## 2025-03-11 ASSESSMENT — PAIN SCALES - GENERAL: PAINLEVEL_OUTOF10: MILD PAIN (3)

## 2025-03-11 NOTE — PROGRESS NOTES
"  Assessment & Plan     Pain of right lower leg  - Recent US of leg negative for DVT. Recommend checking electrolytes and use ibuprofen as needed for inflammation with flares. If pain becomes more consistent follow-up with PCP for further work-up  - Basic metabolic panel  (Ca, Cl, CO2, Creat, Gluc, K, Na, BUN)  - Magnesium    Acute chest pain  - Echocardiogram Exercise Stress; Future    Abnormal finding on thyroid function test  - TSH with free T4 reflex        MED REC REQUIRED  Post Medication Reconciliation Status:           Celine Mandujano is a 54 year old, presenting for the following health issues:  Follow Up (ED 3/1/2025. [facility] /Right lower leg pain and chest pain )      3/11/2025     8:57 AM   Additional Questions   Roomed by Brook ANDERSON     AMANDA Mandujano is a 55 yo female in clinic for an ER follow-up related to chest pain. She denies chest pain, N/V and dyspnea since recent ER visit. She does complain of intermittent RLE pain, she states the pain feels deep in the bone of her leg and not superficial. Denies any recent injury, swelling or bruising, and no pain above or below the limb.     ED/UC Followup:    Facility:  [facility]   Date of visit: 3/01/2025  Reason for visit: right lower ext pain eval for dvt,   Atypical chest pain     Current Status: better, still has lower leg pan     ED note copied here-    \"54-year-old female presents ER with 3 to 4 weeks of intermittent chest pain describes it just upon wakening mid chest area somewhat sharp and pleuritic it resolved after 10 minutes no other associated symptoms with it is not exertional related no cough cold symptoms here in the ER is evaluated symptom-free EKG was normal no tachycardia D-dimer elevated 0.83 other labs cardiac workup otherwise negative.  CT scan chest was negative for PE there are some gallstones but it does not seem to be diet related patient aware though that just does have gallstones.  At this point patient is comfortably discharged " "can try GI remedies as potential GERD may be the cause reassuring it is not exertional related no other symptoms with it.  Patient will also follow-up primary physician may consider an outpatient stress echo.  Patient return if any concerns agrees with plan, discharged with her .\"               Objective    /82 (BP Location: Right arm, Patient Position: Sitting, Cuff Size: Adult Large)   Pulse 67   Temp 98.8  F (37.1  C) (Oral)   Resp 16   Ht 1.727 m (5' 8\")   Wt 117.8 kg (259 lb 12.8 oz)   LMP  (LMP Unknown)   SpO2 95%   BMI 39.50 kg/m    Body mass index is 39.5 kg/m .  Physical Exam   GENERAL: alert and no distress  NECK: no adenopathy, no asymmetry, masses, or scars  RESP: lungs clear to auscultation - no rales, rhonchi or wheezes  CV: regular rate and rhythm, normal S1 S2, no S3 or S4, no murmur, click or rub, no peripheral edema  ABDOMEN: soft, nontender, no hepatosplenomegaly, no masses and bowel sounds normal  MS: no gross musculoskeletal defects noted, no significant edema, no tenderness to palpation of right lower leg.      Ida Bañuelos, BUBBAP-S     I, Natalie Garcia, DNP, APRN, CNP, reviewed and verified the nurse practitioner (NP) student's documentation of the patient's history.  I performed the exam and medical decision making activities with the NP student, who was present for learning purposes.      Signed Electronically by: Natalie Garcia NP    "

## 2025-03-11 NOTE — PATIENT INSTRUCTIONS
For heartburn and GERD try OTC Famotidine (Pepcid) for relief of acute symptoms.     For acute pain in the right leg try OTC ibuprofen to decrease inflammation.

## 2025-03-12 LAB
MAGNESIUM SERPL-MCNC: 2.2 MG/DL (ref 1.7–2.3)
TSH SERPL DL<=0.005 MIU/L-ACNC: 2.62 UIU/ML (ref 0.3–4.2)

## 2025-03-13 LAB
ANION GAP SERPL CALCULATED.3IONS-SCNC: 9 MMOL/L (ref 7–15)
BUN SERPL-MCNC: 15.3 MG/DL (ref 6–20)
CALCIUM SERPL-MCNC: 9.3 MG/DL (ref 8.8–10.4)
CHLORIDE SERPL-SCNC: 105 MMOL/L (ref 98–107)
CREAT SERPL-MCNC: 0.67 MG/DL (ref 0.51–0.95)
EGFRCR SERPLBLD CKD-EPI 2021: >90 ML/MIN/1.73M2
GLUCOSE SERPL-MCNC: 77 MG/DL (ref 70–99)
HCO3 SERPL-SCNC: 28 MMOL/L (ref 22–29)
POTASSIUM SERPL-SCNC: 4.2 MMOL/L (ref 3.4–5.3)
SODIUM SERPL-SCNC: 142 MMOL/L (ref 135–145)

## 2025-03-18 ENCOUNTER — OFFICE VISIT (OUTPATIENT)
Dept: URGENT CARE | Facility: URGENT CARE | Age: 55
End: 2025-03-18

## 2025-03-18 VITALS
TEMPERATURE: 97.6 F | SYSTOLIC BLOOD PRESSURE: 129 MMHG | RESPIRATION RATE: 18 BRPM | OXYGEN SATURATION: 98 % | DIASTOLIC BLOOD PRESSURE: 78 MMHG | HEART RATE: 70 BPM
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
## 2025-03-18 DIAGNOSIS — S61.451A HUMAN BITE OF RIGHT HAND, INITIAL ENCOUNTER: Primary | ICD-10-CM

## 2025-03-18 PROCEDURE — 3078F DIAST BP <80 MM HG: CPT | Performed by: PHYSICIAN ASSISTANT

## 2025-03-18 PROCEDURE — 99213 OFFICE O/P EST LOW 20 MIN: CPT | Performed by: PHYSICIAN ASSISTANT

## 2025-03-18 PROCEDURE — 3074F SYST BP LT 130 MM HG: CPT | Performed by: PHYSICIAN ASSISTANT

## 2025-03-18 PROCEDURE — 1125F AMNT PAIN NOTED PAIN PRSNT: CPT | Performed by: PHYSICIAN ASSISTANT

## 2025-03-18 RX ORDER — DOXYCYCLINE 100 MG/1
100 CAPSULE ORAL 2 TIMES DAILY
Qty: 20 CAPSULE | Refills: 0 | Status: SHIPPED | OUTPATIENT
Start: 2025-03-18 | End: 2025-03-28

## 2025-03-18 RX ORDER — METRONIDAZOLE 500 MG/1
500 TABLET ORAL 3 TIMES DAILY
Qty: 30 TABLET | Refills: 0 | Status: SHIPPED | OUTPATIENT
Start: 2025-03-18 | End: 2025-03-28

## 2025-03-18 ASSESSMENT — ENCOUNTER SYMPTOMS
CHILLS: 0
PALPITATIONS: 0
NECK PAIN: 0
BACK PAIN: 0
JOINT SWELLING: 1
NUMBNESS: 0
COLOR CHANGE: 1
NECK STIFFNESS: 0
CARDIOVASCULAR NEGATIVE: 1
FATIGUE: 0
MYALGIAS: 1
FEVER: 0
ARTHRALGIAS: 1
WOUND: 0

## 2025-03-18 ASSESSMENT — PAIN SCALES - GENERAL: PAINLEVEL_OUTOF10: MILD PAIN (2)
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
## 2025-05-27 ENCOUNTER — MYC MEDICAL ADVICE (OUTPATIENT)
Dept: FAMILY MEDICINE | Facility: CLINIC | Age: 55
End: 2025-05-27
Payer: COMMERCIAL

## 2025-05-27 ENCOUNTER — E-VISIT (OUTPATIENT)
Dept: FAMILY MEDICINE | Facility: CLINIC | Age: 55
End: 2025-05-27
Payer: COMMERCIAL

## 2025-05-27 DIAGNOSIS — F33.0 MAJOR DEPRESSIVE DISORDER, RECURRENT EPISODE, MILD: Primary | ICD-10-CM

## 2025-05-27 ASSESSMENT — PATIENT HEALTH QUESTIONNAIRE - PHQ9
SUM OF ALL RESPONSES TO PHQ QUESTIONS 1-9: 2
SUM OF ALL RESPONSES TO PHQ QUESTIONS 1-9: 2
10. IF YOU CHECKED OFF ANY PROBLEMS, HOW DIFFICULT HAVE THESE PROBLEMS MADE IT FOR YOU TO DO YOUR WORK, TAKE CARE OF THINGS AT HOME, OR GET ALONG WITH OTHER PEOPLE: NOT DIFFICULT AT ALL

## 2025-05-27 ASSESSMENT — ANXIETY QUESTIONNAIRES
IF YOU CHECKED OFF ANY PROBLEMS ON THIS QUESTIONNAIRE, HOW DIFFICULT HAVE THESE PROBLEMS MADE IT FOR YOU TO DO YOUR WORK, TAKE CARE OF THINGS AT HOME, OR GET ALONG WITH OTHER PEOPLE: NOT DIFFICULT AT ALL
5. BEING SO RESTLESS THAT IT IS HARD TO SIT STILL: NOT AT ALL
GAD7 TOTAL SCORE: 2
7. FEELING AFRAID AS IF SOMETHING AWFUL MIGHT HAPPEN: NOT AT ALL
2. NOT BEING ABLE TO STOP OR CONTROL WORRYING: NOT AT ALL
7. FEELING AFRAID AS IF SOMETHING AWFUL MIGHT HAPPEN: NOT AT ALL
3. WORRYING TOO MUCH ABOUT DIFFERENT THINGS: SEVERAL DAYS
4. TROUBLE RELAXING: NOT AT ALL
GAD7 TOTAL SCORE: 2
1. FEELING NERVOUS, ANXIOUS, OR ON EDGE: SEVERAL DAYS
GAD7 TOTAL SCORE: 2
6. BECOMING EASILY ANNOYED OR IRRITABLE: NOT AT ALL
8. IF YOU CHECKED OFF ANY PROBLEMS, HOW DIFFICULT HAVE THESE MADE IT FOR YOU TO DO YOUR WORK, TAKE CARE OF THINGS AT HOME, OR GET ALONG WITH OTHER PEOPLE?: NOT DIFFICULT AT ALL

## 2025-05-28 NOTE — TELEPHONE ENCOUNTER
"  Patient's MyChart message from 5/27/25 copied here-  \"I'm wondering if you would be willing to start prescribing Prestiq (Desvenlaflaxine) for me? The provider I was working with has left and I feel stable on it with no need for regular check ins. If you are willing to do so, I take one 25 mg tablet and one 50 mg tablet for a total of 75 mgs. I also usually ask them to order the Slate kind since I've had weird reactions with the other . I get my prescriptions from the [facility] in [facility]. I will need more in a week or so. Can you let me know if this will work?\"  "

## 2025-05-29 ENCOUNTER — ORDERS ONLY (AUTO-RELEASED) (OUTPATIENT)
Dept: FAMILY MEDICINE | Facility: CLINIC | Age: 55
End: 2025-05-29
Payer: COMMERCIAL

## 2025-05-29 DIAGNOSIS — Z12.11 SPECIAL SCREENING FOR MALIGNANT NEOPLASMS, COLON: ICD-10-CM

## 2025-05-29 RX ORDER — DESVENLAFAXINE 25 MG/1
25 TABLET, EXTENDED RELEASE ORAL DAILY
Qty: 90 TABLET | Refills: 0 | Status: SHIPPED | OUTPATIENT
Start: 2025-05-29

## 2025-05-29 RX ORDER — DESVENLAFAXINE 50 MG/1
50 TABLET, FILM COATED, EXTENDED RELEASE ORAL DAILY
Qty: 90 TABLET | Refills: 0 | Status: SHIPPED | OUTPATIENT
Start: 2025-05-29

## 2025-05-29 NOTE — PATIENT INSTRUCTIONS
Thank you for choosing us for your care. I have placed an order for your treatment:   Orders Placed This Encounter   Medications     desvenlafaxine succinate (PRISTIQ) 25 MG 24 hr tablet     Sig: Take 1 tablet (25 mg) by mouth daily. Take with the 50 mg tablet for total daily dose 75 mg.     Dispense:  90 tablet     Refill:  0     desvenlafaxine (PRISTIQ) 50 MG 24 hr tablet     Sig: Take 1 tablet (50 mg) by mouth daily. Take with the 25 mg tablet for total daily dose 75 mg.     Dispense:  90 tablet     Refill:  0    View your full visit summary for details by clicking on the link below. Your pharmacist will able to address any questions you may have about the medication.      If you're not feeling better within 4-6 weeks please schedule an appointment.  You can schedule an appointment right here in [facility], or call [phone]  If the visit is for the same symptoms as your eVisit, we'll refund the cost of your eVisit if seen within seven days.                 My Depression Action Plan  Name: Nazia Jasso   Date of Birth 1970  Date: 5/29/2025    My doctor: Natalie Garcia   My clinic: [address] [address] [phone]          GREEN    ZONE   Good Control    What it looks like:   Things are going generally well. You have normal ups and downs. You may even feel depressed from time to time, but bad moods usually last less than a day.   What you need to do:  Continue to care for yourself (see self care plan)  Check your depression survival kit and update it as needed  Follow your physician s recommendations including any medication.  Do not stop taking medication unless you consult with your physician first.           YELLOW         ZONE Getting Worse    What it looks like:   Depression is starting to interfere with your life.   It may be hard to get out of bed; you may be starting to isolate yourself from others.  Symptoms of  depression are starting to last most all day and this has happened for several days.   You may have suicidal thoughts but they are not constant.   What you need to do:     Call your care team. Your response to treatment will improve if you keep your care team informed of your progress. Yellow periods are signs an adjustment may need to be made.     Continue your self-care.  Just get dressed and ready for the day.  Don't give yourself time to talk yourself out of it.    Talk to someone in your support network.    Open up your Depression Self-Care Plan/Wellness Kit.           RED    ZONE Medical Alert - Get Help    What it looks like:   Depression is seriously interfering with your life.   You may experience these or other symptoms: You can t get out of bed most days, can t work or engage in other necessary activities, you have trouble taking care of basic hygiene, or basic responsibilities, thoughts of suicide or death that will not go away, self-injurious behavior.     What you need to do:  Call your care team and request a same-day appointment. If they are not available (weekends or after hours) call your local crisis line, emergency room or 911.          Depression Self-Care Plan / Wellness Kit    Many people find that medication and therapy are helpful treatments for managing depression. In addition, making small changes to your everyday life can help to boost your mood and improve your wellbeing. Below are some tips for you to consider. Be sure to talk with your medical provider and/or behavioral health consultant if your symptoms are worsening or not improving.     Sleep   Sleep hygiene  means all of the habits that support good, restful sleep. It includes maintaining a consistent bedtime and wake time, using your bedroom only for sleeping or sex, and keeping the bedroom dark and free of distractions like a computer, smartphone, or television.     Develop a Healthy Routine  Maintain good hygiene. Get out of bed  in the morning, make your bed, brush your teeth, take a shower, and get dressed. Don t spend too much time viewing media that makes you feel stressed. Find time to relax each day.    Exercise  Get some form of exercise every day. This will help reduce pain and release endorphins, the  feel good  chemicals in your brain. It can be as simple as just going for a walk or doing some gardening, anything that will get you moving.      Diet  Strive to eat healthy foods, including fruits and vegetables. Drink plenty of water. Avoid excessive sugar, caffeine, alcohol, and other mood-altering substances.     Stay Connected with Others  Stay in touch with friends and family members.    Manage Your Mood  Try deep breathing, massage therapy, biofeedback, or meditation. Take part in fun activities when you can. Try to find something to smile about each day.     Psychotherapy  Be open to working with a therapist if your provider recommends it.     Medication  Be sure to take your medication as prescribed. Most anti-depressants need to be taken every day. It usually takes several weeks for medications to work. Not all medicines work for all people. It is important to follow-up with your provider to make sure you have a treatment plan that is working for you. Do not stop your medication abruptly without first discussing it with your provider.    Crisis Resources   These hotlines are for both adults and children. They and are open 24 hours a day, 7 days a week unless noted otherwise.    National Suicide Prevention Lifeline   988 or 5-391-220-TALK (8010)    Crisis Text Line    www.crisistextline.org  Text HOME to 363530 from anywhere in the [place], anytime, about any type of crisis. A live, trained crisis counselor will receive the text and respond quickly.    [place] Lifeline for LGBTQ Youth  A national crisis intervention and suicide lifeline for LGBTQ youth under 25. Provides a safe place to talk without judgement. Call  2-837-652-1779; text START to 458402 or visit www.thetrevorproject.org to talk to a trained counselor.    For Critical access hospital crisis numbers, visit the [place] DHS website at:  https://[place].gov/dhs/people-we-serve/adults/health-care/mental-health/resources/crisis-contacts.jsp

## 2025-06-10 DIAGNOSIS — Z83.49 FAMILY HISTORY OF THYROID PROBLEM: ICD-10-CM

## 2025-06-10 DIAGNOSIS — R94.6 ABNORMAL FINDING ON THYROID FUNCTION TEST: ICD-10-CM

## 2025-06-11 ENCOUNTER — ANCILLARY PROCEDURE (OUTPATIENT)
Dept: CARDIOLOGY | Facility: CLINIC | Age: 55
End: 2025-06-11
Attending: NURSE PRACTITIONER
Payer: COMMERCIAL

## 2025-06-11 ENCOUNTER — RESULTS FOLLOW-UP (OUTPATIENT)
Dept: FAMILY MEDICINE | Facility: CLINIC | Age: 55
End: 2025-06-11

## 2025-06-11 DIAGNOSIS — R07.9 ACUTE CHEST PAIN: ICD-10-CM

## 2025-06-11 DIAGNOSIS — R94.39 ABNORMAL STRESS TEST: Primary | ICD-10-CM

## 2025-06-11 PROCEDURE — 93321 DOPPLER ECHO F-UP/LMTD STD: CPT | Performed by: STUDENT IN AN ORGANIZED HEALTH CARE EDUCATION/TRAINING PROGRAM

## 2025-06-11 PROCEDURE — 93325 DOPPLER ECHO COLOR FLOW MAPG: CPT | Performed by: STUDENT IN AN ORGANIZED HEALTH CARE EDUCATION/TRAINING PROGRAM

## 2025-06-11 PROCEDURE — 93016 CV STRESS TEST SUPVJ ONLY: CPT | Performed by: STUDENT IN AN ORGANIZED HEALTH CARE EDUCATION/TRAINING PROGRAM

## 2025-06-11 PROCEDURE — 93352 ADMIN ECG CONTRAST AGENT: CPT | Performed by: STUDENT IN AN ORGANIZED HEALTH CARE EDUCATION/TRAINING PROGRAM

## 2025-06-11 PROCEDURE — 93350 STRESS TTE ONLY: CPT | Performed by: STUDENT IN AN ORGANIZED HEALTH CARE EDUCATION/TRAINING PROGRAM

## 2025-06-11 PROCEDURE — 93018 CV STRESS TEST I&R ONLY: CPT | Performed by: INTERNAL MEDICINE

## 2025-06-11 PROCEDURE — 93017 CV STRESS TEST TRACING ONLY: CPT | Performed by: STUDENT IN AN ORGANIZED HEALTH CARE EDUCATION/TRAINING PROGRAM

## 2025-06-11 RX ADMIN — Medication 5 ML: at 09:39

## 2025-06-11 NOTE — TELEPHONE ENCOUNTER
"Last Written Prescription:     levothyroxine (SYNTHROID/LEVOTHROID) 50 MCG tablet 90 tablet 3 6/17/2024 -- No   Sig - Route: Take 1 tablet (50 mcg) by mouth daily - Oral     liothyronine (CYTOMEL) 5 MCG tablet 90 tablet 3 6/17/2024 -- No   Sig - Route: Take 0.5 tablets (2.5 mcg) by mouth 2 times daily - Ora     ----------------------  Last Visit Date: 6/14/24 Dr. Augie Trinidad  [facility]  Future Visit Date: None  ----------------------  TSH   Date Value Ref Range Status   03/11/2025 2.62 0.30 - 4.20 uIU/mL Final   04/14/2022 1.87 0.40 - 4.00 mU/L Final   05/28/2021 2.42 0.40 - 4.00 mU/L Final     T4 Free   Date Value Ref Range Status   05/28/2021 1.12 0.76 - 1.46 ng/dL Final     Free T4   Date Value Ref Range Status   12/07/2022 1.12 0.90 - 1.70 ng/dL Final          Refill decision: Medication unable to be refilled by RN due to: Other:  Last seen 6/17/24 with follow up: \"Follow up: PRN. Could graduate from endocrine and follow up with PCP.\" No upcoming appt is scheduled   Cytomel is not on refill protocol. TSH done 3/11/25  Refills pended for 90 days, please advise continue with Endocrine provider or graduate to PCP?      Request from pharmacy:  Requested Prescriptions   Pending Prescriptions Disp Refills    levothyroxine (SYNTHROID/LEVOTHROID) 50 MCG tablet 90 tablet 3     Sig: Take 1 tablet (50 mcg) by mouth daily.       Thyroid Protocol Failed - 6/11/2025 10:21 AM        Failed - Medication indicated for associated diagnosis     Medication is associated with one or more of the following diagnoses:  Hypothyroidism  Thyroid stimulating hormone suppression therapy  Thyroid cancer  Acquired atrophy of thyroid          Passed - Patient is 12 years or older        Passed - Medication is active on med list and the sig matches. RN to manually verify dose and sig if red X/fail.     If the protocol passes (green check), you do not need to verify med dose and sig.    A prescription matches if they are the same clinical " intention.    For Example: once daily and every morning are the same.    The protocol can not identify upper and lower case letters as matching and will fail.     For Example: Take 1 tablet (50 mg) by mouth daily     TAKE 1 TABLET (50 MG) BY MOUTH DAILY    For all fails (red x), verify dose and sig.    If the refill does match what is on file, the RN can still proceed to approve the refill request.       If they do not match, route to the appropriate provider.             Passed - Recent (12 month) or future (90 days) visit with authorizing provider's specialty (provided they have been seen in the past 15 months)     The patient must have completed an in-person or virtual visit within the past 12 months or has a future visit scheduled within the next 90 days with the authorizing provider s specialty.  Urgent care and e-visits do not qualify as an office visit for this protocol.          Passed - Normal TSH on file in past 12 months     Recent Labs   Lab Test 03/11/25  0953   TSH 2.62              Passed - No active pregnancy on record     If patient is pregnant or has had a positive pregnancy test, please check TSH.          Passed - No positive pregnancy test in past 12 months     If patient is pregnant or has had a positive pregnancy test, please check TSH.            liothyronine (CYTOMEL) 5 MCG tablet 90 tablet 3     Sig: Take 0.5 tablets (2.5 mcg) by mouth 2 times daily.       Thyroid Protocol Failed - 6/11/2025 10:21 AM        Failed - Medication indicated for associated diagnosis     Medication is associated with one or more of the following diagnoses:  Hypothyroidism  Thyroid stimulating hormone suppression therapy  Thyroid cancer  Acquired atrophy of thyroid          Passed - Patient is 12 years or older        Passed - Medication is active on med list and the sig matches. RN to manually verify dose and sig if red X/fail.     If the protocol passes (green check), you do not need to verify med dose and sig.     A prescription matches if they are the same clinical intention.    For Example: once daily and every morning are the same.    The protocol can not identify upper and lower case letters as matching and will fail.     For Example: Take 1 tablet (50 mg) by mouth daily     TAKE 1 TABLET (50 MG) BY MOUTH DAILY    For all fails (red x), verify dose and sig.    If the refill does match what is on file, the RN can still proceed to approve the refill request.       If they do not match, route to the appropriate provider.             Passed - Recent (12 month) or future (90 days) visit with authorizing provider's specialty (provided they have been seen in the past 15 months)     The patient must have completed an in-person or virtual visit within the past 12 months or has a future visit scheduled within the next 90 days with the authorizing provider s specialty.  Urgent care and e-visits do not qualify as an office visit for this protocol.          Passed - Normal TSH on file in past 12 months     Recent Labs   Lab Test 03/11/25  0953   TSH 2.62              Passed - No active pregnancy on record     If patient is pregnant or has had a positive pregnancy test, please check TSH.          Passed - No positive pregnancy test in past 12 months     If patient is pregnant or has had a positive pregnancy test, please check TSH.

## 2025-06-12 RX ORDER — LEVOTHYROXINE SODIUM 50 UG/1
50 TABLET ORAL DAILY
Qty: 90 TABLET | Refills: 0 | Status: SHIPPED | OUTPATIENT
Start: 2025-06-12

## 2025-06-12 RX ORDER — LIOTHYRONINE SODIUM 5 UG/1
2.5 TABLET ORAL 2 TIMES DAILY
Qty: 90 TABLET | Refills: 0 | Status: SHIPPED | OUTPATIENT
Start: 2025-06-12

## 2025-06-17 ENCOUNTER — OFFICE VISIT (OUTPATIENT)
Dept: CARDIOLOGY | Facility: CLINIC | Age: 55
End: 2025-06-17
Payer: COMMERCIAL

## 2025-06-17 VITALS
HEIGHT: 68 IN | OXYGEN SATURATION: 94 % | BODY MASS INDEX: 39.71 KG/M2 | DIASTOLIC BLOOD PRESSURE: 83 MMHG | WEIGHT: 262 LBS | HEART RATE: 73 BPM | SYSTOLIC BLOOD PRESSURE: 138 MMHG

## 2025-06-17 DIAGNOSIS — R94.39 ABNORMAL STRESS TEST: ICD-10-CM

## 2025-06-17 LAB — NONINV COLON CA DNA+OCC BLD SCRN STL QL: NEGATIVE

## 2025-06-17 NOTE — LETTER
6/17/2025    LEXIS Collado CNP  [address] [facility] [address]    RE: Nazia Modibridge       Dear Colleague,     I had the pleasure of seeing Nazia Jasso in the [facility].  Cardiology Consultation      Nazia Jasso MRN# [id number]   YOB: 1970 Age: 54 year old   Date of Visit 06/17/2025     Reason for consult: Abnormal stress test           Assessment and Plan:     Possible false positive stress echocardiogram without exertional symptoms  To my review, I believe the apex contracts normally and rib shadowing confounded reporting.  Plan CT coronary angiogram, if abnormal consider invasive angiography.      Right calf discomfort in the evenings  Recommend compression stockings and if continued symptoms but some improvement then venous competency testing.    45 minutes spent today in review of past medical record, discussion with patient and postvisit charting    This note was transcribed using electronic voice recognition software, typographical errors may be present.    The longitudinal plan of care for the diagnosis(es)/condition(s) as documented were addressed during this visit. Due to the added complexity in care, I will continue to support Nazia in the subsequent management and with ongoing continuity of care.                  Chief Complaint:   New Patient (Abnormal stress test, per Natalie Garcia CNP)           History of Present Illness:   This patient is a very pleasant 54 year old female who had nocturnal chest discomfort but no exertional symptoms in March.  She had a bike stress echocardiogram that was reported as abnormal for LAD territory ischemia.    We reviewed the images of this stress test in the clinic today together.  I do not feel that there is clear LAD territory ischemia.  I believe that the apex contracts normally and that rib shadowing confounded the interpretation and report.    She denies  "any exertional chest discomfort and she did not have any symptoms when she did the stress test.    She has right calf discomfort in the evenings after being on her feet.  She has not worn compression stockings.  She has mild telangiectasias.  She had ultrasound that ruled out DVT in March.         Physical Exam:     Vitals: /83 (BP Location: Right arm, Patient Position: Sitting)   Pulse 73   Ht 1.727 m (5' 8\")   Wt 118.8 kg (262 lb)   LMP  (LMP Unknown)   SpO2 94%   BMI 39.84 kg/m    Constitutional:  cooperative, alert and oriented, well developed, well nourished, in no acute distress overweight      Skin:  warm and dry to the touch, no apparent skin lesions or masses noted        Head:  normocephalic, no masses or lesions        Eyes:  pupils equal and round, conjunctivae and lids unremarkable, sclera white, no xanthalasma, EOMS intact, no nystagmus        ENT:  no pallor or cyanosis        Neck:  JVP normal        Chest:  clear to auscultation        Cardiac: regular rhythm, normal S1 and S2, no murmurs, gallops or rubs detected                  Abdomen:  BS normoactive        Extremities and Back:  no deformities, clubbing, cyanosis, erythema observed        Neurological:  no gross motor deficits, affect appropriate                      Past Medical History:   I have reviewed this patient's past medical history  Past Medical History:   Diagnosis Date     ASCUS of cervix with negative high risk HPV 06/17/2016 6/17/16 ASCUS/neg HR HPV.      Depressive disorder 1997     Endometriosis, site unspecified     Endometriosis     Female infertility of unspecified origin     Female infertility     Melanoma of abdomen (H) 08/29/2023     Thyroid disease              Past Surgical History:   I have reviewed this patient's past surgical history  History reviewed. No pertinent surgical history.            Social History:   I have reviewed this patient's social history  Social History     Tobacco Use     Smoking " status: Never     Passive exposure: Never     Smokeless tobacco: Never   Substance Use Topics     Alcohol use: Yes     Comment: 1-2 drink per month             Family History:   I have reviewed this patient's family history  Family History   Problem Relation Age of Onset     C.A.D. Paternal Grandfather      Cerebrovascular Disease Paternal Grandfather      C.A.D. Maternal Grandfather      Hypertension Mother      Hypertension Maternal Grandmother      Prostate Cancer Father      Thyroid Disease Sister      Hypothyroidism Sister      No Known Problems Brother      Diabetes No family hx of      Breast Cancer No family hx of      Cancer - colorectal No family hx of              Allergies:     Allergies   Allergen Reactions     Septra Ds [Sulfamethoxazole W-Trimethoprim] Hives     Happened at end of course from 12/01/2011-12/     Amoxicillin      Erythromycin      hives     Penicillins      hives             Medications:   I have reviewed this patient's current medications  Current Outpatient Medications   Medication Sig Dispense Refill     Cholecalciferol (VITAMIN D-3) 5000 units TABS        desvenlafaxine (PRISTIQ) 50 MG 24 hr tablet Take 1 tablet (50 mg) by mouth daily. Take with the 25 mg tablet for total daily dose 75 mg. 90 tablet 0     desvenlafaxine succinate (PRISTIQ) 25 MG 24 hr tablet Take 1 tablet (25 mg) by mouth daily. Take with the 50 mg tablet for total daily dose 75 mg. 90 tablet 0     estradiol (ESTRACE) 0.1 MG/GM vaginal cream Place 1 g vaginally three times a week. Start with 500 mg to 1 g/day intravaginally for 2 weeks, then 1 g three times per week 42.5 g 3     levothyroxine (SYNTHROID/LEVOTHROID) 50 MCG tablet Take 1 tablet (50 mcg) by mouth daily. 90 tablet 0     liothyronine (CYTOMEL) 5 MCG tablet Take 0.5 tablets (2.5 mcg) by mouth 2 times daily. 90 tablet 0     Multiple Vitamin (MULTIVITAMINS PO) Take by mouth.       cyclobenzaprine (FLEXERIL) 5 MG tablet Take 1/2-1 tab prior to  "bedtime, prn.  If still symptomatic or if still not sleeping well and well-tolerated can increase by half tablet (half milligram increments) up to 2 tablets prior to bedtime, as needed. (Patient not taking: Reported on 6/17/2025) 45 tablet 2               Review of Systems:       Review of Systems:  Skin:  not assessed     Eyes:  Positive for glasses  ENT:  not assessed    Respiratory:  Positive for dyspnea on exertion, sleep apnea, CPAP  Cardiovascular:  Negative, palpitations, chest pain, lightheadedness, dizziness    Gastroenterology: not assessed    Genitourinary:  not assessed    Musculoskeletal:  Positive for joint pain  Neurologic:  Positive for numbness or tingling of hands  Psychiatric:  Positive for anxiety  Heme/Lymph/Imm:  not assessed    Endocrine:  Positive for thyroid disorder                     Data:   All available laboratory data reviewed  Lab Results   Component Value Date    CHOL 156 10/03/2024    CHOL 159 01/11/2019     Lab Results   Component Value Date    HDL 54 10/03/2024    HDL 57 01/11/2019     Lab Results   Component Value Date    LDL 87 10/03/2024    LDL 88 01/11/2019     Lab Results   Component Value Date    TRIG 74 10/03/2024    TRIG 70 01/11/2019     No results found for: \"CHOLHDLRATIO\"  TSH   Date Value Ref Range Status   03/11/2025 2.62 0.30 - 4.20 uIU/mL Final   04/14/2022 1.87 0.40 - 4.00 mU/L Final   05/28/2021 2.42 0.40 - 4.00 mU/L Final     Last Basic Metabolic Panel:  Lab Results   Component Value Date     03/11/2025     03/09/2021      Lab Results   Component Value Date    POTASSIUM 4.2 03/11/2025    POTASSIUM 4.0 03/09/2021     Lab Results   Component Value Date    CHLORIDE 105 03/11/2025    CHLORIDE 107 03/09/2021     Lab Results   Component Value Date    NGHIA 9.3 03/11/2025    NGHIA 9.3 03/09/2021     Lab Results   Component Value Date    CO2 28 03/11/2025    CO2 26 03/09/2021     Lab Results   Component Value Date    BUN 15.3 03/11/2025    BUN 11 03/09/2021 "     Lab Results   Component Value Date    CR 0.67 03/11/2025    CR 0.63 03/09/2021     Lab Results   Component Value Date    GLC 77 03/11/2025    GLC 88 04/14/2022     03/09/2021     Lab Results   Component Value Date    WBC 4.3 03/01/2025    WBC 5.1 03/09/2021     Lab Results   Component Value Date    RBC 4.63 03/01/2025    RBC 4.55 03/09/2021     Lab Results   Component Value Date    HGB 13.6 03/01/2025    HGB 13.4 03/09/2021     Lab Results   Component Value Date    HCT 42.1 03/01/2025    HCT 41.3 03/09/2021     Lab Results   Component Value Date    MCV 91 03/01/2025    MCV 91 03/09/2021     Lab Results   Component Value Date    MCH 29.4 03/01/2025    MCH 29.5 03/09/2021     Lab Results   Component Value Date    MCHC 32.3 03/01/2025    MCHC 32.4 03/09/2021     Lab Results   Component Value Date    RDW 12.8 03/01/2025    RDW 13.2 03/09/2021     Lab Results   Component Value Date     03/01/2025     03/09/2021     Thank you for allowing me to participate in the care of your patient.      Sincerely,     Daniele Doe MD     [facility]  cc:   Natalie Garcia, APRN CNP  [address] [place] [address]

## 2025-06-17 NOTE — PATIENT INSTRUCTIONS
CT coronary angiogram to look to see if there are blockages, I'm suspecting that it's a false positive stress test.    For your leg, try 20-30mmHg compression stockings off [employer] and see if that helps the symptoms. If it does but they're still there, you can message me and we can do a formal ultrasound looking at the vein valves and there are procedures we can do to improve the flow.

## 2025-06-17 NOTE — PROGRESS NOTES
Cardiology Consultation      Nazia Jasso MRN# [id number]   YOB: 1970 Age: 54 year old   Date of Visit 06/17/2025     Reason for consult: Abnormal stress test           Assessment and Plan:     Possible false positive stress echocardiogram without exertional symptoms  To my review, I believe the apex contracts normally and rib shadowing confounded reporting.  Plan CT coronary angiogram, if abnormal consider invasive angiography.      Right calf discomfort in the evenings  Recommend compression stockings and if continued symptoms but some improvement then venous competency testing.    45 minutes spent today in review of past medical record, discussion with patient and postvisit charting    This note was transcribed using electronic voice recognition software, typographical errors may be present.    The longitudinal plan of care for the diagnosis(es)/condition(s) as documented were addressed during this visit. Due to the added complexity in care, I will continue to support Nazia in the subsequent management and with ongoing continuity of care.                  Chief Complaint:   New Patient (Abnormal stress test, per Natalie Garcia CNP)           History of Present Illness:   This patient is a very pleasant 54 year old female who had nocturnal chest discomfort but no exertional symptoms in March.  She had a bike stress echocardiogram that was reported as abnormal for LAD territory ischemia.    We reviewed the images of this stress test in the clinic today together.  I do not feel that there is clear LAD territory ischemia.  I believe that the apex contracts normally and that rib shadowing confounded the interpretation and report.    She denies any exertional chest discomfort and she did not have any symptoms when she did the stress test.    She has right calf discomfort in the evenings after being on her feet.  She has not worn compression stockings.  She has mild telangiectasias.   "She had ultrasound that ruled out DVT in March.         Physical Exam:     Vitals: /83 (BP Location: Right arm, Patient Position: Sitting)   Pulse 73   Ht 1.727 m (5' 8\")   Wt 118.8 kg (262 lb)   LMP  (LMP Unknown)   SpO2 94%   BMI 39.84 kg/m    Constitutional:  cooperative, alert and oriented, well developed, well nourished, in no acute distress overweight      Skin:  warm and dry to the touch, no apparent skin lesions or masses noted        Head:  normocephalic, no masses or lesions        Eyes:  pupils equal and round, conjunctivae and lids unremarkable, sclera white, no xanthalasma, EOMS intact, no nystagmus        ENT:  no pallor or cyanosis        Neck:  JVP normal        Chest:  clear to auscultation        Cardiac: regular rhythm, normal S1 and S2, no murmurs, gallops or rubs detected                  Abdomen:  BS normoactive        Extremities and Back:  no deformities, clubbing, cyanosis, erythema observed        Neurological:  no gross motor deficits, affect appropriate                      Past Medical History:   I have reviewed this patient's past medical history  Past Medical History:   Diagnosis Date    ASCUS of cervix with negative high risk HPV 06/17/2016 6/17/16 ASCUS/neg HR HPV.     Depressive disorder 1997    Endometriosis, site unspecified     Endometriosis    Female infertility of unspecified origin     Female infertility    Melanoma of abdomen (H) 08/29/2023    Thyroid disease              Past Surgical History:   I have reviewed this patient's past surgical history  History reviewed. No pertinent surgical history.            Social History:   I have reviewed this patient's social history  Social History     Tobacco Use    Smoking status: Never     Passive exposure: Never    Smokeless tobacco: Never   Substance Use Topics    Alcohol use: Yes     Comment: 1-2 drink per month             Family History:   I have reviewed this patient's family history  Family History   Problem " Relation Age of Onset    C.A.D. Paternal Grandfather     Cerebrovascular Disease Paternal Grandfather     C.A.D. Maternal Grandfather     Hypertension Mother     Hypertension Maternal Grandmother     Prostate Cancer Father     Thyroid Disease Sister     Hypothyroidism Sister     No Known Problems Brother     Diabetes No family hx of     Breast Cancer No family hx of     Cancer - colorectal No family hx of              Allergies:     Allergies   Allergen Reactions    Septra Ds [Sulfamethoxazole W-Trimethoprim] Hives     Happened at end of course from 12/01/2011-12/    Amoxicillin     Erythromycin      hives    Penicillins      hives             Medications:   I have reviewed this patient's current medications  Current Outpatient Medications   Medication Sig Dispense Refill    Cholecalciferol (VITAMIN D-3) 5000 units TABS       desvenlafaxine (PRISTIQ) 50 MG 24 hr tablet Take 1 tablet (50 mg) by mouth daily. Take with the 25 mg tablet for total daily dose 75 mg. 90 tablet 0    desvenlafaxine succinate (PRISTIQ) 25 MG 24 hr tablet Take 1 tablet (25 mg) by mouth daily. Take with the 50 mg tablet for total daily dose 75 mg. 90 tablet 0    estradiol (ESTRACE) 0.1 MG/GM vaginal cream Place 1 g vaginally three times a week. Start with 500 mg to 1 g/day intravaginally for 2 weeks, then 1 g three times per week 42.5 g 3    levothyroxine (SYNTHROID/LEVOTHROID) 50 MCG tablet Take 1 tablet (50 mcg) by mouth daily. 90 tablet 0    liothyronine (CYTOMEL) 5 MCG tablet Take 0.5 tablets (2.5 mcg) by mouth 2 times daily. 90 tablet 0    Multiple Vitamin (MULTIVITAMINS PO) Take by mouth.      cyclobenzaprine (FLEXERIL) 5 MG tablet Take 1/2-1 tab prior to bedtime, prn.  If still symptomatic or if still not sleeping well and well-tolerated can increase by half tablet (half milligram increments) up to 2 tablets prior to bedtime, as needed. (Patient not taking: Reported on 6/17/2025) 45 tablet 2               Review of Systems:  "      Review of Systems:  Skin:  not assessed     Eyes:  Positive for glasses  ENT:  not assessed    Respiratory:  Positive for dyspnea on exertion, sleep apnea, CPAP  Cardiovascular:  Negative, palpitations, chest pain, lightheadedness, dizziness    Gastroenterology: not assessed    Genitourinary:  not assessed    Musculoskeletal:  Positive for joint pain  Neurologic:  Positive for numbness or tingling of hands  Psychiatric:  Positive for anxiety  Heme/Lymph/Imm:  not assessed    Endocrine:  Positive for thyroid disorder                     Data:   All available laboratory data reviewed  Lab Results   Component Value Date    CHOL 156 10/03/2024    CHOL 159 01/11/2019     Lab Results   Component Value Date    HDL 54 10/03/2024    HDL 57 01/11/2019     Lab Results   Component Value Date    LDL 87 10/03/2024    LDL 88 01/11/2019     Lab Results   Component Value Date    TRIG 74 10/03/2024    TRIG 70 01/11/2019     No results found for: \"CHOLHDLRATIO\"  TSH   Date Value Ref Range Status   03/11/2025 2.62 0.30 - 4.20 uIU/mL Final   04/14/2022 1.87 0.40 - 4.00 mU/L Final   05/28/2021 2.42 0.40 - 4.00 mU/L Final     Last Basic Metabolic Panel:  Lab Results   Component Value Date     03/11/2025     03/09/2021      Lab Results   Component Value Date    POTASSIUM 4.2 03/11/2025    POTASSIUM 4.0 03/09/2021     Lab Results   Component Value Date    CHLORIDE 105 03/11/2025    CHLORIDE 107 03/09/2021     Lab Results   Component Value Date    NGHIA 9.3 03/11/2025    NGHIA 9.3 03/09/2021     Lab Results   Component Value Date    CO2 28 03/11/2025    CO2 26 03/09/2021     Lab Results   Component Value Date    BUN 15.3 03/11/2025    BUN 11 03/09/2021     Lab Results   Component Value Date    CR 0.67 03/11/2025    CR 0.63 03/09/2021     Lab Results   Component Value Date    GLC 77 03/11/2025    GLC 88 04/14/2022     03/09/2021     Lab Results   Component Value Date    WBC 4.3 03/01/2025    WBC 5.1 03/09/2021     Lab " Results   Component Value Date    RBC 4.63 03/01/2025    RBC 4.55 03/09/2021     Lab Results   Component Value Date    HGB 13.6 03/01/2025    HGB 13.4 03/09/2021     Lab Results   Component Value Date    HCT 42.1 03/01/2025    HCT 41.3 03/09/2021     Lab Results   Component Value Date    MCV 91 03/01/2025    MCV 91 03/09/2021     Lab Results   Component Value Date    MCH 29.4 03/01/2025    MCH 29.5 03/09/2021     Lab Results   Component Value Date    MCHC 32.3 03/01/2025    MCHC 32.4 03/09/2021     Lab Results   Component Value Date    RDW 12.8 03/01/2025    RDW 13.2 03/09/2021     Lab Results   Component Value Date     03/01/2025     03/09/2021

## 2025-07-17 ENCOUNTER — PATIENT OUTREACH (OUTPATIENT)
Dept: CARE COORDINATION | Facility: CLINIC | Age: 55
End: 2025-07-17
Payer: COMMERCIAL

## 2025-07-30 ENCOUNTER — HOSPITAL ENCOUNTER (OUTPATIENT)
Dept: CARDIOLOGY | Facility: CLINIC | Age: 55
Discharge: HOME | End: 2025-07-30
Attending: INTERNAL MEDICINE
Payer: COMMERCIAL

## 2025-07-30 VITALS — DIASTOLIC BLOOD PRESSURE: 75 MMHG | HEART RATE: 50 BPM | SYSTOLIC BLOOD PRESSURE: 141 MMHG

## 2025-07-30 DIAGNOSIS — R94.39 ABNORMAL STRESS TEST: ICD-10-CM

## 2025-07-30 PROCEDURE — 250N000011 HC RX IP 250 OP 636: Performed by: INTERNAL MEDICINE

## 2025-07-30 PROCEDURE — 250N000013 HC RX MED GY IP 250 OP 250 PS 637: Performed by: INTERNAL MEDICINE

## 2025-07-30 PROCEDURE — 75574 CT ANGIO HRT W/3D IMAGE: CPT

## 2025-07-30 RX ORDER — IVABRADINE 5 MG/1
5-15 TABLET, FILM COATED ORAL
Status: COMPLETED | OUTPATIENT
Start: 2025-07-30 | End: 2025-07-30

## 2025-07-30 RX ORDER — DILTIAZEM HCL 60 MG
120 TABLET ORAL
Status: DISCONTINUED | OUTPATIENT
Start: 2025-07-30 | End: 2025-07-31 | Stop reason: HOSPADM

## 2025-07-30 RX ORDER — METOPROLOL TARTRATE 1 MG/ML
5-20 INJECTION, SOLUTION INTRAVENOUS
Status: DISCONTINUED | OUTPATIENT
Start: 2025-07-30 | End: 2025-07-31 | Stop reason: HOSPADM

## 2025-07-30 RX ORDER — IOPAMIDOL 755 MG/ML
500 INJECTION, SOLUTION INTRAVASCULAR ONCE
Status: DISCONTINUED | OUTPATIENT
Start: 2025-07-30 | End: 2025-07-31 | Stop reason: HOSPADM

## 2025-07-30 RX ORDER — METOPROLOL TARTRATE 25 MG/1
25-100 TABLET, FILM COATED ORAL
Status: COMPLETED | OUTPATIENT
Start: 2025-07-30 | End: 2025-07-30

## 2025-07-30 RX ORDER — DILTIAZEM HYDROCHLORIDE 5 MG/ML
10-15 INJECTION INTRAVENOUS
Status: DISCONTINUED | OUTPATIENT
Start: 2025-07-30 | End: 2025-07-31 | Stop reason: HOSPADM

## 2025-07-30 RX ORDER — NITROGLYCERIN 0.4 MG/1
0.4 TABLET SUBLINGUAL
Status: COMPLETED | OUTPATIENT
Start: 2025-07-30 | End: 2025-07-30

## 2025-07-30 RX ORDER — LIDOCAINE 40 MG/G
CREAM TOPICAL
Status: DISCONTINUED | OUTPATIENT
Start: 2025-07-30 | End: 2025-07-31 | Stop reason: HOSPADM

## 2025-07-30 RX ORDER — ONDANSETRON 2 MG/ML
4 INJECTION INTRAMUSCULAR; INTRAVENOUS
Status: DISCONTINUED | OUTPATIENT
Start: 2025-07-30 | End: 2025-07-31 | Stop reason: HOSPADM

## 2025-07-30 RX ADMIN — IVABRADINE 10 MG: 5 TABLET, FILM COATED ORAL at 14:35

## 2025-07-30 RX ADMIN — IOPAMIDOL 230 ML: 755 INJECTION, SOLUTION INTRAVENOUS at 16:15

## 2025-07-30 RX ADMIN — NITROGLYCERIN 0.4 MG: 0.4 TABLET SUBLINGUAL at 16:35

## 2025-07-30 RX ADMIN — METOPROLOL TARTRATE 50 MG: 50 TABLET, FILM COATED ORAL at 14:35

## 2025-07-30 RX ADMIN — NITROGLYCERIN 0.4 MG: 0.4 TABLET SUBLINGUAL at 16:06

## 2025-07-31 RX ORDER — IOPAMIDOL 755 MG/ML
500 INJECTION, SOLUTION INTRAVASCULAR ONCE
Status: COMPLETED | OUTPATIENT
Start: 2025-07-30 | End: 2025-07-30

## 2025-08-20 ENCOUNTER — ANCILLARY PROCEDURE (OUTPATIENT)
Dept: MAMMOGRAPHY | Facility: CLINIC | Age: 55
End: 2025-08-20
Attending: NURSE PRACTITIONER
Payer: COMMERCIAL

## 2025-08-20 DIAGNOSIS — Z12.31 VISIT FOR SCREENING MAMMOGRAM: ICD-10-CM

## 2025-08-20 PROCEDURE — 77067 SCR MAMMO BI INCL CAD: CPT | Mod: TC | Performed by: RADIOLOGY

## 2025-08-20 PROCEDURE — 77063 BREAST TOMOSYNTHESIS BI: CPT | Mod: TC | Performed by: RADIOLOGY

## 2025-09-03 ENCOUNTER — PATIENT OUTREACH (OUTPATIENT)
Dept: CARE COORDINATION | Facility: CLINIC | Age: 55
End: 2025-09-03
Payer: COMMERCIAL

## 2025-09-04 DIAGNOSIS — F33.0 MAJOR DEPRESSIVE DISORDER, RECURRENT EPISODE, MILD: ICD-10-CM

## 2025-09-04 RX ORDER — DESVENLAFAXINE 25 MG/1
25 TABLET, EXTENDED RELEASE ORAL DAILY
Qty: 90 TABLET | Refills: 0 | Status: SHIPPED | OUTPATIENT
Start: 2025-09-04

## 2025-09-04 RX ORDER — DESVENLAFAXINE 50 MG/1
50 TABLET, EXTENDED RELEASE ORAL DAILY
Qty: 90 TABLET | Refills: 0 | Status: SHIPPED | OUTPATIENT
Start: 2025-09-04

## (undated) RX ORDER — METOPROLOL TARTRATE 50 MG
TABLET ORAL
Status: DISPENSED
Start: 2025-07-30

## (undated) RX ORDER — IVABRADINE 5 MG/1
TABLET, FILM COATED ORAL
Status: DISPENSED
Start: 2025-07-30